# Patient Record
Sex: FEMALE | Race: WHITE | NOT HISPANIC OR LATINO | Employment: OTHER | URBAN - METROPOLITAN AREA
[De-identification: names, ages, dates, MRNs, and addresses within clinical notes are randomized per-mention and may not be internally consistent; named-entity substitution may affect disease eponyms.]

---

## 2023-02-25 PROBLEM — E66.01 MORBID OBESITY WITH BMI OF 40.0-44.9, ADULT (MULTI): Status: ACTIVE | Noted: 2023-02-25

## 2023-02-25 PROBLEM — E86.0 DEHYDRATION: Status: ACTIVE | Noted: 2023-02-25

## 2023-02-25 PROBLEM — H72.91 PERFORATION OF RIGHT TYMPANIC MEMBRANE: Status: ACTIVE | Noted: 2023-02-25

## 2023-02-25 PROBLEM — L30.9 ECZEMA: Status: ACTIVE | Noted: 2023-02-25

## 2023-02-25 PROBLEM — E78.2 MIXED HYPERLIPIDEMIA: Status: ACTIVE | Noted: 2023-02-25

## 2023-02-25 PROBLEM — R55 PRE-SYNCOPE: Status: ACTIVE | Noted: 2023-02-25

## 2023-02-25 PROBLEM — F41.8 DEPRESSION WITH ANXIETY: Status: ACTIVE | Noted: 2023-02-25

## 2023-02-25 PROBLEM — R42 POSTURAL DIZZINESS WITH PRESYNCOPE: Status: ACTIVE | Noted: 2023-02-25

## 2023-02-25 PROBLEM — J30.2 SEASONAL ALLERGIC RHINITIS: Status: ACTIVE | Noted: 2023-02-25

## 2023-02-25 PROBLEM — R09.82 POST-NASAL DRIP: Status: ACTIVE | Noted: 2023-02-25

## 2023-02-25 PROBLEM — R53.83 FATIGUE: Status: ACTIVE | Noted: 2023-02-25

## 2023-02-25 PROBLEM — R20.2 PARESTHESIAS: Status: ACTIVE | Noted: 2023-02-25

## 2023-02-25 PROBLEM — G47.33 OBSTRUCTIVE SLEEP APNEA SYNDROME IN ADULT: Status: ACTIVE | Noted: 2023-02-25

## 2023-02-25 PROBLEM — J01.01 RECURRENT MAXILLARY SINUSITIS: Status: ACTIVE | Noted: 2023-02-25

## 2023-02-25 PROBLEM — M54.2 NECK PAIN: Status: ACTIVE | Noted: 2023-02-25

## 2023-02-25 PROBLEM — E55.9 VITAMIN D DEFICIENCY: Status: ACTIVE | Noted: 2023-02-25

## 2023-02-25 PROBLEM — R51.9 UNILATERAL HEADACHE: Status: ACTIVE | Noted: 2023-02-25

## 2023-02-25 PROBLEM — R14.0 FLATULENCE/GAS PAIN/BELCHING: Status: ACTIVE | Noted: 2023-02-25

## 2023-02-25 PROBLEM — F33.1 MODERATE EPISODE OF RECURRENT MAJOR DEPRESSIVE DISORDER (MULTI): Status: ACTIVE | Noted: 2023-02-25

## 2023-02-25 PROBLEM — M48.02 DEGENERATIVE CERVICAL SPINAL STENOSIS: Status: ACTIVE | Noted: 2023-02-25

## 2023-02-25 PROBLEM — R73.03 PREDIABETES: Status: ACTIVE | Noted: 2023-02-25

## 2023-02-25 PROBLEM — K21.9 GERD (GASTROESOPHAGEAL REFLUX DISEASE): Status: ACTIVE | Noted: 2023-02-25

## 2023-02-25 PROBLEM — R23.3 PETECHIAL RASH: Status: ACTIVE | Noted: 2023-02-25

## 2023-02-25 PROBLEM — B37.31 YEAST VAGINITIS: Status: ACTIVE | Noted: 2023-02-25

## 2023-02-25 PROBLEM — K59.09 CHRONIC CONSTIPATION: Status: ACTIVE | Noted: 2023-02-25

## 2023-02-25 PROBLEM — M23.305 DERANGEMENT OF MEDIAL MENISCUS: Status: ACTIVE | Noted: 2023-02-25

## 2023-02-25 PROBLEM — R05.8 PAROXYSMAL COUGH: Status: ACTIVE | Noted: 2023-02-25

## 2023-02-25 PROBLEM — I10 ESSENTIAL HYPERTENSION: Status: ACTIVE | Noted: 2023-02-25

## 2023-02-25 PROBLEM — R19.8 ALTERNATING CONSTIPATION AND DIARRHEA: Status: ACTIVE | Noted: 2023-02-25

## 2023-02-25 PROBLEM — R53.81 DEBILITY: Status: ACTIVE | Noted: 2023-02-25

## 2023-02-25 PROBLEM — H10.13 ALLERGIC CONJUNCTIVITIS OF BOTH EYES: Status: ACTIVE | Noted: 2023-02-25

## 2023-02-25 PROBLEM — R79.89 AZOTEMIA: Status: ACTIVE | Noted: 2023-02-25

## 2023-02-25 PROBLEM — M25.50 POLYARTHRALGIA: Status: ACTIVE | Noted: 2023-02-25

## 2023-02-25 PROBLEM — H92.03 OTALGIA OF BOTH EARS: Status: ACTIVE | Noted: 2023-02-25

## 2023-02-25 PROBLEM — E79.0 HYPERURICEMIA: Status: ACTIVE | Noted: 2023-02-25

## 2023-02-25 PROBLEM — R55 POSTURAL DIZZINESS WITH PRESYNCOPE: Status: ACTIVE | Noted: 2023-02-25

## 2023-02-25 PROBLEM — M17.12 LEFT KNEE DJD: Status: ACTIVE | Noted: 2023-02-25

## 2023-02-25 RX ORDER — LISINOPRIL 10 MG/1
5 TABLET ORAL DAILY
COMMUNITY
Start: 2021-12-21 | End: 2023-07-31 | Stop reason: SDUPTHER

## 2023-02-25 RX ORDER — MINERAL OIL
ENEMA (ML) RECTAL
COMMUNITY
Start: 2022-08-17

## 2023-02-25 RX ORDER — VENLAFAXINE HYDROCHLORIDE 150 MG/1
150 CAPSULE, EXTENDED RELEASE ORAL
COMMUNITY
Start: 2021-12-28 | End: 2023-03-30 | Stop reason: SDUPTHER

## 2023-02-25 RX ORDER — OLOPATADINE HYDROCHLORIDE 1 MG/ML
SOLUTION/ DROPS OPHTHALMIC
COMMUNITY
Start: 2021-06-04

## 2023-02-25 RX ORDER — ONDANSETRON 4 MG/1
4 TABLET, ORALLY DISINTEGRATING ORAL EVERY 6 HOURS PRN
COMMUNITY
Start: 2021-07-07 | End: 2023-09-06 | Stop reason: SDUPTHER

## 2023-02-25 RX ORDER — ALBUTEROL SULFATE 90 UG/1
2 AEROSOL, METERED RESPIRATORY (INHALATION) EVERY 4 HOURS PRN
COMMUNITY
Start: 2022-11-15 | End: 2024-01-19 | Stop reason: SDUPTHER

## 2023-02-25 RX ORDER — FLUTICASONE PROPIONATE 50 MCG
1 SPRAY, SUSPENSION (ML) NASAL 2 TIMES DAILY
COMMUNITY
Start: 2021-06-04 | End: 2023-07-31 | Stop reason: SDUPTHER

## 2023-02-25 RX ORDER — GABAPENTIN 100 MG/1
CAPSULE ORAL
COMMUNITY
End: 2023-07-31 | Stop reason: SDUPTHER

## 2023-02-25 RX ORDER — DILTIAZEM HYDROCHLORIDE 120 MG/1
1 CAPSULE, EXTENDED RELEASE ORAL DAILY
COMMUNITY
Start: 2021-12-21 | End: 2023-03-30 | Stop reason: SDUPTHER

## 2023-03-30 ENCOUNTER — OFFICE VISIT (OUTPATIENT)
Dept: PRIMARY CARE | Facility: CLINIC | Age: 73
End: 2023-03-30
Payer: COMMERCIAL

## 2023-03-30 VITALS
WEIGHT: 227 LBS | HEIGHT: 62 IN | SYSTOLIC BLOOD PRESSURE: 120 MMHG | RESPIRATION RATE: 18 BRPM | DIASTOLIC BLOOD PRESSURE: 59 MMHG | BODY MASS INDEX: 41.77 KG/M2 | HEART RATE: 72 BPM | OXYGEN SATURATION: 97 %

## 2023-03-30 DIAGNOSIS — E66.01 MORBID OBESITY WITH BMI OF 40.0-44.9, ADULT (MULTI): ICD-10-CM

## 2023-03-30 DIAGNOSIS — F41.8 DEPRESSION WITH ANXIETY: ICD-10-CM

## 2023-03-30 DIAGNOSIS — N39.41 URGE INCONTINENCE: ICD-10-CM

## 2023-03-30 DIAGNOSIS — R73.03 PREDIABETES: ICD-10-CM

## 2023-03-30 DIAGNOSIS — E78.2 MIXED HYPERLIPIDEMIA: ICD-10-CM

## 2023-03-30 DIAGNOSIS — Z78.9 STATIN INTOLERANCE: ICD-10-CM

## 2023-03-30 DIAGNOSIS — Z00.00 WELCOME TO MEDICARE PREVENTIVE VISIT: ICD-10-CM

## 2023-03-30 DIAGNOSIS — Z00.00 ROUTINE GENERAL MEDICAL EXAMINATION AT HEALTH CARE FACILITY: Primary | ICD-10-CM

## 2023-03-30 DIAGNOSIS — G47.33 OBSTRUCTIVE SLEEP APNEA SYNDROME IN ADULT: ICD-10-CM

## 2023-03-30 DIAGNOSIS — I10 ESSENTIAL HYPERTENSION: ICD-10-CM

## 2023-03-30 DIAGNOSIS — R19.8 ALTERNATING CONSTIPATION AND DIARRHEA: ICD-10-CM

## 2023-03-30 DIAGNOSIS — Z12.31 SCREENING MAMMOGRAM, ENCOUNTER FOR: ICD-10-CM

## 2023-03-30 PROBLEM — E86.0 DEHYDRATION: Status: RESOLVED | Noted: 2023-02-25 | Resolved: 2023-03-30

## 2023-03-30 PROCEDURE — 3074F SYST BP LT 130 MM HG: CPT | Performed by: INTERNAL MEDICINE

## 2023-03-30 PROCEDURE — 3078F DIAST BP <80 MM HG: CPT | Performed by: INTERNAL MEDICINE

## 2023-03-30 PROCEDURE — 1159F MED LIST DOCD IN RCRD: CPT | Performed by: INTERNAL MEDICINE

## 2023-03-30 PROCEDURE — 99214 OFFICE O/P EST MOD 30 MIN: CPT | Performed by: INTERNAL MEDICINE

## 2023-03-30 PROCEDURE — 1036F TOBACCO NON-USER: CPT | Performed by: INTERNAL MEDICINE

## 2023-03-30 PROCEDURE — 1170F FXNL STATUS ASSESSED: CPT | Performed by: INTERNAL MEDICINE

## 2023-03-30 PROCEDURE — 1123F ACP DISCUSS/DSCN MKR DOCD: CPT | Performed by: INTERNAL MEDICINE

## 2023-03-30 PROCEDURE — 3008F BODY MASS INDEX DOCD: CPT | Performed by: INTERNAL MEDICINE

## 2023-03-30 PROCEDURE — 1160F RVW MEDS BY RX/DR IN RCRD: CPT | Performed by: INTERNAL MEDICINE

## 2023-03-30 RX ORDER — LANOLIN ALCOHOL/MO/W.PET/CERES
1000 CREAM (GRAM) TOPICAL
COMMUNITY
Start: 2019-01-17

## 2023-03-30 RX ORDER — VENLAFAXINE HYDROCHLORIDE 150 MG/1
150 CAPSULE, EXTENDED RELEASE ORAL DAILY
Qty: 90 CAPSULE | Refills: 0 | Status: SHIPPED | OUTPATIENT
Start: 2023-03-30 | End: 2023-06-28

## 2023-03-30 RX ORDER — DILTIAZEM HYDROCHLORIDE 120 MG/1
120 CAPSULE, EXTENDED RELEASE ORAL DAILY
Qty: 90 CAPSULE | Refills: 1 | Status: SHIPPED | OUTPATIENT
Start: 2023-03-30 | End: 2023-07-31 | Stop reason: SDUPTHER

## 2023-03-30 ASSESSMENT — ENCOUNTER SYMPTOMS
OCCASIONAL FEELINGS OF UNSTEADINESS: 0
DEPRESSION: 0
LOSS OF SENSATION IN FEET: 0

## 2023-03-30 ASSESSMENT — ACTIVITIES OF DAILY LIVING (ADL)
MANAGING_FINANCES: INDEPENDENT
BATHING: INDEPENDENT
GROCERY_SHOPPING: INDEPENDENT
TAKING_MEDICATION: INDEPENDENT
DOING_HOUSEWORK: INDEPENDENT
DRESSING: INDEPENDENT

## 2023-03-30 ASSESSMENT — PATIENT HEALTH QUESTIONNAIRE - PHQ9
1. LITTLE INTEREST OR PLEASURE IN DOING THINGS: NOT AT ALL
2. FEELING DOWN, DEPRESSED OR HOPELESS: NOT AT ALL
SUM OF ALL RESPONSES TO PHQ9 QUESTIONS 1 AND 2: 0

## 2023-03-30 NOTE — PATIENT INSTRUCTIONS
Thank you very much for coming.  I am very happy to see you.    Congratulations on your skilled nursing.  It is probably the best thing that you did in a long time!  In the meantime, we did your Medicare Welcome visit, and there are things that you can do first, and if you would like, you can take the next 4 months to work on yourself.    I do understand that your psychiatrist cannot see you anymore.  Let us get you a new psychiatrist.  In the meantime, you are doing so well, that I will give you your VENLAFAXINE for 3 months, then I want you to call for a refill, so that you can tell me how you are doing before I give you another 3 months.    I will see you in 4 months so that we can see how you are doing with diet and exercise and taking care of yourself.  FASTING laboratory examinations, then see me for your postponed COMPLETE physical examination.    Until then, you are already doing very well.  The stress of work it is off your shoulders.  With your diarrhea, we will not do regular metformin.  We will postpone that we will defer that medication for the future if ever.    You already realized that you have all this time to take care of yourself, and improve motivation.  Go ahead and join [employer].  I am glad you are even taking a painting.  Maximize diet.  Maximize exercise.  [place] diet book, DASH diet for ideas.    Make sure you are getting enough rest and sleep.    Have your MAMMOGRAM done sometime soon.    Please get in touch with GI and reschedule your COLONOSCOPY, very important.    Please urinate often, and the rule of thumb is to urinate before you have to urinate!  That way, you will not have to get into accidents.    Again, come back in 4 months after maximizing diet, exercise, and taking care of yourself.  Call me sooner with any questions or concerns.  Call me in 3 months for refill of VENLAFAXINE, and that you are seeing a new psychiatrist.    FASTING laboratory examinations in 4 months, then  see me for your COMPLETE physical examination.  Until then, please continue to take care of yourself and your family, and please continue to pray for our recovery from this pandemic.  I hope you have a blessed Lent and a happy Easter!            0  Return in 4 months.  40 minutes please.  Repeat FASTING laboratory examination, then see me for COMPLETE physical examination.  Reassess mood, energy, function.  Coordinate hopefully with psychiatry.  Consider offering statin once more.  Reassess possible CPAP use.  Reassess options regarding weight management.  Coordinate with GI, colonoscopy results.  Review preventive strategies, weight management, cardiovascular risk.            0

## 2023-03-30 NOTE — PROGRESS NOTES
Subjective   Patient ID: Cindy Calvillo is a 72 y.o. female who presents for Follow-up (Patient is here for follow up.).    HPI     Review of Systems    Objective   There were no vitals taken for this visit.    Physical Exam    Assessment/Plan

## 2023-03-30 NOTE — PROGRESS NOTES
"Subjective   Reason for Visit: Cindy Calvillo is an 72 y.o. female here for a Medicare Wellness visit.          Reviewed all medications by prescribing practitioner or clinical pharmacist (such as prescriptions, OTCs, herbal therapies and supplements) and documented in the medical record.    HPI    Patient Care Team:  Chilo Graves MD as PCP - General  Chilo Graves MD as PCP - [facility] [employer] PCP     Review of Systems    Objective   Vitals:  /59 (BP Location: Left arm, Patient Position: Sitting, BP Cuff Size: Adult)   Pulse 72   Resp 18   Ht 1.575 m (5' 2\")   Wt 103 kg (227 lb)   SpO2 97%   BMI 41.52 kg/m²       Physical Exam    Assessment/Plan   Problem List Items Addressed This Visit    None  Visit Diagnoses     Routine general medical examination at health care facility    -  Primary               "

## 2023-04-19 ENCOUNTER — TELEPHONE (OUTPATIENT)
Dept: PRIMARY CARE | Facility: CLINIC | Age: 73
End: 2023-04-19
Payer: COMMERCIAL

## 2023-06-28 DIAGNOSIS — F41.8 DEPRESSION WITH ANXIETY: ICD-10-CM

## 2023-06-28 RX ORDER — VENLAFAXINE HYDROCHLORIDE 150 MG/1
150 CAPSULE, EXTENDED RELEASE ORAL DAILY
Qty: 90 CAPSULE | Refills: 0 | Status: SHIPPED | OUTPATIENT
Start: 2023-06-28 | End: 2023-11-16 | Stop reason: SDUPTHER

## 2023-07-26 ENCOUNTER — LAB (OUTPATIENT)
Dept: LAB | Facility: LAB | Age: 73
End: 2023-07-26
Payer: COMMERCIAL

## 2023-07-26 DIAGNOSIS — E78.2 MIXED HYPERLIPIDEMIA: ICD-10-CM

## 2023-07-26 DIAGNOSIS — R19.8 ALTERNATING CONSTIPATION AND DIARRHEA: ICD-10-CM

## 2023-07-26 DIAGNOSIS — R73.03 PREDIABETES: ICD-10-CM

## 2023-07-26 DIAGNOSIS — I10 ESSENTIAL HYPERTENSION: ICD-10-CM

## 2023-07-26 DIAGNOSIS — G47.33 OBSTRUCTIVE SLEEP APNEA SYNDROME IN ADULT: ICD-10-CM

## 2023-07-26 DIAGNOSIS — F41.8 DEPRESSION WITH ANXIETY: ICD-10-CM

## 2023-07-26 LAB
ALANINE AMINOTRANSFERASE (SGPT) (U/L) IN SER/PLAS: 20 U/L (ref 7–45)
ALBUMIN (G/DL) IN SER/PLAS: 4 G/DL (ref 3.4–5)
ALKALINE PHOSPHATASE (U/L) IN SER/PLAS: 59 U/L (ref 33–136)
ANION GAP IN SER/PLAS: 16 MMOL/L (ref 10–20)
ASPARTATE AMINOTRANSFERASE (SGOT) (U/L) IN SER/PLAS: 16 U/L (ref 9–39)
BASOPHILS (10*3/UL) IN BLOOD BY AUTOMATED COUNT: 0.06 X10E9/L (ref 0–0.1)
BASOPHILS/100 LEUKOCYTES IN BLOOD BY AUTOMATED COUNT: 0.8 % (ref 0–2)
BILIRUBIN TOTAL (MG/DL) IN SER/PLAS: 1.1 MG/DL (ref 0–1.2)
CALCIUM (MG/DL) IN SER/PLAS: 9.5 MG/DL (ref 8.6–10.3)
CARBON DIOXIDE, TOTAL (MMOL/L) IN SER/PLAS: 26 MMOL/L (ref 21–32)
CHLORIDE (MMOL/L) IN SER/PLAS: 102 MMOL/L (ref 98–107)
CHOLESTEROL (MG/DL) IN SER/PLAS: 248 MG/DL (ref 0–199)
CHOLESTEROL IN HDL (MG/DL) IN SER/PLAS: 37.8 MG/DL
CHOLESTEROL/HDL RATIO: 6.6
CREATININE (MG/DL) IN SER/PLAS: 0.79 MG/DL (ref 0.5–1.05)
EOSINOPHILS (10*3/UL) IN BLOOD BY AUTOMATED COUNT: 0.13 X10E9/L (ref 0–0.4)
EOSINOPHILS/100 LEUKOCYTES IN BLOOD BY AUTOMATED COUNT: 1.8 % (ref 0–6)
ERYTHROCYTE DISTRIBUTION WIDTH (RATIO) BY AUTOMATED COUNT: 13.2 % (ref 11.5–14.5)
ERYTHROCYTE MEAN CORPUSCULAR HEMOGLOBIN CONCENTRATION (G/DL) BY AUTOMATED: 32.6 G/DL (ref 32–36)
ERYTHROCYTE MEAN CORPUSCULAR VOLUME (FL) BY AUTOMATED COUNT: 92 FL (ref 80–100)
ERYTHROCYTES (10*6/UL) IN BLOOD BY AUTOMATED COUNT: 4.67 X10E12/L (ref 4–5.2)
ESTIMATED AVERAGE GLUCOSE FOR HBA1C: 128 MG/DL
GFR FEMALE: 80 ML/MIN/1.73M2
GLUCOSE (MG/DL) IN SER/PLAS: 160 MG/DL (ref 74–99)
HEMATOCRIT (%) IN BLOOD BY AUTOMATED COUNT: 43 % (ref 36–46)
HEMOGLOBIN (G/DL) IN BLOOD: 14 G/DL (ref 12–16)
HEMOGLOBIN A1C/HEMOGLOBIN TOTAL IN BLOOD: 6.1 %
IMMATURE GRANULOCYTES/100 LEUKOCYTES IN BLOOD BY AUTOMATED COUNT: 0.3 % (ref 0–0.9)
LDL: 172 MG/DL (ref 0–99)
LEUKOCYTES (10*3/UL) IN BLOOD BY AUTOMATED COUNT: 7.1 X10E9/L (ref 4.4–11.3)
LYMPHOCYTES (10*3/UL) IN BLOOD BY AUTOMATED COUNT: 1.83 X10E9/L (ref 0.8–3)
LYMPHOCYTES/100 LEUKOCYTES IN BLOOD BY AUTOMATED COUNT: 25.7 % (ref 13–44)
MAGNESIUM (MG/DL) IN SER/PLAS: 1.99 MG/DL (ref 1.6–2.4)
MONOCYTES (10*3/UL) IN BLOOD BY AUTOMATED COUNT: 0.28 X10E9/L (ref 0.05–0.8)
MONOCYTES/100 LEUKOCYTES IN BLOOD BY AUTOMATED COUNT: 3.9 % (ref 2–10)
NEUTROPHILS (10*3/UL) IN BLOOD BY AUTOMATED COUNT: 4.79 X10E9/L (ref 1.6–5.5)
NEUTROPHILS/100 LEUKOCYTES IN BLOOD BY AUTOMATED COUNT: 67.5 % (ref 40–80)
PLATELETS (10*3/UL) IN BLOOD AUTOMATED COUNT: 226 X10E9/L (ref 150–450)
POTASSIUM (MMOL/L) IN SER/PLAS: 4.3 MMOL/L (ref 3.5–5.3)
PROTEIN TOTAL: 7.1 G/DL (ref 6.4–8.2)
SODIUM (MMOL/L) IN SER/PLAS: 140 MMOL/L (ref 136–145)
THYROTROPIN (MIU/L) IN SER/PLAS BY DETECTION LIMIT <= 0.05 MIU/L: 2.57 MIU/L (ref 0.44–3.98)
TRIGLYCERIDE (MG/DL) IN SER/PLAS: 193 MG/DL (ref 0–149)
UREA NITROGEN (MG/DL) IN SER/PLAS: 14 MG/DL (ref 6–23)
VLDL: 39 MG/DL (ref 0–40)

## 2023-07-26 PROCEDURE — 80061 LIPID PANEL: CPT

## 2023-07-26 PROCEDURE — 36415 COLL VENOUS BLD VENIPUNCTURE: CPT

## 2023-07-26 PROCEDURE — 83036 HEMOGLOBIN GLYCOSYLATED A1C: CPT

## 2023-07-26 PROCEDURE — 83735 ASSAY OF MAGNESIUM: CPT

## 2023-07-26 PROCEDURE — 85025 COMPLETE CBC W/AUTO DIFF WBC: CPT

## 2023-07-26 PROCEDURE — 80053 COMPREHEN METABOLIC PANEL: CPT

## 2023-07-26 PROCEDURE — 84443 ASSAY THYROID STIM HORMONE: CPT

## 2023-07-31 ENCOUNTER — TELEPHONE (OUTPATIENT)
Dept: PRIMARY CARE | Facility: CLINIC | Age: 73
End: 2023-07-31

## 2023-07-31 ENCOUNTER — OFFICE VISIT (OUTPATIENT)
Dept: PRIMARY CARE | Facility: CLINIC | Age: 73
End: 2023-07-31
Payer: COMMERCIAL

## 2023-07-31 VITALS
HEART RATE: 76 BPM | HEIGHT: 62 IN | BODY MASS INDEX: 41.59 KG/M2 | RESPIRATION RATE: 20 BRPM | DIASTOLIC BLOOD PRESSURE: 74 MMHG | SYSTOLIC BLOOD PRESSURE: 117 MMHG | WEIGHT: 226 LBS | OXYGEN SATURATION: 98 %

## 2023-07-31 DIAGNOSIS — I10 ESSENTIAL HYPERTENSION: Primary | ICD-10-CM

## 2023-07-31 DIAGNOSIS — E66.01 MORBID OBESITY WITH BMI OF 40.0-44.9, ADULT (MULTI): ICD-10-CM

## 2023-07-31 DIAGNOSIS — Z78.9 STATIN INTOLERANCE: ICD-10-CM

## 2023-07-31 DIAGNOSIS — R73.03 PREDIABETES: ICD-10-CM

## 2023-07-31 DIAGNOSIS — E55.9 VITAMIN D DEFICIENCY: ICD-10-CM

## 2023-07-31 DIAGNOSIS — E78.2 MIXED HYPERLIPIDEMIA: ICD-10-CM

## 2023-07-31 DIAGNOSIS — G47.33 OBSTRUCTIVE SLEEP APNEA SYNDROME IN ADULT: ICD-10-CM

## 2023-07-31 DIAGNOSIS — F41.8 DEPRESSION WITH ANXIETY: ICD-10-CM

## 2023-07-31 DIAGNOSIS — N39.41 URGE INCONTINENCE: ICD-10-CM

## 2023-07-31 DIAGNOSIS — M48.02 DEGENERATIVE CERVICAL SPINAL STENOSIS: ICD-10-CM

## 2023-07-31 DIAGNOSIS — J30.1 SEASONAL ALLERGIC RHINITIS DUE TO POLLEN: ICD-10-CM

## 2023-07-31 DIAGNOSIS — Z11.59 ENCOUNTER FOR HEPATITIS C SCREENING TEST FOR LOW RISK PATIENT: ICD-10-CM

## 2023-07-31 DIAGNOSIS — R19.8 ALTERNATING CONSTIPATION AND DIARRHEA: ICD-10-CM

## 2023-07-31 DIAGNOSIS — M81.0 AGE-RELATED OSTEOPOROSIS WITHOUT CURRENT PATHOLOGICAL FRACTURE: ICD-10-CM

## 2023-07-31 DIAGNOSIS — Z13.820 SCREENING FOR OSTEOPOROSIS: ICD-10-CM

## 2023-07-31 DIAGNOSIS — Z12.31 SCREENING MAMMOGRAM, ENCOUNTER FOR: ICD-10-CM

## 2023-07-31 PROCEDURE — 1125F AMNT PAIN NOTED PAIN PRSNT: CPT | Performed by: INTERNAL MEDICINE

## 2023-07-31 PROCEDURE — 3074F SYST BP LT 130 MM HG: CPT | Performed by: INTERNAL MEDICINE

## 2023-07-31 PROCEDURE — 86803 HEPATITIS C AB TEST: CPT

## 2023-07-31 PROCEDURE — 1159F MED LIST DOCD IN RCRD: CPT | Performed by: INTERNAL MEDICINE

## 2023-07-31 PROCEDURE — 1036F TOBACCO NON-USER: CPT | Performed by: INTERNAL MEDICINE

## 2023-07-31 PROCEDURE — 99214 OFFICE O/P EST MOD 30 MIN: CPT | Performed by: INTERNAL MEDICINE

## 2023-07-31 PROCEDURE — 82150 ASSAY OF AMYLASE: CPT

## 2023-07-31 PROCEDURE — 3078F DIAST BP <80 MM HG: CPT | Performed by: INTERNAL MEDICINE

## 2023-07-31 PROCEDURE — 83690 ASSAY OF LIPASE: CPT

## 2023-07-31 PROCEDURE — 1160F RVW MEDS BY RX/DR IN RCRD: CPT | Performed by: INTERNAL MEDICINE

## 2023-07-31 PROCEDURE — 3008F BODY MASS INDEX DOCD: CPT | Performed by: INTERNAL MEDICINE

## 2023-07-31 RX ORDER — FLUTICASONE PROPIONATE 50 MCG
SPRAY, SUSPENSION (ML) NASAL
Qty: 16 G | Refills: 3 | Status: SHIPPED | OUTPATIENT
Start: 2023-07-31 | End: 2023-08-29

## 2023-07-31 RX ORDER — LISINOPRIL 5 MG/1
TABLET ORAL
Qty: 90 TABLET | Refills: 1 | Status: SHIPPED | OUTPATIENT
Start: 2023-07-31 | End: 2024-01-19 | Stop reason: SDUPTHER

## 2023-07-31 RX ORDER — GABAPENTIN 100 MG/1
CAPSULE ORAL
Qty: 360 CAPSULE | Refills: 0 | Status: SHIPPED | OUTPATIENT
Start: 2023-07-31 | End: 2023-12-12

## 2023-07-31 RX ORDER — DILTIAZEM HYDROCHLORIDE 120 MG/1
120 CAPSULE, EXTENDED RELEASE ORAL DAILY
Qty: 90 CAPSULE | Refills: 3 | Status: SHIPPED | OUTPATIENT
Start: 2023-07-31 | End: 2024-01-19 | Stop reason: SDUPTHER

## 2023-07-31 NOTE — PROGRESS NOTES
"Patient is here for CPE. Patient is cleared by CARDIOLOGY, EKG in January 2023    Subjective   Patient ID: Cindy Calvillo is a 73 y.o. female who presents for Annual Exam.    HPI   The patient is here for COMPLETE physical examination for the year.  She has no particular complaints, except for chronic issues, URGENCY of micturition, recurrent DIARRHEA.  No new medications.  The patient has been more compliant with diet, and has been trying to avoid CARBOHYDRATES.  Likewise, she has been more physically active.  She has had more time to take care of herself, and has been doing a lot of walking.  This is sometimes interrupted by urinary urgency, and she was thinking of getting a bicycle instead of walking, so that if needed, she can ride to the restroom faster.    Compliant with medications, tolerating regimens.  Told by her insurance company that she would benefit from getting a blood pressure cuff, and that she was \"hypertensive.\"  In the meantime, seen by cardiology January, cleared for possible surgery.  Has not had any particular symptoms referable to hemodynamic instability since then.  No lee chest pain.  No orthopnea, no paroxysmal nocturnal dyspnea.  No dizziness, diaphoresis, palpitations.  No loss of consciousness.  No bipedal edema.  No remarkable dyspnea on exertion.  Has not been able to tolerate STATINS in the past, with some myalgia, but otherwise, no focal weakness.  No skin changes, rashes, pruritus, jaundice.  No easy bruisability.    Has been in good spirits.  Has been enjoying FDC.  Continues to want to stay healthy, independent, and productive, and does not wish harm to self or others.  No headache, blurred vision, diplopia.  No dysphagia.  No focal weakness, no falls.  History of ataxia, has been better.  Not worried about memory.    ENDOCRINE with no polyuria, polydipsia, polyphagia.  No blurred vision.  No skin, hair, nail changes.  No dramatic weight loss or weight gain.  Careful " "about exposure.  No particular coughing, no particular sputum production.  CONSTITUTIONALLY, no fever, no chills.  No night sweats.  No lingering anorexia or nausea.  No apparent lymphadenopathy.  No apparent weight loss.        Review of Systems  Review of systems as in history of present illness, and otherwise, reviewed separately as well, and was unremarkable/negative/noncontributory.          Objective   /74 (BP Location: Left arm, Patient Position: Sitting, BP Cuff Size: Adult)   Pulse 76   Resp 20   Ht 1.575 m (5' 2\")   Wt 103 kg (226 lb)   SpO2 98%   BMI 41.34 kg/m²     Physical Exam  In very good spirits.  Not in distress or diaphoresis.  Alert, oriented x3.  Amiable.  Not unkempt.  Receptive.  Cheerful.  Appropriate.  Eager to stay independent, and improve quality of life.  Does not wish harm to self or others.  Obese, but capable and independent.    HEAD Pink palpebral conjunctivae, anicteric sclerae.  No sinus tenderness.  No tragal tenderness.  Tympanic membranes intact bilaterally.  Mucous membranes moist.  No tonsillopharyngeal congestion.  No thrush.  NECK supple, with no jugular venous distention, no lymph nodes.  No masses.  No bruits.  CARDIOVASCULAR adynamic precordium, with no heaves, thrills, or murmurs.  Regular rate and rhythm.  No bilateral edema.  LUNGS not in distress or diaphoresis.  Not using accessory muscles.  Clear to auscultation bilaterally.  BREASTS with no masses, discharge, and no lymphadenopathy noted, including axillary tail.  ABDOMEN positive bowel sounds, soft, nontender.  Liver and spleen not palpable.  Traube's space not obliterated.  No masses appreciated.  No herniation appreciated.  BACK no costovertebral angle tenderness.  EXTREMITIES no clubbing, no cyanosis.  SKIN with no breakdown, bleeding, jaundice.  NEURO no cranial nerve deficits noted.  No facial asymmetry.  Romberg negative.  No tremors.  Babinski negative.  No clonus.  PSYCH receptive, appropriate.  " Eager to stay healthy and independent and productive.  Continues to want to maintain and improve quality of life.      LABORATORY examinations reviewed with patient.        Assessment/Plan   Problem List Items Addressed This Visit       Alternating constipation and diarrhea    Relevant Orders    Amylase    Lipase    Urinalysis with Reflex Microscopic and Culture    Degenerative cervical spinal stenosis    Relevant Medications    gabapentin (Neurontin) 100 mg capsule    Depression with anxiety    Essential hypertension - Primary    Relevant Medications    dilTIAZem ER (Tiazac) 120 mg 24 hr capsule    lisinopril 5 mg tablet    Mixed hyperlipidemia    Morbid obesity with BMI of 40.0-44.9, adult (CMS/[facility])    Relevant Orders    XR DEXA bone density    Obstructive sleep apnea syndrome in adult    Prediabetes    Seasonal allergic rhinitis    Relevant Medications    fluticasone (Flonase) 50 mcg/actuation nasal spray    Statin intolerance    Urge incontinence    Relevant Orders    Urinalysis with Reflex Microscopic and Culture    XR DEXA bone density    Vitamin D deficiency    Relevant Orders    XR DEXA bone density     Other Visit Diagnoses       Screening mammogram, encounter for        Relevant Orders    BI mammo bilateral screening tomosynthesis    Screening for osteoporosis        Relevant Orders    XR DEXA bone density    Encounter for hepatitis C screening test for low risk patient        Relevant Orders    Hepatitis C antibody    Age-related osteoporosis without current pathological fracture        Relevant Orders    XR DEXA bone density             Thank you very much for coming.  I am very happy to see you.    Let us do some additional BLOOD and URINE examinations today.  I will send word regarding results and possible changes.    Please get your BONE DENSITY and MAMMOGRAM done sometime soon.    Please call GI back to reschedule for your COLONOSCOPY.  Very important.    You will benefit from a relatively new  PNEUMONIA vaccination, PREVNAR 20.  You have not had this 1.    I will have [facility] called up so that we can update your SHINGLES vaccination records.    Let me call you with results and possible changes.    I do understand that you have been maximizing DIET and EXERCISE.  [place] diet book, DASH diet for ideas.  Consider seeing a NUTRITIONIST.  Continue staying physically active.  Getting a bicycle is a good idea.    Depending on how things go, you will benefit from a trial of STATIN again anytime soon.  Let us first take care of other things, above.    Also, we can discuss options regarding weight management, especially with the new medications out there.  If your insurance will allow, injectable medications might be a good idea for you.    Please come back in 2 months, so that we can review all of your overall risks, and we will discuss options regarding weight management, and discuss options regarding controlling your cholesterol and blood sugar aside from diet and exercise on your own.  It will all depend on how you feel when you come back.    Until then, please continue to take care of yourself and your family, and please continue to pray for our recovery from this pandemic.  See you in 2 months.            0  Return in 2 months.  20 minutes please.  Reassess overall health, preventive strategies.  Consider treatment for prediabetes, morbid obesity.  Consider challenging again with statin.  Review overall cardiovascular risk.  Prepare for winter.            0

## 2023-07-31 NOTE — PATIENT INSTRUCTIONS
Thank you very much for coming.  I am very happy to see you.    Let us do some additional BLOOD and URINE examinations today.  I will send word regarding results and possible changes.    Please get your BONE DENSITY and MAMMOGRAM done sometime soon.    Please call GI back to reschedule for your COLONOSCOPY.  Very important.    You will benefit from a relatively new PNEUMONIA vaccination, PREVNAR 20.  You have not had this 1.    I will have [facility] called up so that we can update your SHINGLES vaccination records.    Let me call you with results and possible changes.    I do understand that you have been maximizing DIET and EXERCISE.  [place] diet book, DASH diet for ideas.  Consider seeing a NUTRITIONIST.  Continue staying physically active.  Getting a bicycle is a good idea.    Depending on how things go, you will benefit from a trial of STATIN again anytime soon.  Let us first take care of other things, above.    Also, we can discuss options regarding weight management, especially with the new medications out there.  If your insurance will allow, injectable medications might be a good idea for you.    Please come back in 2 months, so that we can review all of your overall risks, and we will discuss options regarding weight management, and discuss options regarding controlling your cholesterol and blood sugar aside from diet and exercise on your own.  It will all depend on how you feel when you come back.    Until then, please continue to take care of yourself and your family, and please continue to pray for our recovery from this pandemic.  See you in 2 months.            0  Return in 2 months.  20 minutes please.  Reassess overall health, preventive strategies.  Consider treatment for prediabetes, morbid obesity.  Consider challenging again with statin.  Review overall cardiovascular risk.  Prepare for winter.            0

## 2023-08-01 LAB
AMYLASE (U/L) IN SER/PLAS: 28 U/L (ref 29–103)
HEPATITIS C VIRUS AB PRESENCE IN SERUM: NONREACTIVE
LIPASE (U/L) IN SER/PLAS: 46 U/L (ref 9–82)

## 2023-08-29 DIAGNOSIS — J30.1 SEASONAL ALLERGIC RHINITIS DUE TO POLLEN: ICD-10-CM

## 2023-08-29 RX ORDER — FLUTICASONE PROPIONATE 50 MCG
SPRAY, SUSPENSION (ML) NASAL
Qty: 16 ML | Refills: 3 | Status: SHIPPED | OUTPATIENT
Start: 2023-08-29 | End: 2024-01-19 | Stop reason: SDUPTHER

## 2023-09-06 ENCOUNTER — OFFICE VISIT (OUTPATIENT)
Dept: PRIMARY CARE | Facility: CLINIC | Age: 73
End: 2023-09-06
Payer: COMMERCIAL

## 2023-09-06 VITALS
BODY MASS INDEX: 41.73 KG/M2 | OXYGEN SATURATION: 97 % | HEIGHT: 62 IN | DIASTOLIC BLOOD PRESSURE: 77 MMHG | SYSTOLIC BLOOD PRESSURE: 116 MMHG | HEART RATE: 82 BPM | RESPIRATION RATE: 18 BRPM | WEIGHT: 226.8 LBS

## 2023-09-06 DIAGNOSIS — R53.83 FATIGUE, UNSPECIFIED TYPE: Primary | ICD-10-CM

## 2023-09-06 DIAGNOSIS — I10 ESSENTIAL HYPERTENSION: ICD-10-CM

## 2023-09-06 DIAGNOSIS — E78.2 MIXED HYPERLIPIDEMIA: ICD-10-CM

## 2023-09-06 DIAGNOSIS — K21.9 GASTROESOPHAGEAL REFLUX DISEASE, UNSPECIFIED WHETHER ESOPHAGITIS PRESENT: ICD-10-CM

## 2023-09-06 DIAGNOSIS — R73.03 PREDIABETES: ICD-10-CM

## 2023-09-06 DIAGNOSIS — G47.33 OSA ON CPAP: ICD-10-CM

## 2023-09-06 DIAGNOSIS — F33.1 MODERATE EPISODE OF RECURRENT MAJOR DEPRESSIVE DISORDER (MULTI): ICD-10-CM

## 2023-09-06 DIAGNOSIS — E55.9 VITAMIN D DEFICIENCY: ICD-10-CM

## 2023-09-06 LAB
BASOPHILS (10*3/UL) IN BLOOD BY AUTOMATED COUNT: 0.06 X10E9/L (ref 0–0.1)
BASOPHILS/100 LEUKOCYTES IN BLOOD BY AUTOMATED COUNT: 0.8 % (ref 0–2)
EOSINOPHILS (10*3/UL) IN BLOOD BY AUTOMATED COUNT: 0.13 X10E9/L (ref 0–0.4)
EOSINOPHILS/100 LEUKOCYTES IN BLOOD BY AUTOMATED COUNT: 1.7 % (ref 0–6)
ERYTHROCYTE DISTRIBUTION WIDTH (RATIO) BY AUTOMATED COUNT: 13.1 % (ref 11.5–14.5)
ERYTHROCYTE MEAN CORPUSCULAR HEMOGLOBIN CONCENTRATION (G/DL) BY AUTOMATED: 32.4 G/DL (ref 32–36)
ERYTHROCYTE MEAN CORPUSCULAR VOLUME (FL) BY AUTOMATED COUNT: 93 FL (ref 80–100)
ERYTHROCYTES (10*6/UL) IN BLOOD BY AUTOMATED COUNT: 4.44 X10E12/L (ref 4–5.2)
HEMATOCRIT (%) IN BLOOD BY AUTOMATED COUNT: 41.3 % (ref 36–46)
HEMOGLOBIN (G/DL) IN BLOOD: 13.4 G/DL (ref 12–16)
IMMATURE GRANULOCYTES/100 LEUKOCYTES IN BLOOD BY AUTOMATED COUNT: 0.3 % (ref 0–0.9)
LEUKOCYTES (10*3/UL) IN BLOOD BY AUTOMATED COUNT: 7.8 X10E9/L (ref 4.4–11.3)
LYMPHOCYTES (10*3/UL) IN BLOOD BY AUTOMATED COUNT: 1.52 X10E9/L (ref 0.8–3)
LYMPHOCYTES/100 LEUKOCYTES IN BLOOD BY AUTOMATED COUNT: 19.6 % (ref 13–44)
MONOCYTES (10*3/UL) IN BLOOD BY AUTOMATED COUNT: 0.34 X10E9/L (ref 0.05–0.8)
MONOCYTES/100 LEUKOCYTES IN BLOOD BY AUTOMATED COUNT: 4.4 % (ref 2–10)
NEUTROPHILS (10*3/UL) IN BLOOD BY AUTOMATED COUNT: 5.68 X10E9/L (ref 1.6–5.5)
NEUTROPHILS/100 LEUKOCYTES IN BLOOD BY AUTOMATED COUNT: 73.2 % (ref 40–80)
NRBC (PER 100 WBCS) BY AUTOMATED COUNT: 0 /100 WBC (ref 0–0)
PLATELETS (10*3/UL) IN BLOOD AUTOMATED COUNT: 227 X10E9/L (ref 150–450)

## 2023-09-06 PROCEDURE — 80053 COMPREHEN METABOLIC PANEL: CPT

## 2023-09-06 PROCEDURE — 99213 OFFICE O/P EST LOW 20 MIN: CPT | Performed by: INTERNAL MEDICINE

## 2023-09-06 PROCEDURE — 83735 ASSAY OF MAGNESIUM: CPT

## 2023-09-06 PROCEDURE — 1125F AMNT PAIN NOTED PAIN PRSNT: CPT | Performed by: INTERNAL MEDICINE

## 2023-09-06 PROCEDURE — 3078F DIAST BP <80 MM HG: CPT | Performed by: INTERNAL MEDICINE

## 2023-09-06 PROCEDURE — 84443 ASSAY THYROID STIM HORMONE: CPT

## 2023-09-06 PROCEDURE — 1159F MED LIST DOCD IN RCRD: CPT | Performed by: INTERNAL MEDICINE

## 2023-09-06 PROCEDURE — 1036F TOBACCO NON-USER: CPT | Performed by: INTERNAL MEDICINE

## 2023-09-06 PROCEDURE — 3008F BODY MASS INDEX DOCD: CPT | Performed by: INTERNAL MEDICINE

## 2023-09-06 PROCEDURE — 85025 COMPLETE CBC W/AUTO DIFF WBC: CPT

## 2023-09-06 PROCEDURE — 83036 HEMOGLOBIN GLYCOSYLATED A1C: CPT

## 2023-09-06 PROCEDURE — 1160F RVW MEDS BY RX/DR IN RCRD: CPT | Performed by: INTERNAL MEDICINE

## 2023-09-06 PROCEDURE — 3074F SYST BP LT 130 MM HG: CPT | Performed by: INTERNAL MEDICINE

## 2023-09-06 RX ORDER — ONDANSETRON 4 MG/1
TABLET, ORALLY DISINTEGRATING ORAL
Qty: 45 TABLET | Refills: 0 | Status: SHIPPED | OUTPATIENT
Start: 2023-09-06 | End: 2023-10-02

## 2023-09-06 RX ORDER — FAMOTIDINE 20 MG/1
TABLET, FILM COATED ORAL
Qty: 180 TABLET | Refills: 1 | Status: SHIPPED | OUTPATIENT
Start: 2023-09-06 | End: 2023-10-19 | Stop reason: SDUPTHER

## 2023-09-06 NOTE — PATIENT INSTRUCTIONS
Thank very much for coming.  I am very happy to see you.    Please keep your appointment with your ALLERGIST, very important.  Please keep your appointment with your THERAPIST as well.  Please try and see if you can work with your CPAP machine.  Please keep your appointment with me in a couple of weeks.  I will call you sooner with results and possible changes.    This is unlikely to be related to B12, and instead, likely related to obstructive sleep apnea, but we will check all your markers today.    Please continue to take care of yourself and your family, and please continue to pray for our recovery from this pandemic.  See you in a few weeks.            0  The patient already has an appointment in a few weeks, but I will call her sooner with results and possible changes.            0

## 2023-09-06 NOTE — PROGRESS NOTES
"Patient is here for follow up.Subjective   Patient ID: Cindy Calvillo is a 73 y.o. female who presents for Follow-up.    HPI   The patient is here a little earlier than expected.  States that she is tired of upper respiratory symptoms, mainly rhinorrhea, sometimes causing her to have bloating, even some reflux symptoms.  She is due to see an ALLERGIST soon, but is wondering if she could do better than just fexofenadine and fluticasone.  Some occasional coughing, no particular sputum production.  No particular shortness of breath.  CONSTITUTIONALLY, no fever, no chills.  No night sweats.  No lingering anorexia or nausea.  No apparent lymphadenopathy.  No apparent weight loss.    Again, states she has had some exacerbation of REFLUX symptoms, including NAUSEA.  Has had good response to ONDANSETRON in the past.  No overlying skin changes.  No dysuria, flank, suprapubic pain.  No change in bowel or bladder character or habits.    In the meantime, frustrated over daytime sleepiness, and although she has a CPAP apparatus, she states that she is having a hard time tolerating her mask.  No confusion or delirium.  No headache, blurred vision, diplopia.  No dysphagia.  No lee substernal chest pain.  No orthopnea, no paroxysmal nocturnal dyspnea.        Review of Systems  Review of systems as in history of present illness, and otherwise, reviewed separately as well, and was unremarkable/negative/noncontributory.          Objective   /77 (BP Location: Left arm, Patient Position: Sitting, BP Cuff Size: Adult)   Pulse 82   Resp 18   Ht 1.575 m (5' 2\")   Wt 103 kg (226 lb 12.8 oz)   SpO2 97%   BMI 41.48 kg/m²     Physical Exam  Frustrated, but otherwise, not in distress or diaphoresis.  Receptive, oriented x3.  Amiable.  Not unkempt.  Obese build, but capable of caring for self.  Wants to improve quality of life.  Does not wish harm to self or others.    HEAD pink palpebral conjunctivae, anicteric sclerae.  NECK " supple, no apparent jugular venous distention.  CARDIOVASCULAR not in distress or diaphoresis.  No bipedal edema.  LUNGS not in distress or diaphoresis.  Not using accessory muscles.  ABDOMEN soft, nontender.  BACK no costovertebral angle tenderness.  EXTREMITIES no clubbing, no cyanosis.  NEURO no facial asymmetry.  No apparent cranial nerve deficits.  Ambulating without need of assistance.  No apparent focal weakness.  No tremors.  PSYCH receptive, appropriate, and eager to maintain and improve quality of life.      LABORATORY examinations reviewed with patient.  Due for update, to be done today.        Assessment/Plan   Problem List Items Addressed This Visit       Essential hypertension    Relevant Orders    Urinalysis with Reflex Microscopic and Culture    Magnesium    TSH with reflex to Free T4 if abnormal    Fatigue - Primary    Relevant Orders    CBC and Auto Differential    Urinalysis with Reflex Microscopic and Culture    Vitamin D 25-Hydroxy,Total (for eval of Vitamin D levels)    Comprehensive Metabolic Panel    Hemoglobin A1C    Magnesium    TSH with reflex to Free T4 if abnormal    Vitamin B12    Folate    Mixed hyperlipidemia    Relevant Orders    Comprehensive Metabolic Panel    Moderate episode of recurrent major depressive disorder (CMS/HCC)    Relevant Orders    TSH with reflex to Free T4 if abnormal    [place] on CPAP    Relevant Orders    CBC and Auto Differential    TSH with reflex to Free T4 if abnormal    Prediabetes    Relevant Orders    Urinalysis with Reflex Microscopic and Culture    Hemoglobin A1C    Vitamin D deficiency    Relevant Orders    Vitamin D 25-Hydroxy,Total (for eval of Vitamin D levels)        Thank very much for coming.  I am very happy to see you.    Please keep your appointment with your ALLERGIST, very important.  Please keep your appointment with your THERAPIST as well.  Please try and see if you can work with your CPAP machine.  Please keep your appointment with me in a  couple of weeks.  I will call you sooner with results and possible changes.    This is unlikely to be related to B12, and instead, likely related to obstructive sleep apnea, but we will check all your markers today.    Please continue to take care of yourself and your family, and please continue to pray for our recovery from this pandemic.  See you in a few weeks.            0  The patient already has an appointment in a few weeks, but I will call her sooner with results and possible changes.            0  The patient was prescribed FAMOTIDINE not only for reflux symptoms, but also for allergy symptoms for added antihistamine blockade.  Hopefully, this will afford the patient some decrease in symptoms until she finally sees her allergist.            0

## 2023-09-07 ENCOUNTER — TELEPHONE (OUTPATIENT)
Dept: PRIMARY CARE | Facility: CLINIC | Age: 73
End: 2023-09-07
Payer: COMMERCIAL

## 2023-09-07 LAB
ALANINE AMINOTRANSFERASE (SGPT) (U/L) IN SER/PLAS: 20 U/L (ref 7–45)
ALBUMIN (G/DL) IN SER/PLAS: 4.1 G/DL (ref 3.4–5)
ALKALINE PHOSPHATASE (U/L) IN SER/PLAS: 61 U/L (ref 33–136)
ANION GAP IN SER/PLAS: 15 MMOL/L (ref 10–20)
ASPARTATE AMINOTRANSFERASE (SGOT) (U/L) IN SER/PLAS: 16 U/L (ref 9–39)
BILIRUBIN TOTAL (MG/DL) IN SER/PLAS: 0.8 MG/DL (ref 0–1.2)
CALCIUM (MG/DL) IN SER/PLAS: 9.7 MG/DL (ref 8.6–10.6)
CARBON DIOXIDE, TOTAL (MMOL/L) IN SER/PLAS: 26 MMOL/L (ref 21–32)
CHLORIDE (MMOL/L) IN SER/PLAS: 103 MMOL/L (ref 98–107)
CREATININE (MG/DL) IN SER/PLAS: 0.66 MG/DL (ref 0.5–1.05)
ESTIMATED AVERAGE GLUCOSE FOR HBA1C: 134 MG/DL
GFR FEMALE: >90 ML/MIN/1.73M2
GLUCOSE (MG/DL) IN SER/PLAS: 159 MG/DL (ref 74–99)
HEMOGLOBIN A1C/HEMOGLOBIN TOTAL IN BLOOD: 6.3 %
MAGNESIUM (MG/DL) IN SER/PLAS: 1.98 MG/DL (ref 1.6–2.4)
POTASSIUM (MMOL/L) IN SER/PLAS: 4.2 MMOL/L (ref 3.5–5.3)
PROTEIN TOTAL: 7.1 G/DL (ref 6.4–8.2)
SODIUM (MMOL/L) IN SER/PLAS: 140 MMOL/L (ref 136–145)
THYROTROPIN (MIU/L) IN SER/PLAS BY DETECTION LIMIT <= 0.05 MIU/L: 1.13 MIU/L (ref 0.44–3.98)
UREA NITROGEN (MG/DL) IN SER/PLAS: 13 MG/DL (ref 6–23)

## 2023-09-13 ENCOUNTER — TELEPHONE (OUTPATIENT)
Dept: PRIMARY CARE | Facility: CLINIC | Age: 73
End: 2023-09-13

## 2023-09-13 ENCOUNTER — CLINICAL SUPPORT (OUTPATIENT)
Dept: PRIMARY CARE | Facility: CLINIC | Age: 73
End: 2023-09-13
Payer: COMMERCIAL

## 2023-09-13 DIAGNOSIS — R53.83 FATIGUE, UNSPECIFIED TYPE: ICD-10-CM

## 2023-09-13 DIAGNOSIS — F41.8 DEPRESSION WITH ANXIETY: ICD-10-CM

## 2023-09-13 DIAGNOSIS — E55.9 VITAMIN D DEFICIENCY: ICD-10-CM

## 2023-09-13 LAB
CALCIDIOL (25 OH VITAMIN D3) (NG/ML) IN SER/PLAS: 32 NG/ML
COBALAMIN (VITAMIN B12) (PG/ML) IN SER/PLAS: 1049 PG/ML (ref 211–911)
FOLATE (NG/ML) IN SER/PLAS: 10.8 NG/ML

## 2023-09-13 PROCEDURE — 82746 ASSAY OF FOLIC ACID SERUM: CPT

## 2023-09-13 PROCEDURE — 82306 VITAMIN D 25 HYDROXY: CPT

## 2023-09-13 PROCEDURE — 82607 VITAMIN B-12: CPT

## 2023-09-13 NOTE — PROGRESS NOTES
Subjective   Patient ID: Cindy Calvillo is a 73 y.o. female who presents for Labs Only (Pt in today for lab draw).    HPI     Review of Systems    Objective   There were no vitals taken for this visit.    Physical Exam    Assessment/Plan

## 2023-09-25 ENCOUNTER — APPOINTMENT (OUTPATIENT)
Dept: PRIMARY CARE | Facility: CLINIC | Age: 73
End: 2023-09-25
Payer: COMMERCIAL

## 2023-09-30 DIAGNOSIS — K21.9 GASTROESOPHAGEAL REFLUX DISEASE, UNSPECIFIED WHETHER ESOPHAGITIS PRESENT: ICD-10-CM

## 2023-10-02 RX ORDER — ONDANSETRON 4 MG/1
TABLET, ORALLY DISINTEGRATING ORAL
Qty: 45 TABLET | Refills: 0 | Status: SHIPPED | OUTPATIENT
Start: 2023-10-02 | End: 2024-04-25 | Stop reason: WASHOUT

## 2023-10-12 ENCOUNTER — APPOINTMENT (OUTPATIENT)
Dept: PRIMARY CARE | Facility: CLINIC | Age: 73
End: 2023-10-12
Payer: COMMERCIAL

## 2023-10-19 ENCOUNTER — OFFICE VISIT (OUTPATIENT)
Dept: PRIMARY CARE | Facility: CLINIC | Age: 73
End: 2023-10-19
Payer: COMMERCIAL

## 2023-10-19 VITALS
DIASTOLIC BLOOD PRESSURE: 80 MMHG | HEART RATE: 70 BPM | HEIGHT: 62 IN | WEIGHT: 229 LBS | SYSTOLIC BLOOD PRESSURE: 146 MMHG | BODY MASS INDEX: 42.14 KG/M2 | OXYGEN SATURATION: 97 %

## 2023-10-19 DIAGNOSIS — K21.9 GASTROESOPHAGEAL REFLUX DISEASE, UNSPECIFIED WHETHER ESOPHAGITIS PRESENT: ICD-10-CM

## 2023-10-19 DIAGNOSIS — E66.01 MORBID OBESITY WITH BMI OF 40.0-44.9, ADULT (MULTI): ICD-10-CM

## 2023-10-19 DIAGNOSIS — R73.03 PREDIABETES: ICD-10-CM

## 2023-10-19 DIAGNOSIS — J30.89 ALLERGIC RHINITIS DUE TO DUST MITE: ICD-10-CM

## 2023-10-19 DIAGNOSIS — E78.2 MIXED HYPERLIPIDEMIA: ICD-10-CM

## 2023-10-19 DIAGNOSIS — I10 ESSENTIAL HYPERTENSION: Primary | ICD-10-CM

## 2023-10-19 DIAGNOSIS — E55.9 VITAMIN D DEFICIENCY: ICD-10-CM

## 2023-10-19 DIAGNOSIS — M81.0 AGE-RELATED OSTEOPOROSIS WITHOUT CURRENT PATHOLOGICAL FRACTURE: ICD-10-CM

## 2023-10-19 PROCEDURE — 90662 IIV NO PRSV INCREASED AG IM: CPT | Performed by: INTERNAL MEDICINE

## 2023-10-19 PROCEDURE — 3008F BODY MASS INDEX DOCD: CPT | Performed by: INTERNAL MEDICINE

## 2023-10-19 PROCEDURE — 1036F TOBACCO NON-USER: CPT | Performed by: INTERNAL MEDICINE

## 2023-10-19 PROCEDURE — 3077F SYST BP >= 140 MM HG: CPT | Performed by: INTERNAL MEDICINE

## 2023-10-19 PROCEDURE — 1160F RVW MEDS BY RX/DR IN RCRD: CPT | Performed by: INTERNAL MEDICINE

## 2023-10-19 PROCEDURE — 3079F DIAST BP 80-89 MM HG: CPT | Performed by: INTERNAL MEDICINE

## 2023-10-19 PROCEDURE — 1159F MED LIST DOCD IN RCRD: CPT | Performed by: INTERNAL MEDICINE

## 2023-10-19 PROCEDURE — 1125F AMNT PAIN NOTED PAIN PRSNT: CPT | Performed by: INTERNAL MEDICINE

## 2023-10-19 PROCEDURE — G0008 ADMIN INFLUENZA VIRUS VAC: HCPCS | Performed by: INTERNAL MEDICINE

## 2023-10-19 PROCEDURE — 99213 OFFICE O/P EST LOW 20 MIN: CPT | Performed by: INTERNAL MEDICINE

## 2023-10-19 RX ORDER — FAMOTIDINE 20 MG/1
TABLET, FILM COATED ORAL
Qty: 180 TABLET | Refills: 1 | Status: SHIPPED | OUTPATIENT
Start: 2023-10-19

## 2023-10-19 RX ORDER — CHOLECALCIFEROL (VITAMIN D3) 1250 MCG
TABLET ORAL
Qty: 13 TABLET | Refills: 1 | Status: SHIPPED | OUTPATIENT
Start: 2023-10-19

## 2023-10-19 RX ORDER — ALENDRONATE SODIUM 35 MG/1
TABLET ORAL
Qty: 13 TABLET | Refills: 0 | Status: SHIPPED | OUTPATIENT
Start: 2023-10-19 | End: 2024-01-09

## 2023-10-19 NOTE — PROGRESS NOTES
"Subjective   Patient ID: Cindy Calvillo is a 73 y.o. female who presents for 2 Month FU (Pt in today for routine 2 month FU).    HPI   Compliant with medications, tolerating regimens.  Physically active.  No particular complaints.  Seen by ALLERGIST, and was placed on nasal regimens which the patient states are better than p.o. regimens, and she is happy with just a little bit of a runny nose, no congestion, no bleeding.  No excessive sneezing.  No wheezing.  No shortness of breath.  CONSTITUTIONALLY, no fever, no chills.  No night sweats.  No lingering anorexia or nausea.  No apparent lymphadenopathy.  No apparent weight loss.    Continues to want to stay healthy, independent, and productive, and does not wish harm to self or others.  Willing to call on BARIATRICS.  ENDOCRINE with no polyuria, polydipsia, polyphagia.  No blurred vision.  No skin, hair, nail changes.  No dramatic weight loss or weight gain.  Likewise, no headache, blurred vision, diplopia.  No dysphagia.  Continues to want to stay healthy, independent, and productive, and does not wish harm to self or others.    States that at home, blood pressure running in the 130s and 140s systolically.  Otherwise, no lee substernal chest pain, no orthopnea, no paroxysmal nocturnal dyspnea.  Appetite preserved, no abdominal distress.  No dysuria, flank, suprapubic pain.  No particular skin changes, no jaundice.        Review of Systems  Review of systems as in history of present illness, and otherwise, reviewed separately as well, and was unremarkable/negative/noncontributory.          Objective   /80 (BP Location: Left arm, Patient Position: Sitting)   Pulse 70   Ht 1.575 m (5' 2\")   Wt 104 kg (229 lb)   SpO2 97%   BMI 41.88 kg/m²     Physical Exam  In very good spirits.  Not in distress or diaphoresis.  Alert, oriented x3.  Amiable.  Not unkempt.  Receptive.  Cheerful.  Appropriate.  Eager to continue to improve quality of life, and does not wish " harm to self or others.  Obese, but capable of instrumental activities independently.    HEAD pink palpebral conjunctivae, anicteric sclerae.  NECK supple, no apparent jugular venous distention.  CARDIOVASCULAR not in distress or diaphoresis.  No bipedal edema.  LUNGS not in distress or diaphoresis.  Not using accessory muscles.  ABDOMEN soft, nontender.  BACK no costovertebral angle tenderness.  EXTREMITIES no clubbing, no cyanosis.  NEURO no facial asymmetry.  No apparent cranial nerve deficits.  Romberg negative.  Ambulating without need of assistance.  No apparent focal weakness.  No tremors.  PSYCH receptive, appropriate, and eager to maintain and improve quality of life.      LABORATORY examinations reviewed with patient.        Assessment/Plan   Problem List Items Addressed This Visit             ICD-10-CM    Allergic rhinitis due to dust mite J30.89    Relevant Orders    Flu vaccine, quadrivalent, high-dose, preservative free, age 65y+ (FLUZONE) (Completed)    Follow Up In Primary Care - Established    Essential hypertension - Primary I10    Relevant Orders    Flu vaccine, quadrivalent, high-dose, preservative free, age 65y+ (FLUZONE) (Completed)    Follow Up In Primary Care - Established    Comprehensive Metabolic Panel    TSH with reflex to Free T4 if abnormal    GERD (gastroesophageal reflux disease) K21.9    Relevant Medications    famotidine (Pepcid) 20 mg tablet    Other Relevant Orders    Flu vaccine, quadrivalent, high-dose, preservative free, age 65y+ (FLUZONE) (Completed)    Follow Up In Primary Care - Established    Mixed hyperlipidemia E78.2    Relevant Orders    Flu vaccine, quadrivalent, high-dose, preservative free, age 65y+ (FLUZONE) (Completed)    Follow Up In Primary Care - Established    Comprehensive Metabolic Panel    Lipid Panel    Morbid obesity with BMI of 40.0-44.9, adult (CMS/[facility]) E66.01, Z68.41    Relevant Orders    Flu vaccine, quadrivalent, high-dose, preservative free, age 65y+  (FLUZONE) (Completed)    Referral to Bariatric Surgery    Follow Up In Primary Care - Established    TSH with reflex to Free T4 if abnormal    Prediabetes R73.03    Relevant Orders    Flu vaccine, quadrivalent, high-dose, preservative free, age 65y+ (FLUZONE) (Completed)    Referral to Bariatric Surgery    Follow Up In Primary Care - Established    Comprehensive Metabolic Panel    Hemoglobin A1C    Vitamin D deficiency E55.9    Relevant Medications    cholecalciferol (Vitamin D3) 1,250 mcg (50,000 unit) tablet    Other Relevant Orders    Flu vaccine, quadrivalent, high-dose, preservative free, age 65y+ (FLUZONE) (Completed)    Follow Up In Primary Care - Established     Other Visit Diagnoses         Codes    Age-related osteoporosis without current pathological fracture     M81.0    Relevant Medications    cholecalciferol (Vitamin D3) 1,250 mcg (50,000 unit) tablet    alendronate (Fosamax) 35 mg tablet    Other Relevant Orders    Flu vaccine, quadrivalent, high-dose, preservative free, age 65y+ (FLUZONE) (Completed)    Follow Up In Primary Care - Established             Thank you very much for coming.  I am very happy to see you.    Your blood pressure is borderline.  Please drink lots of fluids, and please continue to avoid salt.  Good for blood pressure control, and good for keeping your kidneys healthy.  Likewise, please stay active with aerobic activities like brisk walking, cycling, swimming, vigorous activities and exercises please.  Very good for blood pressure control, as well as mood and energy, and even weight management!    Please check your blood pressure in the evening, in a seated position, with feet flat on floor, and back to the backrest.  Please let me know if the first number your blood pressure is persistently 140 or higher.  Please let me know if the second number of your blood pressure is persistently 85 or higher.  Please call me if your heart rate is 90 or higher at rest.    Please continue to  eat sensibly and to stay physically active, as above.  [place] diet book, DASH diet for ideas.    Let us see if your insurance will allow you to join the BARIATRICS program.    Please continue following with your ALLERGIST.  I am glad to hear that you are doing better.    You do have OSTEOPOROSIS, for which you will benefit from VITAMIN D once a week, Sundays, with lunch and a full glass of water,  As well as ALENDRONATE 35 mg by mouth Sunday morning.  Take first thing in the morning, on an empty stomach, followed by a glass of water.  Please stay upright for at least 30 minutes.    INFLUENZA vaccination, HIGH DOSE, today please.  In 1 week, you can schedule yourself for pneumonia vaccination PREVNAR 20.  In another week, you can schedule yourself for COVID, Moderna preferred, but Pfizer also okay.    Again, thank you very much for coming.  Please do not hesitate to call with questions or concerns.  Please continue to take care of yourself and your family, and please continue to pray for our recovery from this pandemic.  When you return in January, we can discuss preventive strategies, including colonoscopy.  You will also be due for FASTING laboratory examinations to be done at any  facility of your choice a few days before your next appointment.  See you then.    I hope you have a good Thanksgiving, a good Chanel, and a happy new year!  See you in January.            0  Return in 3 months.  20 minutes please.  Repeat FASTING laboratory examinations outside, then see me soon after.  Reassess hemodynamics, cardiovascular risk, mood, energy, function, coordinate with bariatrics, weight management.  Offer colonoscopy once more.            0

## 2023-10-19 NOTE — PATIENT INSTRUCTIONS
Thank you very much for coming.  I am very happy to see you.    Your blood pressure is borderline.  Please drink lots of fluids, and please continue to avoid salt.  Good for blood pressure control, and good for keeping your kidneys healthy.  Likewise, please stay active with aerobic activities like brisk walking, cycling, swimming, vigorous activities and exercises please.  Very good for blood pressure control, as well as mood and energy, and even weight management!    Please check your blood pressure in the evening, in a seated position, with feet flat on floor, and back to the backrest.  Please let me know if the first number your blood pressure is persistently 140 or higher.  Please let me know if the second number of your blood pressure is persistently 85 or higher.  Please call me if your heart rate is 90 or higher at rest.    Please continue to eat sensibly and to stay physically active, as above.  [place] diet book, DASH diet for ideas.    Let us see if your insurance will allow you to join the BARIATRICS program.    Please continue following with your ALLERGIST.  I am glad to hear that you are doing better.    You do have OSTEOPOROSIS, for which you will benefit from VITAMIN D once a week, Sundays, with lunch and a full glass of water,  As well as ALENDRONATE 35 mg by mouth Sunday morning.  Take first thing in the morning, on an empty stomach, followed by a glass of water.  Please stay upright for at least 30 minutes.    INFLUENZA vaccination, HIGH DOSE, today please.  In 1 week, you can schedule yourself for pneumonia vaccination PREVNAR 20.  In another week, you can schedule yourself for COVID, Moderna preferred, but Pfizer also okay.    Again, thank you very much for coming.  Please do not hesitate to call with questions or concerns.  Please continue to take care of yourself and your family, and please continue to pray for our recovery from this pandemic.  When you return in January, we can discuss  preventive strategies, including colonoscopy.  You will also be due for FASTING laboratory examinations to be done at any  facility of your choice a few days before your next appointment.  See you then.    I hope you have a good Thanksgiving, a good Chanel, and a happy new year!  See you in January.            0  Return in 3 months.  20 minutes please.  Repeat FASTING laboratory examinations outside, then see me soon after.  Reassess hemodynamics, cardiovascular risk, mood, energy, function, coordinate with bariatrics, weight management.  Offer colonoscopy once more.            0  Yes

## 2023-11-16 ENCOUNTER — TELEPHONE (OUTPATIENT)
Dept: OTHER | Age: 73
End: 2023-11-16
Payer: COMMERCIAL

## 2023-11-16 DIAGNOSIS — F41.8 DEPRESSION WITH ANXIETY: ICD-10-CM

## 2023-11-16 RX ORDER — VENLAFAXINE HYDROCHLORIDE 150 MG/1
150 CAPSULE, EXTENDED RELEASE ORAL DAILY
Qty: 90 CAPSULE | Refills: 0 | Status: SHIPPED | OUTPATIENT
Start: 2023-11-16 | End: 2024-01-04 | Stop reason: SDUPTHER

## 2023-12-11 DIAGNOSIS — M48.02 DEGENERATIVE CERVICAL SPINAL STENOSIS: ICD-10-CM

## 2023-12-12 RX ORDER — GABAPENTIN 100 MG/1
CAPSULE ORAL
Qty: 360 CAPSULE | Refills: 0 | Status: SHIPPED | OUTPATIENT
Start: 2023-12-12 | End: 2024-03-26 | Stop reason: DRUGHIGH

## 2024-01-04 ENCOUNTER — OFFICE VISIT (OUTPATIENT)
Dept: BEHAVIORAL HEALTH | Facility: CLINIC | Age: 74
End: 2024-01-04
Payer: COMMERCIAL

## 2024-01-04 DIAGNOSIS — F41.8 DEPRESSION WITH ANXIETY: ICD-10-CM

## 2024-01-04 PROCEDURE — 1160F RVW MEDS BY RX/DR IN RCRD: CPT | Performed by: PSYCHIATRY & NEUROLOGY

## 2024-01-04 PROCEDURE — 3008F BODY MASS INDEX DOCD: CPT | Performed by: PSYCHIATRY & NEUROLOGY

## 2024-01-04 PROCEDURE — 1125F AMNT PAIN NOTED PAIN PRSNT: CPT | Performed by: PSYCHIATRY & NEUROLOGY

## 2024-01-04 PROCEDURE — 1159F MED LIST DOCD IN RCRD: CPT | Performed by: PSYCHIATRY & NEUROLOGY

## 2024-01-04 PROCEDURE — 1036F TOBACCO NON-USER: CPT | Performed by: PSYCHIATRY & NEUROLOGY

## 2024-01-04 PROCEDURE — 99213 OFFICE O/P EST LOW 20 MIN: CPT | Performed by: PSYCHIATRY & NEUROLOGY

## 2024-01-04 PROCEDURE — 99213 OFFICE O/P EST LOW 20 MIN: CPT | Mod: AM | Performed by: PSYCHIATRY & NEUROLOGY

## 2024-01-04 RX ORDER — VENLAFAXINE HYDROCHLORIDE 150 MG/1
150 CAPSULE, EXTENDED RELEASE ORAL DAILY
Qty: 90 CAPSULE | Refills: 0 | Status: SHIPPED | OUTPATIENT
Start: 2024-01-04 | End: 2024-04-25 | Stop reason: SDUPTHER

## 2024-01-04 SDOH — ECONOMIC STABILITY: HOUSING INSECURITY
IN THE LAST 12 MONTHS, WAS THERE A TIME WHEN YOU DID NOT HAVE A STEADY PLACE TO SLEEP OR SLEPT IN A SHELTER (INCLUDING NOW)?: NO

## 2024-01-04 SDOH — ECONOMIC STABILITY: GENERAL
WHICH OF THE FOLLOWING WOULD YOU LIKE TO GET CONNECTED TO IN ORDER TO RECEIVE A DISCOUNT OR FOR FREE? (CHOOSE ALL THAT APPLY): NONE OF THESE

## 2024-01-04 SDOH — ECONOMIC STABILITY: TRANSPORTATION INSECURITY
IN THE PAST 12 MONTHS, HAS LACK OF TRANSPORTATION KEPT YOU FROM MEETINGS, WORK, OR FROM GETTING THINGS NEEDED FOR DAILY LIVING?: NO

## 2024-01-04 SDOH — ECONOMIC STABILITY: FOOD INSECURITY: WITHIN THE PAST 12 MONTHS, THE FOOD YOU BOUGHT JUST DIDN'T LAST AND YOU DIDN'T HAVE MONEY TO GET MORE.: NEVER TRUE

## 2024-01-04 SDOH — ECONOMIC STABILITY: INCOME INSECURITY: IN THE LAST 12 MONTHS, WAS THERE A TIME WHEN YOU WERE NOT ABLE TO PAY THE MORTGAGE OR RENT ON TIME?: NO

## 2024-01-04 SDOH — ECONOMIC STABILITY: TRANSPORTATION INSECURITY
IN THE PAST 12 MONTHS, HAS THE LACK OF TRANSPORTATION KEPT YOU FROM MEDICAL APPOINTMENTS OR FROM GETTING MEDICATIONS?: NO

## 2024-01-04 SDOH — HEALTH STABILITY: PHYSICAL HEALTH: ON AVERAGE, HOW MANY MINUTES DO YOU ENGAGE IN EXERCISE AT THIS LEVEL?: 20 MIN

## 2024-01-04 SDOH — ECONOMIC STABILITY: FOOD INSECURITY: WITHIN THE PAST 12 MONTHS, YOU WORRIED THAT YOUR FOOD WOULD RUN OUT BEFORE YOU GOT MONEY TO BUY MORE.: NEVER TRUE

## 2024-01-04 SDOH — HEALTH STABILITY: PHYSICAL HEALTH: ON AVERAGE, HOW MANY DAYS PER WEEK DO YOU ENGAGE IN MODERATE TO STRENUOUS EXERCISE (LIKE A BRISK WALK)?: 7 DAYS

## 2024-01-04 SDOH — ECONOMIC STABILITY: HOUSING INSECURITY: IN THE LAST 12 MONTHS, HOW MANY PLACES HAVE YOU LIVED?: 1

## 2024-01-04 SDOH — ECONOMIC STABILITY: GENERAL
WHICH OF THE FOLLOWING DO YOU KNOW HOW TO USE AND HAVE ACCESS TO EVERY DAY? (CHOOSE ALL THAT APPLY): DESKTOP COMPUTER, LAPTOP COMPUTER, OR TABLET WITH BROADBAND INTERNET CONNECTION;SMARTPHONE WITH CELLULAR DATA PLAN

## 2024-01-04 ASSESSMENT — SOCIAL DETERMINANTS OF HEALTH (SDOH)
HOW OFTEN DO YOU ATTEND CHURCH OR RELIGIOUS SERVICES?: NEVER
WITHIN THE LAST YEAR, HAVE YOU BEEN AFRAID OF YOUR PARTNER OR EX-PARTNER?: NO
WITHIN THE LAST YEAR, HAVE YOU BEEN KICKED, HIT, SLAPPED, OR OTHERWISE PHYSICALLY HURT BY YOUR PARTNER OR EX-PARTNER?: NO
HOW HARD IS IT FOR YOU TO PAY FOR THE VERY BASICS LIKE FOOD, HOUSING, MEDICAL CARE, AND HEATING?: NOT HARD AT ALL
WITHIN THE LAST YEAR, HAVE TO BEEN RAPED OR FORCED TO HAVE ANY KIND OF SEXUAL ACTIVITY BY YOUR PARTNER OR EX-PARTNER?: NO
HOW OFTEN DO YOU GET TOGETHER WITH FRIENDS OR RELATIVES?: MORE THAN THREE TIMES A WEEK
WITHIN THE LAST YEAR, HAVE YOU BEEN HUMILIATED OR EMOTIONALLY ABUSED IN OTHER WAYS BY YOUR PARTNER OR EX-PARTNER?: NO
IN A TYPICAL WEEK, HOW MANY TIMES DO YOU TALK ON THE PHONE WITH FAMILY, FRIENDS, OR NEIGHBORS?: MORE THAN THREE TIMES A WEEK
HOW OFTEN DO YOU ATTENT MEETINGS OF THE CLUB OR ORGANIZATION YOU BELONG TO?: 1 TO 4 TIMES PER YEAR
IN THE PAST 12 MONTHS, HAS THE ELECTRIC, GAS, OIL, OR WATER COMPANY THREATENED TO SHUT OFF SERVICE IN YOUR HOME?: NO
DO YOU BELONG TO ANY CLUBS OR ORGANIZATIONS SUCH AS CHURCH GROUPS UNIONS, FRATERNAL OR ATHLETIC GROUPS, OR SCHOOL GROUPS?: YES

## 2024-01-04 ASSESSMENT — LIFESTYLE VARIABLES
HOW OFTEN DO YOU HAVE A DRINK CONTAINING ALCOHOL: NEVER
HOW OFTEN DO YOU HAVE SIX OR MORE DRINKS ON ONE OCCASION: NEVER
SKIP TO QUESTIONS 9-10: 1
AUDIT-C TOTAL SCORE: 0
HOW MANY STANDARD DRINKS CONTAINING ALCOHOL DO YOU HAVE ON A TYPICAL DAY: PATIENT DOES NOT DRINK

## 2024-01-04 NOTE — PROGRESS NOTES
Subjective   Patient ID: Cindy Calvillo is a 73 y.o. female who presents for No chief complaint on file.. I am doing alright.    The patient is alert, fully oriented, language is intact, and recent and remote memory intact. The patient denies any suicidal or homicidal ideation or plans. The patient presents with no depressive, manic or psychotic symptoms. Thought is logical and clear. No disturbances of judgment or insight are exhibited. No behavioral disturbances are present on examination.        Review of Systems   Neurological:         The patient is alert, fully oriented, language is intact, and recent and remote memory intact. The patient denies any suicidal or homicidal ideation or plans. The patient presents with no depressive, manic or psychotic symptoms. Thought is logical and clear. No disturbances of judgment or insight are exhibited. No behavioral disturbances are present on examination.     Psychiatric/Behavioral:          The patient is alert, fully oriented, language is intact, and recent and remote memory intact. The patient denies any suicidal or homicidal ideation or plans. The patient presents with no depressive, manic or psychotic symptoms. Thought is logical and clear. No disturbances of judgment or insight are exhibited. No behavioral disturbances are present on examination.     All other systems reviewed and are negative.      Objective   Physical Exam  Constitutional:       Appearance: Normal appearance.   Neurological:      Mental Status: She is alert.      Comments: The patient is alert, fully oriented, language is intact, and recent and remote memory intact. The patient denies any suicidal or homicidal ideation or plans. The patient presents with no depressive, manic or psychotic symptoms. Thought is logical and clear. No disturbances of judgment or insight are exhibited. No behavioral disturbances are present on examination.     Psychiatric:         Mood and Affect: Mood normal.       Comments: The patient is alert, fully oriented, language is intact, and recent and remote memory intact. The patient denies any suicidal or homicidal ideation or plans. The patient presents with no depressive, manic or psychotic symptoms. Thought is logical and clear. No disturbances of judgment or insight are exhibited. No behavioral disturbances are present on examination.           Lab Review:       Assessment/Plan   The risks, benefits & alternatives to the medications prescribed were explained to you today. You were able to understand & repeat these risks, benefits & alternatives to this prescribed medication. You have agreed to proceed with treatment with the medications discussed based on the conclusion that the benefit outweighs the risks of this treatment regimen: continue venlafaxine 150 mg ER daily. Follow up in late May of 2024.

## 2024-01-09 DIAGNOSIS — M81.0 AGE-RELATED OSTEOPOROSIS WITHOUT CURRENT PATHOLOGICAL FRACTURE: ICD-10-CM

## 2024-01-09 RX ORDER — ALENDRONATE SODIUM 35 MG/1
TABLET ORAL
Qty: 13 TABLET | Refills: 0 | Status: SHIPPED | OUTPATIENT
Start: 2024-01-09 | End: 2024-04-09

## 2024-01-19 DIAGNOSIS — J98.01 BRONCHOSPASM: ICD-10-CM

## 2024-01-19 DIAGNOSIS — J30.1 SEASONAL ALLERGIC RHINITIS DUE TO POLLEN: ICD-10-CM

## 2024-01-19 DIAGNOSIS — F33.1 MODERATE EPISODE OF RECURRENT MAJOR DEPRESSIVE DISORDER (MULTI): Primary | ICD-10-CM

## 2024-01-19 DIAGNOSIS — I10 ESSENTIAL HYPERTENSION: ICD-10-CM

## 2024-01-19 RX ORDER — DILTIAZEM HYDROCHLORIDE 120 MG/1
120 CAPSULE, EXTENDED RELEASE ORAL DAILY
Qty: 90 CAPSULE | Refills: 1 | Status: SHIPPED | OUTPATIENT
Start: 2024-01-19 | End: 2025-01-13

## 2024-01-19 RX ORDER — ALBUTEROL SULFATE 90 UG/1
2 AEROSOL, METERED RESPIRATORY (INHALATION) EVERY 4 HOURS PRN
Qty: 18 G | Refills: 2 | Status: SHIPPED | OUTPATIENT
Start: 2024-01-19 | End: 2024-04-03 | Stop reason: SDUPTHER

## 2024-01-19 RX ORDER — LISINOPRIL 5 MG/1
TABLET ORAL
Qty: 90 TABLET | Refills: 1 | Status: SHIPPED | OUTPATIENT
Start: 2024-01-19

## 2024-01-19 RX ORDER — FLUTICASONE PROPIONATE 50 MCG
SPRAY, SUSPENSION (ML) NASAL
Qty: 16 ML | Refills: 5 | Status: SHIPPED | OUTPATIENT
Start: 2024-01-19

## 2024-02-08 ENCOUNTER — OFFICE VISIT (OUTPATIENT)
Dept: PRIMARY CARE | Facility: CLINIC | Age: 74
End: 2024-02-08
Payer: COMMERCIAL

## 2024-02-08 VITALS
WEIGHT: 229 LBS | BODY MASS INDEX: 42.14 KG/M2 | HEIGHT: 62 IN | SYSTOLIC BLOOD PRESSURE: 140 MMHG | HEART RATE: 77 BPM | DIASTOLIC BLOOD PRESSURE: 79 MMHG | OXYGEN SATURATION: 98 % | RESPIRATION RATE: 18 BRPM

## 2024-02-08 DIAGNOSIS — E78.2 MIXED HYPERLIPIDEMIA: ICD-10-CM

## 2024-02-08 DIAGNOSIS — R73.03 PREDIABETES: ICD-10-CM

## 2024-02-08 DIAGNOSIS — E55.9 VITAMIN D DEFICIENCY: ICD-10-CM

## 2024-02-08 DIAGNOSIS — I10 ESSENTIAL HYPERTENSION: Primary | ICD-10-CM

## 2024-02-08 DIAGNOSIS — G47.33 OSA ON CPAP: ICD-10-CM

## 2024-02-08 DIAGNOSIS — E79.0 HYPERURICEMIA: ICD-10-CM

## 2024-02-08 DIAGNOSIS — F33.1 MODERATE EPISODE OF RECURRENT MAJOR DEPRESSIVE DISORDER (MULTI): ICD-10-CM

## 2024-02-08 DIAGNOSIS — E66.01 MORBID OBESITY WITH BMI OF 40.0-44.9, ADULT (MULTI): ICD-10-CM

## 2024-02-08 LAB
ALBUMIN SERPL BCP-MCNC: 4.2 G/DL (ref 3.4–5)
ALP SERPL-CCNC: 51 U/L (ref 33–136)
ALT SERPL W P-5'-P-CCNC: 31 U/L (ref 7–45)
ANION GAP SERPL CALC-SCNC: 12 MMOL/L (ref 10–20)
AST SERPL W P-5'-P-CCNC: 18 U/L (ref 9–39)
BILIRUB SERPL-MCNC: 0.8 MG/DL (ref 0–1.2)
BUN SERPL-MCNC: 17 MG/DL (ref 6–23)
CALCIUM SERPL-MCNC: 9.5 MG/DL (ref 8.6–10.3)
CHLORIDE SERPL-SCNC: 102 MMOL/L (ref 98–107)
CHOLEST SERPL-MCNC: 218 MG/DL (ref 0–199)
CHOLESTEROL/HDL RATIO: 5.2
CO2 SERPL-SCNC: 30 MMOL/L (ref 21–32)
CREAT SERPL-MCNC: 0.76 MG/DL (ref 0.5–1.05)
EGFRCR SERPLBLD CKD-EPI 2021: 84 ML/MIN/1.73M*2
EST. AVERAGE GLUCOSE BLD GHB EST-MCNC: 134 MG/DL
GLUCOSE SERPL-MCNC: 165 MG/DL (ref 74–99)
HBA1C MFR BLD: 6.3 %
HDLC SERPL-MCNC: 42.3 MG/DL
LDLC SERPL CALC-MCNC: 144 MG/DL
NON HDL CHOLESTEROL: 176 MG/DL (ref 0–149)
POTASSIUM SERPL-SCNC: 4.4 MMOL/L (ref 3.5–5.3)
PROT SERPL-MCNC: 7 G/DL (ref 6.4–8.2)
SODIUM SERPL-SCNC: 140 MMOL/L (ref 136–145)
TRIGL SERPL-MCNC: 158 MG/DL (ref 0–149)
TSH SERPL-ACNC: 0.81 MIU/L (ref 0.44–3.98)
URATE SERPL-MCNC: 5.8 MG/DL (ref 2.3–6.7)
VLDL: 32 MG/DL (ref 0–40)

## 2024-02-08 PROCEDURE — 80053 COMPREHEN METABOLIC PANEL: CPT

## 2024-02-08 PROCEDURE — 80061 LIPID PANEL: CPT

## 2024-02-08 PROCEDURE — 84550 ASSAY OF BLOOD/URIC ACID: CPT

## 2024-02-08 PROCEDURE — 1160F RVW MEDS BY RX/DR IN RCRD: CPT | Performed by: INTERNAL MEDICINE

## 2024-02-08 PROCEDURE — 1159F MED LIST DOCD IN RCRD: CPT | Performed by: INTERNAL MEDICINE

## 2024-02-08 PROCEDURE — 1036F TOBACCO NON-USER: CPT | Performed by: INTERNAL MEDICINE

## 2024-02-08 PROCEDURE — 36415 COLL VENOUS BLD VENIPUNCTURE: CPT

## 2024-02-08 PROCEDURE — 1125F AMNT PAIN NOTED PAIN PRSNT: CPT | Performed by: INTERNAL MEDICINE

## 2024-02-08 PROCEDURE — 83036 HEMOGLOBIN GLYCOSYLATED A1C: CPT

## 2024-02-08 PROCEDURE — 3008F BODY MASS INDEX DOCD: CPT | Performed by: INTERNAL MEDICINE

## 2024-02-08 PROCEDURE — 84443 ASSAY THYROID STIM HORMONE: CPT

## 2024-02-08 PROCEDURE — 3078F DIAST BP <80 MM HG: CPT | Performed by: INTERNAL MEDICINE

## 2024-02-08 PROCEDURE — 99213 OFFICE O/P EST LOW 20 MIN: CPT | Performed by: INTERNAL MEDICINE

## 2024-02-08 PROCEDURE — 3077F SYST BP >= 140 MM HG: CPT | Performed by: INTERNAL MEDICINE

## 2024-02-08 NOTE — PATIENT INSTRUCTIONS
Thank you very much for coming.  I am very happy to see you.    You are due for FASTING laboratory examinations.  I am glad that you are fasting today.  BLOOD examinations, and I will send word regarding results and possible changes.    I am glad to hear that you are seeing a THERAPIST.  It is definitely worth $50 if you are feeling better, and you are motivated to take care of yourself and do better!  Please continue to see your therapist.    Please continue checking your blood pressure properly.  I am glad to hear that you know how to use your wrist blood pressure cuff.  Please call me if the first number is persistently 145 or higher.  Please call me if the second number is persistently 88 or higher.    Go ahead and use the services that your insurance is offering you.  Have the visiting nurses evaluate you at home.    Please get in touch with BARIATRICS again.  Do not give up!  Thank you for giving up snacking.  Please continue staying physically active.  Brisk walking is also good for mood and energy, as well as blood pressure control and weight management!    Please get in touch with the [place] [facility] doctors and go ahead and reschedule your COLONOSCOPY.    Please get your PNEUMONIA vaccination PREVNAR 20 from your local friendly pharmacy!  I am glad to hear that you are looking forward to getting this done.    Please come back in APRIL 15, around that time.  It will be a good time to do your MEDICARE visit for the year.  Until then, please continue to take care of yourself and your family, and please continue to pray for our recovery from this pandemic.            0  In better spirits, better mood, and more motivated to take care of self and continues to improve quality of life.  Not at all suicidal or homicidal, and feels that it is worth her money to see her therapist.  I told her that this is a good sign!    Plans as above.  Return in a little over 2 months.  40 minutes please.  Medicare Wellness  evaluation.  Review fasting laboratory results.  Coordinate with bariatrics, GI, colonoscopy results.  Review preventive strategies, cardiovascular risk.  Reassess mood, energy, function.  Coordinate with therapist.            0

## 2024-02-08 NOTE — PROGRESS NOTES
Subjective   Patient ID: Cindy aClvillo is a 73 y.o. female who presents for Follow-up.    HPI   The patient seems to be doing well with her THERAPIST, and even states that it is worth the money that she is being them to be seen.  Compliant with medications, tolerating regimens.  Motivated to take care of self, although there has been some delay in having her COLONOSCOPY done, and she has yet to be successful in establishing with BARIATRICS.  She also is due for her FASTING laboratory examinations.  She is ready, she has not eaten today.    No headache, blurred vision, diplopia.  No dysphagia.  No focal weakness, ataxia, clumsiness.  No falls.  No paresthesia.  No confusion or delirium, with patient not worried about memory.  Not depressive or suicidal, with patient not wishing harm to self or others.  No lee chest pain.  No orthopnea, no paroxysmal nocturnal dyspnea.  No dizziness, diaphoresis, palpitations.  No loss of consciousness.  No bipedal edema.  No remarkable dyspnea on exertion.  No particular cough, no particular sputum production.  CONSTITUTIONALLY, no fever, no chills.  No night sweats.  No lingering anorexia or nausea.  No apparent lymphadenopathy.  No apparent weight loss.    Appetite preserved, with no nausea, vomiting, abdominal distress.  No diarrhea, no constipation.  No apparent blood in stool.  No apparent weight loss.  No dysuria, flank, suprapubic pain.  No discharge, no pruritus.  No rash.  No malodor.  No hesitancy, no feeling of incomplete voiding.  No skin changes, rashes, pruritus, jaundice.  No easy bruisability.  ENDOCRINE with no polyuria, polydipsia, polyphagia.  No blurred vision.  No skin, hair, nail changes.  No dramatic weight loss or weight gain.          Review of Systems  Review of systems as in history of present illness, and otherwise, reviewed separately as well, and was unremarkable/negative/noncontributory.        Objective   /79 (BP Location: Left arm, Patient  "Position: Sitting, BP Cuff Size: Adult)   Pulse 77   Resp 18   Ht 1.575 m (5' 2\")   Wt 104 kg (229 lb)   SpO2 98%   BMI 41.88 kg/m²     Physical Exam  In good spirits.  Not in distress or diaphoresis.  Alert, oriented x 3.  Amiable.  Not unkempt.  Receptive, cheerful, appropriate.  Obese, but completely independent and capable.  Eager to continue to take care of self.  Not wishing harm to self or others.    HEAD pink palpebral conjunctivae, anicteric sclerae.  NECK supple, no apparent jugular venous distention.  CARDIOVASCULAR not in distress or diaphoresis.  No bipedal edema.  LUNGS not in distress or diaphoresis.  Not using accessory muscles.  ABDOMEN soft, nontender.  BACK no costovertebral angle tenderness.  EXTREMITIES no clubbing, no cyanosis.  NEURO no facial asymmetry.  No apparent cranial nerve deficits.  Romberg negative.  Ambulating without need of assistance.  No apparent focal weakness.  No tremors.  PSYCH receptive, appropriate, and eager to maintain and improve quality of life.      LABORATORY results reviewed, needing updating.      Assessment/Plan   Diagnoses and all orders for this visit:  Essential hypertension  -     TSH with reflex to Free T4 if abnormal  -     Comprehensive Metabolic Panel  -     Follow Up In Primary Care - Established; Future  Mixed hyperlipidemia  -     Lipid Panel  -     Comprehensive Metabolic Panel  -     Follow Up In Primary Care - Established; Future  Prediabetes  -     Hemoglobin A1C  -     Comprehensive Metabolic Panel  -     Follow Up In Primary Care - Established; Future  Morbid obesity with BMI of 40.0-44.9, adult (CMS/[facility])  -     Follow Up In Primary Care - Established; Future  [place] on CPAP  -     TSH with reflex to Free T4 if abnormal  -     Follow Up In Primary Care - Established; Future  Hyperuricemia  -     Comprehensive Metabolic Panel  -     Uric Acid  -     Follow Up In Primary Care - Established; Future  Vitamin D deficiency  -     Follow Up In Primary " Care - Established; Future  Moderate episode of recurrent major depressive disorder (CMS/HCC)  -     Follow Up In Primary Care - Established; Future       Thank you very much for coming.  I am very happy to see you.    You are due for FASTING laboratory examinations.  I am glad that you are fasting today.  BLOOD examinations, and I will send word regarding results and possible changes.    I am glad to hear that you are seeing a THERAPIST.  It is definitely worth $50 if you are feeling better, and you are motivated to take care of yourself and do better!  Please continue to see your therapist.    Please continue checking your blood pressure properly.  I am glad to hear that you know how to use your wrist blood pressure cuff.  Please call me if the first number is persistently 145 or higher.  Please call me if the second number is persistently 88 or higher.    Go ahead and use the services that your insurance is offering you.  Have the visiting nurses evaluate you at home.    Please get in touch with BARIATRICS again.  Do not give up!  Thank you for giving up snacking.  Please continue staying physically active.  Brisk walking is also good for mood and energy, as well as blood pressure control and weight management!    Please get in touch with the [place] [facility] doctors and go ahead and reschedule your COLONOSCOPY.    Please get your PNEUMONIA vaccination PREVNAR 20 from your local friendly pharmacy!  I am glad to hear that you are looking forward to getting this done.    Please come back in APRIL 15, around that time.  It will be a good time to do your MEDICARE visit for the year.  Until then, please continue to take care of yourself and your family, and please continue to pray for our recovery from this pandemic.            0  In better spirits, better mood, and more motivated to take care of self and continues to improve quality of life.  Not at all suicidal or homicidal, and feels that it is worth her money  to see her therapist.  I told her that this is a good sign!    Plans as above.  Return in a little over 2 months.  40 minutes please.  Medicare Wellness evaluation.  Review fasting laboratory results.  Coordinate with bariatrics, GI, colonoscopy results.  Review preventive strategies, cardiovascular risk.  Reassess mood, energy, function.  Coordinate with therapist.            0

## 2024-03-07 ENCOUNTER — OFFICE VISIT (OUTPATIENT)
Dept: NEUROSURGERY | Facility: CLINIC | Age: 74
End: 2024-03-07
Payer: COMMERCIAL

## 2024-03-07 VITALS
HEART RATE: 77 BPM | SYSTOLIC BLOOD PRESSURE: 130 MMHG | HEIGHT: 62 IN | BODY MASS INDEX: 41.41 KG/M2 | WEIGHT: 225 LBS | DIASTOLIC BLOOD PRESSURE: 66 MMHG | TEMPERATURE: 96.8 F

## 2024-03-07 DIAGNOSIS — M47.812 CERVICAL SPONDYLOSIS WITHOUT MYELOPATHY: ICD-10-CM

## 2024-03-07 DIAGNOSIS — M48.02 DEGENERATIVE CERVICAL SPINAL STENOSIS: Primary | ICD-10-CM

## 2024-03-07 PROCEDURE — 3008F BODY MASS INDEX DOCD: CPT | Performed by: NEUROLOGICAL SURGERY

## 2024-03-07 PROCEDURE — 3078F DIAST BP <80 MM HG: CPT | Performed by: NEUROLOGICAL SURGERY

## 2024-03-07 PROCEDURE — 1160F RVW MEDS BY RX/DR IN RCRD: CPT | Performed by: NEUROLOGICAL SURGERY

## 2024-03-07 PROCEDURE — 3075F SYST BP GE 130 - 139MM HG: CPT | Performed by: NEUROLOGICAL SURGERY

## 2024-03-07 PROCEDURE — 99212 OFFICE O/P EST SF 10 MIN: CPT | Performed by: NEUROLOGICAL SURGERY

## 2024-03-07 PROCEDURE — 1036F TOBACCO NON-USER: CPT | Performed by: NEUROLOGICAL SURGERY

## 2024-03-07 PROCEDURE — 1159F MED LIST DOCD IN RCRD: CPT | Performed by: NEUROLOGICAL SURGERY

## 2024-03-07 PROCEDURE — 1125F AMNT PAIN NOTED PAIN PRSNT: CPT | Performed by: NEUROLOGICAL SURGERY

## 2024-03-07 ASSESSMENT — PATIENT HEALTH QUESTIONNAIRE - PHQ9
1. LITTLE INTEREST OR PLEASURE IN DOING THINGS: NOT AT ALL
SUM OF ALL RESPONSES TO PHQ9 QUESTIONS 1 & 2: 0
2. FEELING DOWN, DEPRESSED OR HOPELESS: NOT AT ALL

## 2024-03-07 ASSESSMENT — PAIN SCALES - GENERAL: PAINLEVEL: 1

## 2024-03-07 NOTE — PROGRESS NOTES
[facility] [place]  Department of Neurological Surgery  Established Patient Visit    History of Present Illness  Cindy Calvillo is a 73 y.o. year old female who presents to the spine clinic in follow up for cervical stenosis.  I initially saw her in March 2023 for neck pain.  Her MRI from August 2023 shows a cervical kyphosis with multilevel degenerative changes.  There is a large disc osteophyte complex at C6-7 resulting in fairly severe central stenosis.  When I last saw her in September 2023, she did not have any signs or symptoms of cervical myelopathy and her neck pain had improved with chiropractic therapy.  We had agreed to proceed with further conservative management.  At this time, she states that her neck pain is further improved.  She is finished with chiropractic therapy.  She does report some neck discomfort and stiffness, worst in the morning.  She denies any arm symptoms or any balance/gait dysfunction.    Patient's BMI is Body mass index is 41.15 kg/m².    14/14 systems reviewed and negative other than what is listed in the history of present illness    Patient Active Problem List   Diagnosis    Allergic conjunctivitis of both eyes    Alternating constipation and diarrhea    Azotemia    Chronic constipation    Debility    Depression with anxiety    Derangement of medial meniscus    Eczema    Essential hypertension    Fatigue    Flatulence/gas pain/belching    GERD (gastroesophageal reflux disease)    Hyperuricemia    Left knee DJD    Mixed hyperlipidemia    Moderate episode of recurrent major depressive disorder (CMS/HCC)    [place] on CPAP    Paresthesias    Paroxysmal cough    Otalgia of both ears    Degenerative cervical spinal stenosis    Morbid obesity with BMI of 40.0-44.9, adult (CMS/HCC)    Neck pain    Petechial rash    Polyarthralgia    Perforation of right tympanic membrane    Post-nasal drip    Postural dizziness with presyncope    Prediabetes    Pre-syncope    Recurrent  maxillary sinusitis    Seasonal allergic rhinitis    Unilateral headache    Vitamin D deficiency    Yeast vaginitis    Urge incontinence    Statin intolerance    Allergic rhinitis due to dust mite     No past medical history on file.  No past surgical history on file.  Social History     Tobacco Use    Smoking status: Never    Smokeless tobacco: Never   Substance Use Topics    Alcohol use: Not Currently     family history is not on file.    Current Outpatient Medications:     albuterol 90 mcg/actuation inhaler, Inhale 2 puffs every 4 hours if needed for shortness of breath or wheezing., Disp: 18 g, Rfl: 2    alendronate (Fosamax) 35 mg tablet, PLEASE SEE ATTACHED FOR DETAILED DIRECTIONS, Disp: 13 tablet, Rfl: 0    cholecalciferol (Vitamin D3) 1,250 mcg (50,000 unit) tablet, Please take 1 capsule by mouth Sundays with lunch and a full glass of water.  Thank you., Disp: 13 tablet, Rfl: 1    cyanocobalamin (Vitamin B-12) 1,000 mcg tablet, Take 1 tablet (1,000 mcg) by mouth once daily., Disp: , Rfl:     dilTIAZem ER (Tiazac) 120 mg 24 hr capsule, Take 1 capsule (120 mg) by mouth once daily., Disp: 90 capsule, Rfl: 1    fluticasone (Flonase) 50 mcg/actuation nasal spray, Use 2 sprays into each nostril after breakfast, and use 2 sprays into each nostril after supper.  Thank you., Disp: 16 mL, Rfl: 5    gabapentin (Neurontin) 100 mg capsule, TAKE 2 CAPSULES BY MOUTH AT 5 PM AND 2 CAPSULES BY MOUTH AT BEDTIME (Patient taking differently: Take 1 capsule (100 mg) by mouth 3 times a day. Take 2 capsules by mouth at 5 pm and 2 capsules by mouth at bedtime), Disp: 360 capsule, Rfl: 0    lisinopril 5 mg tablet, Please take 1 tablet by mouth with SUPPER every evening.  Thank you., Disp: 90 tablet, Rfl: 1    olopatadine (Patanol) 0.1 % ophthalmic solution, INSTILL 1 DROP INTO BOTH EYES TWICE DAILY AS NEEDED AT 6-8 HOUR INTERVALS., Disp: , Rfl:     ondansetron ODT (Zofran-ODT) 4 mg disintegrating tablet, PLEASE MELT 1 TABLET ON  TONGUE EVERY 6-8 HOURS AS NEEDED FOR NAUSEA. THANK YOU., Disp: 45 tablet, Rfl: 0    venlafaxine XR (Effexor-XR) 150 mg 24 hr capsule, Take 1 capsule (150 mg) by mouth once daily. With food, Disp: 90 capsule, Rfl: 0    famotidine (Pepcid) 20 mg tablet, Please take 1 tablet by mouth before breakfast time, and again 1 tablet by mouth before suppertime.  Please take twice a day, even if you are not going to eat.  Thank you. (Patient not taking: Reported on 3/7/2024), Disp: 180 tablet, Rfl: 1    fexofenadine (Allegra) 180 mg tablet, Take 1 tablet by mouth with breakfast, and again 1 tablet by mouth with supper for 5 days. Then decrease to taking 1 tablet by mouth with supper only, Disp: , Rfl:   Allergies   Allergen Reactions    Atorvastatin Other     myalgia    Codeine Unknown    Doxycycline Unknown    Montelukast Unknown    Penicillins Unknown       Physical Examination:  General: well developed, awake/alert/oriented x3, no distress, alert and cooperative  Head/Neck: neck Supple, no apparent injury  ENMT: mucous membranes moist, no apparent injury, no lesions seen  Cardiovascular: no pitting edema, no JVD  Respiratory/Thorax: Normal breath sounds with good chest expansion, thorax symmetric  Skin: warm and dry, no lesions, no rashes    Neurological/Musculoskeletal:    - Posture: normal coronal & sagittal alignment    - Range of motion: Decreased cervical range of motion, notably lateral rotation, right worse than left    - Muscle Bulk: normal and symmetric in all extremities    - Paraspinal muscle spasm/tenderness absent    - Motor Strength: 5/5 throughout all extremities    - Sensation: intact to light touch    - Reflexes: normal, negative Avila's sign      Results:  No new imaging to review    Assessment and Plan:  Cindy Calvillo is a 73 y.o. year old female who presents to the spine clinic in follow up for cervical stenosis.  I initially saw her in March 2023 for neck pain.  Her MRI from August 2023 shows a  cervical kyphosis with multilevel degenerative changes.  There is a large disc osteophyte complex at C6-7 resulting in fairly severe central stenosis.  When I last saw her in September 2023, she did not have any signs or symptoms of cervical myelopathy and her neck pain had improved with chiropractic therapy.  We had agreed to proceed with further conservative management.  At this time, she states that her neck pain is further improved.  She is finished with chiropractic therapy.  She does report some neck discomfort and stiffness, worst in the morning.  She denies any arm symptoms or any balance/gait dysfunction.  We have agreed to proceed with further observation.  I will plan to see her on an as-needed basis.  I did tell her that should she develop any worsening in neck pain, any arm symptoms, or any other symptoms of cervical myelopathy, that she should contact my office or present to an emergency department for evaluation.      I have reviewed all prior documentation and reviewed the electronic medical record since admission. I have personally have reviewed all advanced imaging not just the reports and used my interpretation as documented as the relevant findings. I have reviewed the risks and benefits of all treatment recommendations listed in this note with the patient and family. I spent a total of 12 minutes in service to this patient's care during this date of service.      The above clinical summary has been dictated with voice recognition software. It has not been proofread for grammatical errors, typographical mistakes, or other semantic inconsistencies.    Thank you for visiting our office today. It was our pleasure to take part in your healthcare.     Do not hesitate to call with any questions regarding your plan of care after leaving at [phone] M-F 8am-4pm.     To clinicians, thank you very much for this kind referral. It is a privilege to partner with you in the care of your patients. My  office would be delighted to assist you with any further consultations or with questions regarding the plan of care outlined. Do not hesitate to call the office or contact me directly.       Sincerely,      Jordan Marie MD PhD  Attending Neurosurgeon  [facility]   of Neurological Surgery  [facility] [employer]    [facility]  [address] [facility]  [address]  [place] [address]    [address]  [address]  [place] [address]    Office: [phone]  Fax: [phone]

## 2024-03-26 ENCOUNTER — OFFICE VISIT (OUTPATIENT)
Dept: PRIMARY CARE | Facility: CLINIC | Age: 74
End: 2024-03-26
Payer: COMMERCIAL

## 2024-03-26 VITALS
OXYGEN SATURATION: 97 % | HEIGHT: 62 IN | HEART RATE: 85 BPM | SYSTOLIC BLOOD PRESSURE: 93 MMHG | DIASTOLIC BLOOD PRESSURE: 59 MMHG | BODY MASS INDEX: 41.86 KG/M2 | WEIGHT: 227.5 LBS

## 2024-03-26 DIAGNOSIS — R06.09 DYSPNEA ON EXERTION: ICD-10-CM

## 2024-03-26 DIAGNOSIS — M48.02 DEGENERATIVE CERVICAL SPINAL STENOSIS: ICD-10-CM

## 2024-03-26 DIAGNOSIS — E55.9 VITAMIN D DEFICIENCY: ICD-10-CM

## 2024-03-26 DIAGNOSIS — J30.1 SEASONAL ALLERGIC RHINITIS DUE TO POLLEN: ICD-10-CM

## 2024-03-26 DIAGNOSIS — J98.01 BRONCHOSPASM: Primary | ICD-10-CM

## 2024-03-26 DIAGNOSIS — G47.33 OSA ON CPAP: ICD-10-CM

## 2024-03-26 DIAGNOSIS — E66.01 MORBID OBESITY WITH BMI OF 40.0-44.9, ADULT (MULTI): ICD-10-CM

## 2024-03-26 DIAGNOSIS — R53.83 FATIGUE, UNSPECIFIED TYPE: Primary | ICD-10-CM

## 2024-03-26 DIAGNOSIS — E53.8 VITAMIN B12 DEFICIENCY: ICD-10-CM

## 2024-03-26 PROCEDURE — 1036F TOBACCO NON-USER: CPT | Performed by: INTERNAL MEDICINE

## 2024-03-26 PROCEDURE — 3074F SYST BP LT 130 MM HG: CPT | Performed by: INTERNAL MEDICINE

## 2024-03-26 PROCEDURE — 3078F DIAST BP <80 MM HG: CPT | Performed by: INTERNAL MEDICINE

## 2024-03-26 PROCEDURE — 99213 OFFICE O/P EST LOW 20 MIN: CPT | Performed by: INTERNAL MEDICINE

## 2024-03-26 PROCEDURE — 1160F RVW MEDS BY RX/DR IN RCRD: CPT | Performed by: INTERNAL MEDICINE

## 2024-03-26 PROCEDURE — 1159F MED LIST DOCD IN RCRD: CPT | Performed by: INTERNAL MEDICINE

## 2024-03-26 PROCEDURE — 3008F BODY MASS INDEX DOCD: CPT | Performed by: INTERNAL MEDICINE

## 2024-03-26 RX ORDER — BISMUTH SUBSALICYLATE 262 MG
1 TABLET,CHEWABLE ORAL DAILY
COMMUNITY

## 2024-03-26 RX ORDER — GABAPENTIN 100 MG/1
CAPSULE ORAL
Qty: 360 CAPSULE | Refills: 0 | Status: SHIPPED | OUTPATIENT
Start: 2024-03-26 | End: 2024-04-25 | Stop reason: WASHOUT

## 2024-03-26 RX ORDER — BUTYROSPERMUM PARKII(SHEA BUTTER), SIMMONDSIA CHINENSIS (JOJOBA) SEED OIL, ALOE BARBADENSIS LEAF EXTRACT .01; 1; 3.5 G/100G; G/100G; G/100G
250 LIQUID TOPICAL 2 TIMES DAILY
COMMUNITY

## 2024-03-26 RX ORDER — ALBUTEROL SULFATE 90 UG/1
2 AEROSOL, METERED RESPIRATORY (INHALATION) EVERY 4 HOURS PRN
Qty: 18 G | Refills: 2 | Status: CANCELLED | OUTPATIENT
Start: 2024-03-26

## 2024-03-26 NOTE — PROGRESS NOTES
"Subjective   Patient ID: Cindy Calvillo is a 73 y.o. female who presents for Follow-up (Patient c/o feeling tired all the time.).    HPI   The patient is here earlier than expected.  States that she could not stand it, that she is tired all the time.  Perhaps even some degree of palpitations, but no lee substernal chest pain, no orthopnea, no paroxysmal nocturnal dyspnea.    States that she does not wake up refreshed all the time when using her CPAP machine.  States that she does have some shortness of breath with exertion from time to time.  Otherwise, no diaphoresis, no bipedal edema.  Compliant with medications, tolerating regimens.  Seen by CARDIOLOGY in the recent past, and was told that she was doing well.  2021 stress test revealing negative findings.    Seen by PSYCHIATRY, and she is wondering if she is lacking MOTIVATION.  Otherwise, no headache, blurred vision, diplopia.  No dysphagia.    ENDOCRINE with no polyuria, polydipsia, polyphagia.  No blurred vision.  No skin, hair, nail changes.  No dramatic weight loss or weight gain.  No dysuria, flank, suprapubic pain.  No skin changes, rashes, pruritus, jaundice.        Review of Systems  Review of systems as in history of present illness, and otherwise, reviewed separately as well, and was unremarkable/negative/noncontributory.        Objective   BP 93/59 (BP Location: Left arm, Patient Position: Sitting, BP Cuff Size: Adult)   Pulse 85   Ht 1.575 m (5' 2\")   Wt 103 kg (227 lb 8 oz)   SpO2 97%   BMI 41.61 kg/m²     Physical Exam  Disappointed, but otherwise, continues to want to improve quality of life, and does not wish harm to self or others.    HEAD pink palpebral conjunctivae, anicteric sclerae.  NECK supple, no apparent jugular venous distention.  CARDIOVASCULAR not in distress or diaphoresis.  No bipedal edema.  LUNGS not in distress or diaphoresis.  Not using accessory muscles.  ABDOMEN soft, nontender.  BACK no costovertebral angle " tenderness.  EXTREMITIES no clubbing, no cyanosis.  NEURO no facial asymmetry.  No apparent cranial nerve deficits.  Ambulating without need of assistance.  No apparent focal weakness.  No tremors.  PSYCH receptive, appropriate, and eager to maintain and improve quality of life.        LABORATORY results reviewed with patient.        Assessment/Plan   Diagnoses and all orders for this visit:  Fatigue, unspecified type  -     CBC and Auto Differential; Future  -     Comprehensive Metabolic Panel; Future  -     TSH with reflex to Free T4 if abnormal; Future  -     Magnesium; Future  -     Urinalysis with Reflex Culture and Microscopic; Future  -     Vitamin B12; Future  -     Vitamin D 25-Hydroxy,Total (for eval of Vitamin D levels); Future  -     Referral to Adult Sleep Medicine; Future  -     Complete Pulmonary Function Test Pre/Post Bronchodialator (Spirometry Pre/Post/DLCO/Lung Volumes); Future  Dyspnea on exertion  -     CBC and Auto Differential; Future  -     Referral to Adult Sleep Medicine; Future  -     Complete Pulmonary Function Test Pre/Post Bronchodialator (Spirometry Pre/Post/DLCO/Lung Volumes); Future  [place] on CPAP  -     CBC and Auto Differential; Future  -     TSH with reflex to Free T4 if abnormal; Future  -     Magnesium; Future  -     Referral to Adult Sleep Medicine; Future  -     Complete Pulmonary Function Test Pre/Post Bronchodialator (Spirometry Pre/Post/DLCO/Lung Volumes); Future  Seasonal allergic rhinitis due to pollen  -     CBC and Auto Differential; Future  -     Referral to Adult Sleep Medicine; Future  -     Complete Pulmonary Function Test Pre/Post Bronchodialator (Spirometry Pre/Post/DLCO/Lung Volumes); Future  Vitamin B12 deficiency  -     CBC and Auto Differential; Future  -     Vitamin B12; Future  Vitamin D deficiency  -     Comprehensive Metabolic Panel; Future  -     Vitamin D 25-Hydroxy,Total (for eval of Vitamin D levels); Future  Morbid obesity with BMI of 40.0-44.9, adult  (CMS/[facility])  -     CBC and Auto Differential; Future  -     Comprehensive Metabolic Panel; Future  -     TSH with reflex to Free T4 if abnormal; Future  -     Referral to Adult Sleep Medicine; Future       Thank you very much for coming.  I am glad to see you.    I am sorry to hear that you are still tired.  We have to make sure that this is not a problem with mood or MOTIVATION.  Please let your THERAPIST know of how you are feeling.    Your heart doctor saw you and thought that your heart was doing well.  You did have a STRESS TEST done in 2021, and that revealed negative findings.    Let us do some BLOOD and URINE examinations soon.  You can have this done at any  facility.    You will also benefit from a PULMONARY LUNG FUNCTION TEST to tell us whether or not this is a form of asthma that is causing you to be tired and short of breath.  In the meantime, your lungs are clear.    I do understand that you do not always feel adequately rested after using your CPAP machine.  You will also benefit from seeing a SLEEP SPECIALIST.    Please keep your next appointment.  Call sooner as needed.  I will call you with results and possible changes.  Please continue to take care of yourself and your family, and please continue to pray for our recovery from this pandemic.  I hope you have a happy Easter!  Please be careful when viewing the eclipse.  I am glad to hear that family members will be close at hand.    I see that you take GABAPENTIN 3 times a day.  Just take it at BEDTIME ONLY.  Thank you.            0  Preventive strategies to be continued as planned next month.  Patient could not wait until then because of excessive fatigue, as above.            0

## 2024-03-26 NOTE — PATIENT INSTRUCTIONS
Thank you very much for coming.  I am glad to see you.    I am sorry to hear that you are still tired.  We have to make sure that this is not a problem with mood or MOTIVATION.  Please let your THERAPIST know of how you are feeling.    Your heart doctor saw you and thought that your heart was doing well.  You did have a STRESS TEST done in 2021, and that revealed negative findings.    Let us do some BLOOD and URINE examinations soon.  You can have this done at any  facility.    You will also benefit from a PULMONARY LUNG FUNCTION TEST to tell us whether or not this is a form of asthma that is causing you to be tired and short of breath.  In the meantime, your lungs are clear.    I do understand that you do not always feel adequately rested after using your CPAP machine.  You will also benefit from seeing a SLEEP SPECIALIST.    Please keep your next appointment.  Call sooner as needed.  I will call you with results and possible changes.  Please continue to take care of yourself and your family, and please continue to pray for our recovery from this pandemic.  I hope you have a happy Easter!  Please be careful when viewing the eclipse.  I am glad to hear that family members will be close at hand.    I see that you take GABAPENTIN 3 times a day.  Just take it at BEDTIME ONLY.  Thank you.            0  Preventive strategies to be continued as planned next month.  Patient could not wait until then because of excessive fatigue, as above.                0

## 2024-04-03 RX ORDER — ALBUTEROL SULFATE 90 UG/1
2 AEROSOL, METERED RESPIRATORY (INHALATION) EVERY 4 HOURS PRN
Qty: 18 G | Refills: 2 | Status: SHIPPED | OUTPATIENT
Start: 2024-04-03

## 2024-04-07 DIAGNOSIS — M81.0 AGE-RELATED OSTEOPOROSIS WITHOUT CURRENT PATHOLOGICAL FRACTURE: ICD-10-CM

## 2024-04-09 RX ORDER — ALENDRONATE SODIUM 35 MG/1
TABLET ORAL
Qty: 12 TABLET | Refills: 0 | Status: SHIPPED | OUTPATIENT
Start: 2024-04-09

## 2024-04-16 ENCOUNTER — LAB (OUTPATIENT)
Dept: LAB | Facility: LAB | Age: 74
End: 2024-04-16
Payer: COMMERCIAL

## 2024-04-16 DIAGNOSIS — G47.33 OSA ON CPAP: ICD-10-CM

## 2024-04-16 DIAGNOSIS — R06.09 DYSPNEA ON EXERTION: ICD-10-CM

## 2024-04-16 DIAGNOSIS — J30.1 SEASONAL ALLERGIC RHINITIS DUE TO POLLEN: ICD-10-CM

## 2024-04-16 DIAGNOSIS — E53.8 VITAMIN B12 DEFICIENCY: ICD-10-CM

## 2024-04-16 DIAGNOSIS — E66.01 MORBID OBESITY WITH BMI OF 40.0-44.9, ADULT (MULTI): ICD-10-CM

## 2024-04-16 DIAGNOSIS — E55.9 VITAMIN D DEFICIENCY: ICD-10-CM

## 2024-04-16 DIAGNOSIS — R53.83 FATIGUE, UNSPECIFIED TYPE: ICD-10-CM

## 2024-04-16 LAB
25(OH)D3 SERPL-MCNC: 36 NG/ML (ref 30–100)
ALBUMIN SERPL BCP-MCNC: 4.1 G/DL (ref 3.4–5)
ALP SERPL-CCNC: 54 U/L (ref 33–136)
ALT SERPL W P-5'-P-CCNC: 23 U/L (ref 7–45)
ANION GAP SERPL CALC-SCNC: 13 MMOL/L (ref 10–20)
APPEARANCE UR: ABNORMAL
AST SERPL W P-5'-P-CCNC: 15 U/L (ref 9–39)
BASOPHILS # BLD AUTO: 0.07 X10*3/UL (ref 0–0.1)
BASOPHILS NFR BLD AUTO: 0.9 %
BILIRUB SERPL-MCNC: 1.1 MG/DL (ref 0–1.2)
BILIRUB UR STRIP.AUTO-MCNC: NEGATIVE MG/DL
BUN SERPL-MCNC: 17 MG/DL (ref 6–23)
CALCIUM SERPL-MCNC: 9 MG/DL (ref 8.6–10.3)
CHLORIDE SERPL-SCNC: 103 MMOL/L (ref 98–107)
CO2 SERPL-SCNC: 28 MMOL/L (ref 21–32)
COLOR UR: YELLOW
CREAT SERPL-MCNC: 0.72 MG/DL (ref 0.5–1.05)
EGFRCR SERPLBLD CKD-EPI 2021: 89 ML/MIN/1.73M*2
EOSINOPHIL # BLD AUTO: 0.13 X10*3/UL (ref 0–0.4)
EOSINOPHIL NFR BLD AUTO: 1.8 %
ERYTHROCYTE [DISTWIDTH] IN BLOOD BY AUTOMATED COUNT: 13.1 % (ref 11.5–14.5)
GLUCOSE SERPL-MCNC: 175 MG/DL (ref 74–99)
GLUCOSE UR STRIP.AUTO-MCNC: NEGATIVE MG/DL
HCT VFR BLD AUTO: 42.3 % (ref 36–46)
HGB BLD-MCNC: 14.1 G/DL (ref 12–16)
HOLD SPECIMEN: NORMAL
IMM GRANULOCYTES # BLD AUTO: 0.01 X10*3/UL (ref 0–0.5)
IMM GRANULOCYTES NFR BLD AUTO: 0.1 % (ref 0–0.9)
KETONES UR STRIP.AUTO-MCNC: NEGATIVE MG/DL
LEUKOCYTE ESTERASE UR QL STRIP.AUTO: ABNORMAL
LYMPHOCYTES # BLD AUTO: 1.85 X10*3/UL (ref 0.8–3)
LYMPHOCYTES NFR BLD AUTO: 25.1 %
MAGNESIUM SERPL-MCNC: 1.82 MG/DL (ref 1.6–2.4)
MCH RBC QN AUTO: 30.5 PG (ref 26–34)
MCHC RBC AUTO-ENTMCNC: 33.3 G/DL (ref 32–36)
MCV RBC AUTO: 91 FL (ref 80–100)
MONOCYTES # BLD AUTO: 0.39 X10*3/UL (ref 0.05–0.8)
MONOCYTES NFR BLD AUTO: 5.3 %
MUCOUS THREADS #/AREA URNS AUTO: ABNORMAL /LPF
NEUTROPHILS # BLD AUTO: 4.92 X10*3/UL (ref 1.6–5.5)
NEUTROPHILS NFR BLD AUTO: 66.8 %
NITRITE UR QL STRIP.AUTO: NEGATIVE
NRBC BLD-RTO: 0 /100 WBCS (ref 0–0)
PH UR STRIP.AUTO: 5 [PH]
PLATELET # BLD AUTO: 205 X10*3/UL (ref 150–450)
POTASSIUM SERPL-SCNC: 4 MMOL/L (ref 3.5–5.3)
PROT SERPL-MCNC: 6.9 G/DL (ref 6.4–8.2)
PROT UR STRIP.AUTO-MCNC: NEGATIVE MG/DL
RBC # BLD AUTO: 4.63 X10*6/UL (ref 4–5.2)
RBC # UR STRIP.AUTO: NEGATIVE /UL
RBC #/AREA URNS AUTO: ABNORMAL /HPF
SODIUM SERPL-SCNC: 140 MMOL/L (ref 136–145)
SP GR UR STRIP.AUTO: 1.02
SQUAMOUS #/AREA URNS AUTO: ABNORMAL /HPF
TSH SERPL-ACNC: 1.3 MIU/L (ref 0.44–3.98)
UROBILINOGEN UR STRIP.AUTO-MCNC: <2 MG/DL
VIT B12 SERPL-MCNC: 1837 PG/ML (ref 211–911)
WBC # BLD AUTO: 7.4 X10*3/UL (ref 4.4–11.3)
WBC #/AREA URNS AUTO: ABNORMAL /HPF

## 2024-04-16 PROCEDURE — 80053 COMPREHEN METABOLIC PANEL: CPT

## 2024-04-16 PROCEDURE — 36415 COLL VENOUS BLD VENIPUNCTURE: CPT

## 2024-04-16 PROCEDURE — 84443 ASSAY THYROID STIM HORMONE: CPT

## 2024-04-16 PROCEDURE — 85025 COMPLETE CBC W/AUTO DIFF WBC: CPT

## 2024-04-16 PROCEDURE — 83735 ASSAY OF MAGNESIUM: CPT

## 2024-04-16 PROCEDURE — 87086 URINE CULTURE/COLONY COUNT: CPT

## 2024-04-16 PROCEDURE — 81001 URINALYSIS AUTO W/SCOPE: CPT

## 2024-04-16 PROCEDURE — 82306 VITAMIN D 25 HYDROXY: CPT

## 2024-04-16 PROCEDURE — 82607 VITAMIN B-12: CPT

## 2024-04-17 LAB — BACTERIA UR CULT: NORMAL

## 2024-04-25 ENCOUNTER — OFFICE VISIT (OUTPATIENT)
Dept: BEHAVIORAL HEALTH | Facility: CLINIC | Age: 74
End: 2024-04-25
Payer: COMMERCIAL

## 2024-04-25 ENCOUNTER — OFFICE VISIT (OUTPATIENT)
Dept: PRIMARY CARE | Facility: CLINIC | Age: 74
End: 2024-04-25
Payer: COMMERCIAL

## 2024-04-25 VITALS
DIASTOLIC BLOOD PRESSURE: 78 MMHG | SYSTOLIC BLOOD PRESSURE: 155 MMHG | WEIGHT: 219 LBS | HEART RATE: 84 BPM | HEIGHT: 62 IN | BODY MASS INDEX: 40.3 KG/M2

## 2024-04-25 VITALS
HEART RATE: 90 BPM | DIASTOLIC BLOOD PRESSURE: 66 MMHG | SYSTOLIC BLOOD PRESSURE: 128 MMHG | BODY MASS INDEX: 41.41 KG/M2 | HEIGHT: 62 IN | WEIGHT: 225 LBS | OXYGEN SATURATION: 97 %

## 2024-04-25 DIAGNOSIS — E79.0 HYPERURICEMIA: ICD-10-CM

## 2024-04-25 DIAGNOSIS — J30.1 SEASONAL ALLERGIC RHINITIS DUE TO POLLEN: ICD-10-CM

## 2024-04-25 DIAGNOSIS — G47.33 OSA ON CPAP: ICD-10-CM

## 2024-04-25 DIAGNOSIS — K21.9 GASTROESOPHAGEAL REFLUX DISEASE, UNSPECIFIED WHETHER ESOPHAGITIS PRESENT: ICD-10-CM

## 2024-04-25 DIAGNOSIS — R53.83 FATIGUE, UNSPECIFIED TYPE: ICD-10-CM

## 2024-04-25 DIAGNOSIS — F41.8 DEPRESSION WITH ANXIETY: ICD-10-CM

## 2024-04-25 DIAGNOSIS — E55.9 VITAMIN D DEFICIENCY: ICD-10-CM

## 2024-04-25 DIAGNOSIS — F33.1 MODERATE EPISODE OF RECURRENT MAJOR DEPRESSIVE DISORDER (MULTI): ICD-10-CM

## 2024-04-25 DIAGNOSIS — E66.01 MORBID OBESITY WITH BMI OF 40.0-44.9, ADULT (MULTI): ICD-10-CM

## 2024-04-25 DIAGNOSIS — Z00.00 ROUTINE GENERAL MEDICAL EXAMINATION AT HEALTH CARE FACILITY: Primary | ICD-10-CM

## 2024-04-25 DIAGNOSIS — N39.41 URGE INCONTINENCE: ICD-10-CM

## 2024-04-25 DIAGNOSIS — E78.2 MIXED HYPERLIPIDEMIA: ICD-10-CM

## 2024-04-25 DIAGNOSIS — R73.03 PREDIABETES: ICD-10-CM

## 2024-04-25 DIAGNOSIS — I10 ESSENTIAL HYPERTENSION: ICD-10-CM

## 2024-04-25 PROBLEM — M81.0 AGE-RELATED OSTEOPOROSIS WITHOUT CURRENT PATHOLOGICAL FRACTURE: Status: ACTIVE | Noted: 2024-04-25

## 2024-04-25 PROCEDURE — 1160F RVW MEDS BY RX/DR IN RCRD: CPT | Performed by: INTERNAL MEDICINE

## 2024-04-25 PROCEDURE — 3008F BODY MASS INDEX DOCD: CPT | Performed by: PSYCHIATRY & NEUROLOGY

## 2024-04-25 PROCEDURE — 99214 OFFICE O/P EST MOD 30 MIN: CPT | Performed by: PSYCHIATRY & NEUROLOGY

## 2024-04-25 PROCEDURE — 1159F MED LIST DOCD IN RCRD: CPT | Performed by: PSYCHIATRY & NEUROLOGY

## 2024-04-25 PROCEDURE — G0438 PPPS, INITIAL VISIT: HCPCS | Performed by: INTERNAL MEDICINE

## 2024-04-25 PROCEDURE — 3008F BODY MASS INDEX DOCD: CPT | Performed by: INTERNAL MEDICINE

## 2024-04-25 PROCEDURE — 3074F SYST BP LT 130 MM HG: CPT | Performed by: INTERNAL MEDICINE

## 2024-04-25 PROCEDURE — 1170F FXNL STATUS ASSESSED: CPT | Performed by: INTERNAL MEDICINE

## 2024-04-25 PROCEDURE — 3077F SYST BP >= 140 MM HG: CPT | Performed by: PSYCHIATRY & NEUROLOGY

## 2024-04-25 PROCEDURE — 99213 OFFICE O/P EST LOW 20 MIN: CPT | Performed by: INTERNAL MEDICINE

## 2024-04-25 PROCEDURE — 1123F ACP DISCUSS/DSCN MKR DOCD: CPT | Performed by: INTERNAL MEDICINE

## 2024-04-25 PROCEDURE — 1160F RVW MEDS BY RX/DR IN RCRD: CPT | Performed by: PSYCHIATRY & NEUROLOGY

## 2024-04-25 PROCEDURE — 1036F TOBACCO NON-USER: CPT | Performed by: INTERNAL MEDICINE

## 2024-04-25 PROCEDURE — 3078F DIAST BP <80 MM HG: CPT | Performed by: INTERNAL MEDICINE

## 2024-04-25 PROCEDURE — 3078F DIAST BP <80 MM HG: CPT | Performed by: PSYCHIATRY & NEUROLOGY

## 2024-04-25 PROCEDURE — 1159F MED LIST DOCD IN RCRD: CPT | Performed by: INTERNAL MEDICINE

## 2024-04-25 RX ORDER — VENLAFAXINE HYDROCHLORIDE 150 MG/1
150 CAPSULE, EXTENDED RELEASE ORAL DAILY
Qty: 30 CAPSULE | Refills: 5 | Status: SHIPPED | OUTPATIENT
Start: 2024-04-25 | End: 2024-10-22

## 2024-04-25 RX ORDER — VENLAFAXINE HYDROCHLORIDE 37.5 MG/1
37.5 CAPSULE, EXTENDED RELEASE ORAL DAILY
Qty: 30 CAPSULE | Refills: 11 | Status: SHIPPED | OUTPATIENT
Start: 2024-04-25 | End: 2025-04-25

## 2024-04-25 ASSESSMENT — ACTIVITIES OF DAILY LIVING (ADL)
DRESSING: INDEPENDENT
GROCERY_SHOPPING: INDEPENDENT
TAKING_MEDICATION: INDEPENDENT
MANAGING_FINANCES: INDEPENDENT
DOING_HOUSEWORK: INDEPENDENT
BATHING: INDEPENDENT

## 2024-04-25 ASSESSMENT — ENCOUNTER SYMPTOMS
LOSS OF SENSATION IN FEET: 0
FATIGUE: 1
DYSPHORIC MOOD: 1
DEPRESSION: 0
OCCASIONAL FEELINGS OF UNSTEADINESS: 0

## 2024-04-25 ASSESSMENT — PATIENT HEALTH QUESTIONNAIRE - PHQ9
1. LITTLE INTEREST OR PLEASURE IN DOING THINGS: SEVERAL DAYS
SUM OF ALL RESPONSES TO PHQ9 QUESTIONS 1 AND 2: 2
2. FEELING DOWN, DEPRESSED OR HOPELESS: SEVERAL DAYS
10. IF YOU CHECKED OFF ANY PROBLEMS, HOW DIFFICULT HAVE THESE PROBLEMS MADE IT FOR YOU TO DO YOUR WORK, TAKE CARE OF THINGS AT HOME, OR GET ALONG WITH OTHER PEOPLE: NOT DIFFICULT AT ALL

## 2024-04-25 NOTE — PROGRESS NOTES
"Subjective   Reason for Visit: Cindy Calvillo is an 74 y.o. female here for a Medicare Wellness visit.     Past Medical, Surgical, and Family History reviewed and updated in chart.    Reviewed all medications by prescribing practitioner or clinical pharmacist (such as prescriptions, OTCs, herbal therapies and supplements) and documented in the medical record.    HPI  Improvement of fatigue noted when patient is able to \"push myself\" as her psychiatrist recommended.  She watched the play, and enjoyed herself, and did not find yourself tired.  She was even afraid of falling asleep during the play, but this did not happen.  She finished the day in a very happy mood, and fell asleep comfortably, and woke up refreshed.  She realized that this is probably what she needed to do.    She is also planning to step up her exercise routine.  She does have some rare urgency, but no dysuria, flank, suprapubic pain.  No recent falls.  Continues to want to improve quality of life, does not wish harm to self or others.  No headache, blurred vision, diplopia, no dysphagia.    Chief complaint perhaps remains fatigue, with the patient in the meantime trying her best to manage her CPAP use.  Does have upper respiratory symptoms related to ALLERGIES, but no colored phlegm.  Plans to make some necessary adjustments, and open to suggestions.  CONSTITUTIONALLY, no fever, no chills.  No night sweats.  No lingering anorexia or nausea.  No apparent lymphadenopathy.  No apparent weight loss.    Otherwise, no lee substernal chest pain, no orthopnea, no paroxysmal nocturnal dyspnea.  Plans to follow-up with GI for history of bloating, and need for screening COLONOSCOPY.  No skin changes, rashes, pruritus, jaundice.      Medicare Wellness visit done today, including CODE STATUS review, living will wishes.  Please see.    Patient Care Team:  Chilo Graves MD as PCP - General  Chilo Graves MD as PCP - [facility] Medicare " "Advantage PCP         Review of Systems  Review of systems as in history of present illness, and otherwise, reviewed separately as well, and was unremarkable/negative/noncontributory.        Objective   Vitals:  /66 (BP Location: Left arm, Patient Position: Sitting, BP Cuff Size: Adult)   Pulse 90   Ht 1.575 m (5' 2\")   Wt 102 kg (225 lb)   SpO2 97%   BMI 41.15 kg/m²       Physical Exam  Mildly anxious, but otherwise, continues to want to improve quality of life, and does not wish harm to self or others.  Appropriate.  Eager to get better.  Obese build, but completely independent.  Respectfully refusing bariatrics at this time.    HEAD pink palpebral conjunctivae, anicteric sclerae.  NECK supple, no apparent jugular venous distention.  CARDIOVASCULAR not in distress or diaphoresis.  No bipedal edema.  LUNGS not in distress or diaphoresis.  Not using accessory muscles.  ABDOMEN soft, nontender.  BACK no costovertebral angle tenderness.  EXTREMITIES no clubbing, no cyanosis.  NEURO no facial asymmetry.  No apparent cranial nerve deficits.  Romberg negative.  Ambulating without need of assistance.  No apparent focal weakness.  No tremors.  PSYCH receptive, appropriate, and eager to maintain and improve quality of life.        LABORATORY results reviewed with patient.        Assessment/Plan   Cindy was seen today for medicare annual wellness visit subsequent.  Diagnoses and all orders for this visit:  Routine general medical examination at health care facility (Primary)  Fatigue, unspecified type  -     Follow Up In Primary Care - Established  -     Follow Up In Primary Care - Established  -     Follow Up In Primary Care - Established; Future  -     Comprehensive Metabolic Panel; Future  -     TSH with reflex to Free T4 if abnormal; Future  [place] on CPAP  -     Follow Up In Primary Care - Established; Future  -     Comprehensive Metabolic Panel; Future  -     TSH with reflex to Free T4 if abnormal; " Future  Seasonal allergic rhinitis due to pollen  -     Follow Up In Primary Care - Established; Future  Gastroesophageal reflux disease, unspecified whether esophagitis present  -     Follow Up In Primary Care - Established; Future  Essential hypertension  -     Follow Up In Primary Care - Established; Future  -     Comprehensive Metabolic Panel; Future  -     TSH with reflex to Free T4 if abnormal; Future  Prediabetes  -     Follow Up In Primary Care - Established; Future  -     Comprehensive Metabolic Panel; Future  -     TSH with reflex to Free T4 if abnormal; Future  -     Hemoglobin A1C; Future  Mixed hyperlipidemia  -     Follow Up In Primary Care - Established; Future  -     Comprehensive Metabolic Panel; Future  -     TSH with reflex to Free T4 if abnormal; Future  -     Lipid Panel; Future  Morbid obesity with BMI of 40.0-44.9, adult (Multi)  -     Follow Up In Primary Care - Established; Future  -     Comprehensive Metabolic Panel; Future  -     TSH with reflex to Free T4 if abnormal; Future  Vitamin D deficiency  -     Follow Up In Primary Care - Established; Future  -     Comprehensive Metabolic Panel; Future  -     TSH with reflex to Free T4 if abnormal; Future  Hyperuricemia  -     Follow Up In Primary Care - Established; Future  -     Comprehensive Metabolic Panel; Future  -     TSH with reflex to Free T4 if abnormal; Future  Moderate episode of recurrent major depressive disorder (Multi)  -     Follow Up In Primary Care - Established; Future  -     Comprehensive Metabolic Panel; Future  -     TSH with reflex to Free T4 if abnormal; Future  Urge incontinence  -     Follow Up In Primary Care - Established; Future      Thank you very much for coming.  I am very happy to see you.    We did your Medicare Wellness evaluation today, and I am glad to say that physically, you are doing relatively okay.    I am also glad to hear that through the help of your PSYCHIATRIST, you have found that if you put  yourself, you can have a good time!    With your FATIGUE, please get in touch with your CPAP supplier, and let them know what you are feeling.  Make sure that you are getting the best out of your CPAP.  Thank you for using it.    Also, let us see what you can do regarding your ALLERGIES.  XYZAL is the medication you are describing.  Take once a day with supper for at least 1 week, and see if this controls your symptoms.  If not, try and see if you can tolerate it twice a day, breakfast and supper, for at least 1 week.  If not effective, let me know.    Xyzal will also work better if you are using the FLUTICASONE/Flonase nasal spray.  1 spray to each nostril after breakfast, and 1 spray to each nostril after supper.  Use even if you are not going to eat.  Lots of fluids throughout the day please.    Call me with fever 100.5 or higher, or colored phlegm.    Please get in touch with GI and get your COLONOSCOPY scheduled.  Also, you will be able to ask for advice regarding your bloating and history of diarrhea.  Very important.    Please come back in 2 months.  You are going to be due for FASTING laboratory examinations a few days prior.  To have them done at any  facility a few days before your next appointment.    We reviewed your living will wishes and your CODE STATUS.  We will keep your SON as your DURABLE POWER OF  for healthcare matters.  This means that if you are unable to express your wishes, he will be the person who will be speaking on your behalf.    With your CODE STATUS FULL CODE, this means that we will do everything to save your life and to preserve it.  Afterwards, when you are stable, we will discuss with you and your son how you are doing and what your options are.  At the very least, we will try our best to keep you comfortable and to maintain your quality of life.    Again, thank you very much for coming.  Please do not hesitate to call with questions or concerns.  Please continue to take  care of yourself and each other, and your son's dog!  Please continue to pray for our recovery from this pandemic.  I hope you had a good Easter Sunday, and I do wish you a happy summer!            0  Return in 2 months.  40 minutes please.  FASTING laboratory examinations to be done at any  facility, then see me for COMPLETE physical examination.  Reassess fatigue, CPAP use, upper respiratory symptoms.  Coordinate with GI, history of bloating.  Coordinate with psychiatry, mood, energy, function.  Offer bariatrics once more, or consider phentermine/Adipex if appropriate.            0

## 2024-04-25 NOTE — PATIENT INSTRUCTIONS
Thank you very much for coming.  I am very happy to see you.    We did your Medicare Wellness evaluation today, and I am glad to say that physically, you are doing relatively okay.    I am also glad to hear that through the help of your PSYCHIATRIST, you have found that if you put yourself, you can have a good time!    With your FATIGUE, please get in touch with your CPAP supplier, and let them know what you are feeling.  Make sure that you are getting the best out of your CPAP.  Thank you for using it.    Also, let us see what you can do regarding your ALLERGIES.  XYZAL is the medication you are describing.  Take once a day with supper for at least 1 week, and see if this controls your symptoms.  If not, try and see if you can tolerate it twice a day, breakfast and supper, for at least 1 week.  If not effective, let me know.    Xyzal will also work better if you are using the FLUTICASONE/Flonase nasal spray.  1 spray to each nostril after breakfast, and 1 spray to each nostril after supper.  Use even if you are not going to eat.  Lots of fluids throughout the day please.    Call me with fever 100.5 or higher, or colored phlegm.    Please get in touch with GI and get your COLONOSCOPY scheduled.  Also, you will be able to ask for advice regarding your bloating and history of diarrhea.  Very important.    Please come back in 2 months.  You are going to be due for FASTING laboratory examinations a few days prior.  To have them done at any  facility a few days before your next appointment.    We reviewed your living will wishes and your CODE STATUS.  We will keep your SON as your DURABLE POWER OF  for healthcare matters.  This means that if you are unable to express your wishes, he will be the person who will be speaking on your behalf.    With your CODE STATUS FULL CODE, this means that we will do everything to save your life and to preserve it.  Afterwards, when you are stable, we will discuss with you and  your son how you are doing and what your options are.  At the very least, we will try our best to keep you comfortable and to maintain your quality of life.    Again, thank you very much for coming.  Please do not hesitate to call with questions or concerns.  Please continue to take care of yourself and each other, and your son's dog!  Please continue to pray for our recovery from this pandemic.  I hope you had a good Easter Sunday, and I do wish you a happy summer!            0  Return in 2 months.  40 minutes please.  FASTING laboratory examinations to be done at any  facility, then see me for COMPLETE physical examination.  Reassess fatigue, CPAP use, upper respiratory symptoms.  Coordinate with GI, history of bloating.  Coordinate with psychiatry, mood, energy, function.  Offer bariatrics once more, or consider phentermine/Adipex if appropriate.            0

## 2024-04-25 NOTE — PROGRESS NOTES
Subjective   Patient ID: Cindy Calvillo is a 74 y.o. female who presents for No chief complaint on file.. I am doing alright.    HPI: The patient states that she has no energy or motivation. The patient is at home with her son with whom she has a conflictual relationship. The patient at this time as no interests and continues to withdrawn and spend most of her time sleeping.     MSE: The patient is alert, fully oriented, language is intact, and recent and remote memory intact. The patient denies any suicidal or homicidal ideation or plans. The patient presents with anxious and depressive features without manic or psychotic symptoms. Thought is logical and clear. Judgment and insight are limited. The patient has had more vegetative features.         Review of Systems   Constitutional:  Positive for fatigue.   Neurological:         MSE: The patient is alert, fully oriented, language is intact, and recent and remote memory intact. The patient denies any suicidal or homicidal ideation or plans. The patient presents with anxious and depressive features without manic or psychotic symptoms. Thought is logical and clear. Judgment and insight are limited. The patient has had more vegetative features.      Psychiatric/Behavioral:  Positive for behavioral problems and dysphoric mood.         MSE: The patient is alert, fully oriented, language is intact, and recent and remote memory intact. The patient denies any suicidal or homicidal ideation or plans. The patient presents with anxious and depressive features without manic or psychotic symptoms. Thought is logical and clear. Judgment and insight are limited. The patient has had more vegetative features.    All other systems reviewed and are negative.      Objective   Physical Exam  Constitutional:       Appearance: Normal appearance.   Neurological:      Mental Status: She is alert.      Comments: MSE: The patient is alert, fully oriented, language is intact, and recent and  remote memory intact. The patient denies any suicidal or homicidal ideation or plans. The patient presents with anxious and depressive features without manic or psychotic symptoms. Thought is logical and clear. Judgment and insight are limited. The patient has had more vegetative features.      Psychiatric:         Mood and Affect: Mood normal.      Comments: The patient is alert, fully oriented, language is intact, and recent and remote memory intact. The patient denies any suicidal or homicidal ideation or plans. The patient presents with no depressive, manic or psychotic symptoms. Thought is logical and clear. No disturbances of judgment or insight are exhibited. No behavioral disturbances are present on examination.           Lab Review:       Assessment/Plan   The risks, benefits & alternatives to the medications prescribed were explained to you today. You were able to understand & repeat these risks, benefits & alternatives to this prescribed medication. You have agreed to proceed with treatment with the medications discussed based on the conclusion that the benefit outweighs the risks of this treatment regimen: increase venlafaxine to 187.5 mg ER daily.     Follow up in 2 to 3 weeks.        Psych Review of Symptoms:    ADHD: Patient denied any symptoms.         Anxiety: Patient denied any symptoms.         Developmental and Sensory Concerns: Patient denied any symptoms.         Depressive Symptoms:   Decreased interest, fatigue and withdrawal/isolation.       Disruptive and Conduct Symptoms: Patient denied any symptoms.         Eating / Feeding Concerns: Patient denied any symptoms.         Elimination Symptoms: Patient denied any symptoms.         Manic Symptoms: Patient denied any symptoms.         Obsessive-Compulsive Symptoms: Patient denied any symptoms.         Psychotic Symptoms: Patient denied any symptoms.           Trauma Related Symptoms: Patient denied any symptoms.           Sleep Concerns:  Patient denied any symptoms.

## 2024-05-01 ENCOUNTER — HOSPITAL ENCOUNTER (OUTPATIENT)
Dept: RESPIRATORY THERAPY | Facility: HOSPITAL | Age: 74
Discharge: HOME | End: 2024-05-01
Payer: COMMERCIAL

## 2024-05-01 DIAGNOSIS — G47.33 OSA ON CPAP: ICD-10-CM

## 2024-05-01 DIAGNOSIS — R53.83 FATIGUE, UNSPECIFIED TYPE: ICD-10-CM

## 2024-05-01 DIAGNOSIS — J30.1 SEASONAL ALLERGIC RHINITIS DUE TO POLLEN: ICD-10-CM

## 2024-05-01 DIAGNOSIS — R06.09 DYSPNEA ON EXERTION: ICD-10-CM

## 2024-05-01 LAB
MGC ASCENT PFT - FEV1 - PRE: 1.86
MGC ASCENT PFT - FEV1 - PREDICTED: 1.95
MGC ASCENT PFT - FVC - PRE: 2.21
MGC ASCENT PFT - FVC - PREDICTED: 2.49

## 2024-05-01 PROCEDURE — 94726 PLETHYSMOGRAPHY LUNG VOLUMES: CPT

## 2024-05-23 ENCOUNTER — APPOINTMENT (OUTPATIENT)
Dept: BEHAVIORAL HEALTH | Facility: CLINIC | Age: 74
End: 2024-05-23
Payer: COMMERCIAL

## 2024-05-30 ENCOUNTER — OFFICE VISIT (OUTPATIENT)
Dept: BEHAVIORAL HEALTH | Facility: CLINIC | Age: 74
End: 2024-05-30
Payer: COMMERCIAL

## 2024-05-30 VITALS
SYSTOLIC BLOOD PRESSURE: 139 MMHG | BODY MASS INDEX: 40.48 KG/M2 | HEIGHT: 62 IN | DIASTOLIC BLOOD PRESSURE: 83 MMHG | WEIGHT: 220 LBS | HEART RATE: 72 BPM

## 2024-05-30 DIAGNOSIS — F41.8 DEPRESSION WITH ANXIETY: ICD-10-CM

## 2024-05-30 PROCEDURE — 1159F MED LIST DOCD IN RCRD: CPT | Performed by: PSYCHIATRY & NEUROLOGY

## 2024-05-30 PROCEDURE — 99213 OFFICE O/P EST LOW 20 MIN: CPT | Performed by: PSYCHIATRY & NEUROLOGY

## 2024-05-30 PROCEDURE — 1160F RVW MEDS BY RX/DR IN RCRD: CPT | Performed by: PSYCHIATRY & NEUROLOGY

## 2024-05-30 PROCEDURE — 3075F SYST BP GE 130 - 139MM HG: CPT | Performed by: PSYCHIATRY & NEUROLOGY

## 2024-05-30 PROCEDURE — 1123F ACP DISCUSS/DSCN MKR DOCD: CPT | Performed by: PSYCHIATRY & NEUROLOGY

## 2024-05-30 PROCEDURE — 3079F DIAST BP 80-89 MM HG: CPT | Performed by: PSYCHIATRY & NEUROLOGY

## 2024-05-30 PROCEDURE — 3008F BODY MASS INDEX DOCD: CPT | Performed by: PSYCHIATRY & NEUROLOGY

## 2024-05-30 ASSESSMENT — ENCOUNTER SYMPTOMS: DYSPHORIC MOOD: 1

## 2024-05-30 NOTE — PROGRESS NOTES
Subjective   Patient ID: Cindy Calvillo is a 74 y.o. female who presents for No chief complaint on file.. I am doing much better.     HPI: The patient states that she is much improved. The patient is at home with her son with whom she has a conflictual relationship. However, her relationship with her son is better now. The patient at this time is doing much better socializing and being active.    MSE: The patient is alert, fully oriented, language is intact, and recent and remote memory intact. The patient denies any suicidal or homicidal ideation or plans. The patient presents with anxious and depressive features without manic or psychotic symptoms. Thought is logical and clear. Judgment and insight are limited. The patient has had more vegetative features.         Review of Systems   Neurological:         MSE: The patient is alert, fully oriented, language is intact, and recent and remote memory intact. The patient denies any suicidal or homicidal ideation or plans. The patient presents with anxious and depressive features without manic or psychotic symptoms. Thought is logical and clear. Judgment and insight are limited. The patient has had more vegetative features.      Psychiatric/Behavioral:  Positive for behavioral problems and dysphoric mood.         MSE: The patient is alert, fully oriented, language is intact, and recent and remote memory intact. The patient denies any suicidal or homicidal ideation or plans. The patient presents with anxious and depressive features without manic or psychotic symptoms. Thought is logical and clear. Judgment and insight are limited. The patient has had more vegetative features.    All other systems reviewed and are negative.      Objective   Physical Exam  Constitutional:       Appearance: Normal appearance.   Neurological:      Mental Status: She is alert.      Comments: MSE: The patient is alert, fully oriented, language is intact, and recent and remote memory intact.  The patient denies any suicidal or homicidal ideation or plans. The patient presents with anxious and depressive features without manic or psychotic symptoms. Thought is logical and clear. Judgment and insight are limited. The patient has had more vegetative features.      Psychiatric:         Mood and Affect: Mood normal.      Comments: The patient is alert, fully oriented, language is intact, and recent and remote memory intact. The patient denies any suicidal or homicidal ideation or plans. The patient presents with no depressive, manic or psychotic symptoms. Thought is logical and clear. No disturbances of judgment or insight are exhibited. No behavioral disturbances are present on examination.           Lab Review:       Assessment/Plan   The risks, benefits & alternatives to the medications prescribed were explained to you today. You were able to understand & repeat these risks, benefits & alternatives to this prescribed medication. You have agreed to proceed with treatment with the medications discussed based on the conclusion that the benefit outweighs the risks of this treatment regimen:  venlafaxine to 187.5 mg ER daily.     Follow up in November of 2024.        Psych Review of Symptoms:    ADHD: Patient denied any symptoms.         Anxiety: Patient denied any symptoms.         Developmental and Sensory Concerns: Patient denied any symptoms.         Depressive Symptoms: Patient denied any symptoms.         Disruptive and Conduct Symptoms: Patient denied any symptoms.         Eating / Feeding Concerns: Patient denied any symptoms.         Elimination Symptoms: Patient denied any symptoms.         Manic Symptoms: Patient denied any symptoms.         Obsessive-Compulsive Symptoms: Patient denied any symptoms.         Psychotic Symptoms: Patient denied any symptoms.           Trauma Related Symptoms: Patient denied any symptoms.           Sleep Concerns: Patient denied any symptoms.

## 2024-07-01 DIAGNOSIS — M81.0 AGE-RELATED OSTEOPOROSIS WITHOUT CURRENT PATHOLOGICAL FRACTURE: ICD-10-CM

## 2024-07-02 ENCOUNTER — APPOINTMENT (OUTPATIENT)
Dept: PRIMARY CARE | Facility: CLINIC | Age: 74
End: 2024-07-02
Payer: COMMERCIAL

## 2024-07-02 RX ORDER — ALENDRONATE SODIUM 35 MG/1
TABLET ORAL
Qty: 13 TABLET | Refills: 0 | Status: SHIPPED | OUTPATIENT
Start: 2024-07-02

## 2024-07-17 ENCOUNTER — OFFICE VISIT (OUTPATIENT)
Dept: PRIMARY CARE | Facility: CLINIC | Age: 74
End: 2024-07-17
Payer: COMMERCIAL

## 2024-07-17 VITALS
DIASTOLIC BLOOD PRESSURE: 75 MMHG | SYSTOLIC BLOOD PRESSURE: 143 MMHG | BODY MASS INDEX: 42.31 KG/M2 | OXYGEN SATURATION: 97 % | HEIGHT: 62 IN | HEART RATE: 74 BPM | WEIGHT: 229.9 LBS

## 2024-07-17 DIAGNOSIS — G47.33 OBSTRUCTIVE SLEEP APNEA ON CPAP: ICD-10-CM

## 2024-07-17 DIAGNOSIS — R73.03 PREDIABETES: ICD-10-CM

## 2024-07-17 DIAGNOSIS — F33.1 MODERATE EPISODE OF RECURRENT MAJOR DEPRESSIVE DISORDER (MULTI): ICD-10-CM

## 2024-07-17 DIAGNOSIS — J30.1 SEASONAL ALLERGIC RHINITIS DUE TO POLLEN: Primary | ICD-10-CM

## 2024-07-17 DIAGNOSIS — E66.01 MORBID OBESITY WITH BMI OF 40.0-44.9, ADULT (MULTI): ICD-10-CM

## 2024-07-17 DIAGNOSIS — I10 ESSENTIAL HYPERTENSION: ICD-10-CM

## 2024-07-17 DIAGNOSIS — E78.2 MIXED HYPERLIPIDEMIA: ICD-10-CM

## 2024-07-17 PROCEDURE — 1160F RVW MEDS BY RX/DR IN RCRD: CPT | Performed by: INTERNAL MEDICINE

## 2024-07-17 PROCEDURE — 1159F MED LIST DOCD IN RCRD: CPT | Performed by: INTERNAL MEDICINE

## 2024-07-17 PROCEDURE — 1036F TOBACCO NON-USER: CPT | Performed by: INTERNAL MEDICINE

## 2024-07-17 PROCEDURE — 99213 OFFICE O/P EST LOW 20 MIN: CPT | Performed by: INTERNAL MEDICINE

## 2024-07-17 PROCEDURE — 3008F BODY MASS INDEX DOCD: CPT | Performed by: INTERNAL MEDICINE

## 2024-07-17 PROCEDURE — 90677 PCV20 VACCINE IM: CPT | Performed by: INTERNAL MEDICINE

## 2024-07-17 PROCEDURE — 1123F ACP DISCUSS/DSCN MKR DOCD: CPT | Performed by: INTERNAL MEDICINE

## 2024-07-17 PROCEDURE — G0009 ADMIN PNEUMOCOCCAL VACCINE: HCPCS | Performed by: INTERNAL MEDICINE

## 2024-07-17 PROCEDURE — 3077F SYST BP >= 140 MM HG: CPT | Performed by: INTERNAL MEDICINE

## 2024-07-17 PROCEDURE — 3078F DIAST BP <80 MM HG: CPT | Performed by: INTERNAL MEDICINE

## 2024-07-17 RX ORDER — CETIRIZINE HYDROCHLORIDE 10 MG/1
10 TABLET ORAL DAILY
COMMUNITY

## 2024-07-17 NOTE — PATIENT INSTRUCTIONS
I am very excited to talk to you more about WEIGHT MANAGEMENT.  If you would like, you can start looking into a medication called PHENTERMINE/ADIPEX.    Briefly, I did point out to you that this medication is like ADRENALINE, and can improve your metabolism, and can even increase your energy and exercise tolerance!    It also suppresses your appetite.    Unfortunately, it does have side effects that may not always be ideal.  Most commonly, it can cause DRY MOUTH and CONSTIPATION.  It can also increase blood pressure and heart rate, and can cause palpitations.  It can also cause INSOMNIA.  It can sometimes cause urine hesitation, heat intolerance, irritability.    We will discuss all of these with the benefit of FASTING laboratory examinations.  Please get these done soon.    In the meantime, please start maximizing DIET.  [employer] diet book, DASH diet, Mediterranean diet, all good for ideas.  If you change your mind, let me know, and I will also refer you to a NUTRITIONIST/dietitian.    Also, start getting more physically active.  I am glad to hear that you will buy yourself a stationary bike!  Biking, brisk walking, swimming, aerobic activities are best.  Start out with brisk walking 10 minutes in the morning, and brisk walking 10 minutes in the afternoon, for a minimum of 20 minutes every day, including Sunday!    Please come back in 2 weeks with the benefit of fasting laboratory examinations.    Check your blood pressure in the evening, in a calm and resting state.  Seated position, feet flat on the floor, use the backrest of the chair.  Save your numbers for me, so that we can review them when you come back.    Today, you will benefit from a PNEUMONIA vaccination, just as we have been discussing all this time.  We will finally get it done today.    Come back in 2 weeks.  Call sooner as needed.  Please continue to take care of yourself and your family, and please continue to pray for our recovery from this  pandemic.  Take care and God bless.  I am excited for you!  See you in 2 weeks.            0  Return in 2 weeks.  20 minutes please.  Twelve-lead EKG.  Recheck blood pressure.  Review fasting laboratory examination results.  CSA phentermine/Adipex if appropriate.  Reassess mood, energy, function, preventive strategies, cardiovascular risk.            0

## 2024-07-17 NOTE — PROGRESS NOTES
"Subjective   Patient ID: Cindy Calvillo is a 74 y.o. female who presents for Annual Exam (Complete Physical Exam) and Sinus Problem.    HPI   The patient has yet to do her FASTING laboratory examinations.  In the meantime, states that she had to go to URGENT CARE for sinus congestion, upper respiratory symptoms, dyspnea on exertion, chest congestion.  Prescribed antibiotics, and after 2 weeks, she states that she is close to baseline.    Interested in weight loss regimens.  States that she does find herself sedentary, and does plan to  on physical activity.  Also interested in improving her diet habits.    No particular dry mouth, no odynophagia.  No abdominal distress.  No palpitations, dizziness, diaphoresis.  No particular insomnia.  Some heat intolerance reported by the patient.  ENDOCRINE with no polyuria, polydipsia, polyphagia.  No blurred vision.  No skin, hair, nail changes.  No dramatic weight loss or weight gain.      Otherwise, no lee substernal chest pain, no orthopnea, no paroxysmal nocturnal dyspnea.  No headache, blurred vision, diplopia.  No dysphagia.  Does want to improve quality of life, and does not wish harm to self or others.  CONSTITUTIONALLY, no fever, no chills.  No night sweats.  No lingering anorexia or nausea.  No apparent lymphadenopathy.  No apparent weight loss.    Back to using her CPAP machine.  States that she maintains her machine meticulously, and uses the CPAP machine even when she is watching TV, just in case she falls asleep.        Review of Systems  Review of systems as in history of present illness, and otherwise, reviewed separately as well, and was unremarkable/negative/noncontributory.        Objective   /75 (BP Location: Left arm, Patient Position: Sitting, BP Cuff Size: Adult)   Pulse 74   Ht 1.575 m (5' 2\")   Wt 104 kg (229 lb 14.4 oz)   SpO2 97%   BMI 42.05 kg/m²     Physical Exam  In good spirits.  Not in distress or diaphoresis.  Alert, " oriented x 3.  Amiable.  Not unkempt.  Obese build, but remaining independent and capable.  Eager to improve quality of life.  Does not wish harm to self or others.    HEAD pink palpebral conjunctivae, anicteric sclerae.  Mucous membranes moist.  No tonsillopharyngeal congestion, no thrush.  Mallampati 3 noted.  NECK supple, no apparent jugular venous distention.  CARDIOVASCULAR not in distress or diaphoresis.  No bipedal edema.  LUNGS not in distress or diaphoresis.  Not using accessory muscles.  Clear to auscultation bilaterally.  ABDOMEN soft, nontender.  BACK no costovertebral angle tenderness.  EXTREMITIES no clubbing, no cyanosis.  NEURO no facial asymmetry.  No apparent cranial nerve deficits.  Romberg negative.  Ambulating without need of assistance.  No apparent focal weakness.  No tremors.  PSYCH receptive, appropriate, and eager to maintain and improve quality of life.        Assessment/Plan   Diagnoses and all orders for this visit:  Seasonal allergic rhinitis due to pollen  -     Pneumococcal conjugate vaccine, 20-valent (PREVNAR 20)  -     Follow Up In Primary Care - Established; Future  Obstructive sleep apnea on CPAP  -     Pneumococcal conjugate vaccine, 20-valent (PREVNAR 20)  -     Follow Up In Primary Care - Established; Future  Morbid obesity with BMI of 40.0-44.9, adult (Multi)  -     Pneumococcal conjugate vaccine, 20-valent (PREVNAR 20)  -     Follow Up In Primary Care - Established; Future  Essential hypertension  -     Pneumococcal conjugate vaccine, 20-valent (PREVNAR 20)  -     Follow Up In Primary Care - Established; Future  Mixed hyperlipidemia  -     Pneumococcal conjugate vaccine, 20-valent (PREVNAR 20)  -     Follow Up In Primary Care - Established; Future  Prediabetes  -     Pneumococcal conjugate vaccine, 20-valent (PREVNAR 20)  -     Follow Up In Primary Care - Established; Future  Moderate episode of recurrent major depressive disorder (Multi)  -     Pneumococcal conjugate  vaccine, 20-valent (PREVNAR 20)  -     Follow Up In Primary Care - Established; Future       I am very excited to talk to you more about WEIGHT MANAGEMENT.  If you would like, you can start looking into a medication called PHENTERMINE/ADIPEX.    Briefly, I did point out to you that this medication is like ADRENALINE, and can improve your metabolism, and can even increase your energy and exercise tolerance!    It also suppresses your appetite.    Unfortunately, it does have side effects that may not always be ideal.  Most commonly, it can cause DRY MOUTH and CONSTIPATION.  It can also increase blood pressure and heart rate, and can cause palpitations.  It can also cause INSOMNIA.  It can sometimes cause urine hesitation, heat intolerance, irritability.    We will discuss all of these with the benefit of FASTING laboratory examinations.  Please get these done soon.    In the meantime, please start maximizing DIET.  [employer] diet book, DASH diet, Mediterranean diet, all good for ideas.  If you change your mind, let me know, and I will also refer you to a NUTRITIONIST/dietitian.    Also, start getting more physically active.  I am glad to hear that you will buy yourself a stationary bike!  Biking, brisk walking, swimming, aerobic activities are best.  Start out with brisk walking 10 minutes in the morning, and brisk walking 10 minutes in the afternoon, for a minimum of 20 minutes every day, including Sunday!    Please come back in 2 weeks with the benefit of fasting laboratory examinations.    Check your blood pressure in the evening, in a calm and resting state.  Seated position, feet flat on the floor, use the backrest of the chair.  Save your numbers for me, so that we can review them when you come back.    Today, you will benefit from a PNEUMONIA vaccination, just as we have been discussing all this time.  We will finally get it done today.    Come back in 2 weeks.  Call sooner as needed.  Please continue to take  care of yourself and your family, and please continue to pray for our recovery from this pandemic.  Take care and God bless.  I am excited for you!  See you in 2 weeks.            0  Return in 2 weeks.  20 minutes please.  Twelve-lead EKG.  Recheck blood pressure.  Review fasting laboratory examination results.  CSA phentermine/Adipex if appropriate.  Reassess mood, energy, function, preventive strategies, cardiovascular risk.            0

## 2024-07-23 DIAGNOSIS — I10 ESSENTIAL HYPERTENSION: ICD-10-CM

## 2024-07-23 RX ORDER — LISINOPRIL 5 MG/1
TABLET ORAL
Qty: 90 TABLET | Refills: 1 | Status: SHIPPED | OUTPATIENT
Start: 2024-07-23

## 2024-07-27 ENCOUNTER — LAB (OUTPATIENT)
Dept: LAB | Facility: LAB | Age: 74
End: 2024-07-27
Payer: COMMERCIAL

## 2024-07-27 DIAGNOSIS — I10 ESSENTIAL HYPERTENSION: ICD-10-CM

## 2024-07-27 DIAGNOSIS — R53.83 FATIGUE, UNSPECIFIED TYPE: ICD-10-CM

## 2024-07-27 DIAGNOSIS — E78.2 MIXED HYPERLIPIDEMIA: ICD-10-CM

## 2024-07-27 DIAGNOSIS — G47.33 OSA ON CPAP: ICD-10-CM

## 2024-07-27 DIAGNOSIS — E66.01 MORBID OBESITY WITH BMI OF 40.0-44.9, ADULT (MULTI): ICD-10-CM

## 2024-07-27 DIAGNOSIS — E55.9 VITAMIN D DEFICIENCY: ICD-10-CM

## 2024-07-27 DIAGNOSIS — R73.03 PREDIABETES: ICD-10-CM

## 2024-07-27 DIAGNOSIS — F33.1 MODERATE EPISODE OF RECURRENT MAJOR DEPRESSIVE DISORDER (MULTI): ICD-10-CM

## 2024-07-27 DIAGNOSIS — E79.0 HYPERURICEMIA: ICD-10-CM

## 2024-07-27 LAB
ALBUMIN SERPL BCP-MCNC: 4.1 G/DL (ref 3.4–5)
ALP SERPL-CCNC: 51 U/L (ref 33–136)
ALT SERPL W P-5'-P-CCNC: 20 U/L (ref 7–45)
ANION GAP SERPL CALC-SCNC: 15 MMOL/L (ref 10–20)
AST SERPL W P-5'-P-CCNC: 14 U/L (ref 9–39)
BILIRUB SERPL-MCNC: 1.2 MG/DL (ref 0–1.2)
BUN SERPL-MCNC: 14 MG/DL (ref 6–23)
CALCIUM SERPL-MCNC: 9.5 MG/DL (ref 8.6–10.3)
CHLORIDE SERPL-SCNC: 102 MMOL/L (ref 98–107)
CHOLEST SERPL-MCNC: 252 MG/DL (ref 0–199)
CHOLESTEROL/HDL RATIO: 6.5
CO2 SERPL-SCNC: 26 MMOL/L (ref 21–32)
CREAT SERPL-MCNC: 0.83 MG/DL (ref 0.5–1.05)
EGFRCR SERPLBLD CKD-EPI 2021: 75 ML/MIN/1.73M*2
EST. AVERAGE GLUCOSE BLD GHB EST-MCNC: 137 MG/DL
GLUCOSE SERPL-MCNC: 187 MG/DL (ref 74–99)
HBA1C MFR BLD: 6.4 %
HDLC SERPL-MCNC: 38.8 MG/DL
LDLC SERPL CALC-MCNC: 172 MG/DL
NON HDL CHOLESTEROL: 213 MG/DL (ref 0–149)
POTASSIUM SERPL-SCNC: 4.1 MMOL/L (ref 3.5–5.3)
PROT SERPL-MCNC: 6.9 G/DL (ref 6.4–8.2)
SODIUM SERPL-SCNC: 139 MMOL/L (ref 136–145)
TRIGL SERPL-MCNC: 204 MG/DL (ref 0–149)
TSH SERPL-ACNC: 2.08 MIU/L (ref 0.44–3.98)
VLDL: 41 MG/DL (ref 0–40)

## 2024-07-27 PROCEDURE — 80053 COMPREHEN METABOLIC PANEL: CPT

## 2024-07-27 PROCEDURE — 84443 ASSAY THYROID STIM HORMONE: CPT

## 2024-07-27 PROCEDURE — 36415 COLL VENOUS BLD VENIPUNCTURE: CPT

## 2024-07-27 PROCEDURE — 83036 HEMOGLOBIN GLYCOSYLATED A1C: CPT

## 2024-07-27 PROCEDURE — 80061 LIPID PANEL: CPT

## 2024-07-29 ENCOUNTER — TELEPHONE (OUTPATIENT)
Dept: PRIMARY CARE | Facility: CLINIC | Age: 74
End: 2024-07-29
Payer: COMMERCIAL

## 2024-07-29 DIAGNOSIS — B37.31 CANDIDAL VAGINITIS: Primary | ICD-10-CM

## 2024-07-29 NOTE — TELEPHONE ENCOUNTER
Pt is asking for diflucan, says due to her antibiotics she now has a yeast infection and cannot use otc stuff.

## 2024-08-01 DIAGNOSIS — B37.31 CANDIDAL VAGINITIS: ICD-10-CM

## 2024-08-01 RX ORDER — FLUCONAZOLE 150 MG/1
TABLET ORAL
Qty: 1 TABLET | Refills: 0 | Status: SHIPPED | OUTPATIENT
Start: 2024-08-01 | End: 2024-08-02

## 2024-08-02 ENCOUNTER — APPOINTMENT (OUTPATIENT)
Dept: PRIMARY CARE | Facility: CLINIC | Age: 74
End: 2024-08-02
Payer: COMMERCIAL

## 2024-08-02 VITALS
SYSTOLIC BLOOD PRESSURE: 127 MMHG | HEIGHT: 62 IN | OXYGEN SATURATION: 96 % | DIASTOLIC BLOOD PRESSURE: 90 MMHG | BODY MASS INDEX: 41.44 KG/M2 | WEIGHT: 225.2 LBS | HEART RATE: 77 BPM

## 2024-08-02 DIAGNOSIS — E78.2 MIXED HYPERLIPIDEMIA: ICD-10-CM

## 2024-08-02 DIAGNOSIS — F33.1 MODERATE EPISODE OF RECURRENT MAJOR DEPRESSIVE DISORDER (MULTI): ICD-10-CM

## 2024-08-02 DIAGNOSIS — I10 ESSENTIAL HYPERTENSION: ICD-10-CM

## 2024-08-02 DIAGNOSIS — J30.1 SEASONAL ALLERGIC RHINITIS DUE TO POLLEN: ICD-10-CM

## 2024-08-02 DIAGNOSIS — F51.02 ADJUSTMENT INSOMNIA: ICD-10-CM

## 2024-08-02 DIAGNOSIS — E66.01 MORBID OBESITY WITH BMI OF 40.0-44.9, ADULT (MULTI): Primary | ICD-10-CM

## 2024-08-02 DIAGNOSIS — R73.03 PREDIABETES: ICD-10-CM

## 2024-08-02 DIAGNOSIS — G47.33 OBSTRUCTIVE SLEEP APNEA ON CPAP: ICD-10-CM

## 2024-08-02 RX ORDER — FLUCONAZOLE 150 MG/1
TABLET ORAL
Qty: 3 TABLET | Refills: 0 | Status: SHIPPED | OUTPATIENT
Start: 2024-08-02

## 2024-08-02 RX ORDER — PHENTERMINE HYDROCHLORIDE 37.5 MG/1
TABLET ORAL
Qty: 30 TABLET | Refills: 0 | Status: SHIPPED | OUTPATIENT
Start: 2024-08-02

## 2024-08-02 RX ORDER — METOPROLOL SUCCINATE 25 MG/1
TABLET, EXTENDED RELEASE ORAL
Qty: 45 TABLET | Refills: 1 | Status: SHIPPED | OUTPATIENT
Start: 2024-08-02

## 2024-08-02 NOTE — PATIENT INSTRUCTIONS
Thank you very much for coming.  I am very happy to see you.    Your blood pressure is a little high, but after resting, it gets better.  Please continue taking your LISINOPRIL, as well as your DILTIAZEM.  We will do a twelve-lead EKG today to make sure that it is safe for you to take PHENTERMINE ADIPEX to help you lose weight.    Please take this phentermine with BREAKFAST, no later, to minimize any potential side effects.    Please  your medications from [facility], both the phentermine/Adipex, as well as the metoprolol.    To review, PHENTERMINE/ADIPEX is the medication that you will use to help you manage your weight.    It works by improving the way your METABOLISM works.  It helps to breakdown stored energy, and in the process, decreases your appetite.  You eat less, and you breakdown more!    It will work better if you maximize DIET.  [employer] diet book, DASH diet, Mediterranean diet, all very helpful.    It will work better if you maximize EXERCISE regimens.  Brisk walking, cycling, swimming, aerobic activities minimum 20 minutes every day.  You can do brisk walking 10 minutes in the morning, and brisk walking 10 minutes in the afternoon, for a minimum of 20 minutes a day, every day, including Sunday!      The most common side effect of this medication is DRY MOUTH, followed by CONSTIPATION.  Drink lots of fluids throughout the day.  Consider METAMUCIL as needed for constipation.  By the way, Metamucil is also good for weight management, because it helps you feel walsh faster, and it also absorbs excess fat and sugar and makes you poop these out.  Watch out for diarrhea.    Side effect to consider would be INSOMNIA, which you may already suffer from because of your history of anxiety.  To minimize this, drink only small amounts of fluids after supper.  Sips of water only, and urinate fully before going to bed.    This medication may also increase blood pressure and heart rate.  You are already on very  good medications, but to improve your tolerance to this medication, you will be taking all Metoprolol, as above.  Metoprolol will also help you sleep better.    This medication may also cause you to be irritable and have heat intolerance.  It can also cause insomnia, as above.  Take with breakfast, no later.  Drink lots of ice water throughout the day, and avoid extremes of temperature.    The typical weight loss response is 15 pounds in 3 to 6 months.  Some people do not lose weight, unfortunately.  Rarely, some people may even lose up to 50 pounds!    I sent [facility] prescriptions to [facility] in [place].  I hope you are able to pick them up.  Start metoprolol tonight, and start phentermine/Adipex tomorrow morning.  Always with breakfast.    Please come back next month.  Call sooner as needed.  Please continue to take care of yourself and your family, and please continue to pray for our recovery from this pandemic.    Your EKG is stable.  You are okay to take this medication.  I will see you again next month.  Please call sooner with questions or concerns, 911 first if urgent.  See you next month.  Take care and God bless.            0  Return in 1 month.  20 minutes please.  Phentermine/Adipex No. 2 if appropriate.  Review compliance, tolerance, diet, exercise, sleep, mood, energy, function.  Watch out for any comorbidities.            0

## 2024-08-02 NOTE — PROGRESS NOTES
Subjective   Patient ID: Cindy Calvillo is a 74 y.o. female who presents for Follow-up (Patient presented today for a 2 week follow up.).    HPI   Patient mildly anxious, but otherwise, looking forward to options regarding weight management.  Eager to review risks and benefits of use of PHENTERMINE/ADIPEX.  Has been compliant to medications, tolerating regimens.  No lee chest pain.  No orthopnea, no paroxysmal nocturnal dyspnea.  No dizziness, diaphoresis, palpitations.  No loss of consciousness.  No bipedal edema.  No remarkable dyspnea on exertion.      Has been eating more sensibly.  Looking forward to increased energy and exercise tolerance, and would like to take better care of self.  No headache, blurred vision, diplopia.  No dysphagia.  No focal weakness, ataxia, clumsiness.  No falls.  No paresthesia.  No confusion or delirium, with patient not worried about memory.  Not depressive or suicidal, with patient not wishing harm to self or others.      States that she does get up in the middle of the night having to go to the bathroom, or do tend to an ailing pet dog.  Otherwise, no dysuria, flank, suprapubic pain.  Appetite preserved, with no nausea, vomiting, abdominal distress.  No diarrhea, no constipation.  No apparent blood in stool.  No apparent weight loss.  No skin changes, rashes, pruritus, jaundice.  No easy bruisability.  ENDOCRINE with no polyuria, polydipsia, polyphagia.  No blurred vision.  No skin, hair, nail changes.  No dramatic weight loss or weight gain.    No particular cough, no particular sputum production.  CONSTITUTIONALLY, no fever, no chills.  No night sweats.  No lingering anorexia or nausea.  No apparent lymphadenopathy.  No apparent weight loss.        Review of Systems  Review of systems as in history of present illness, and otherwise, reviewed separately as well, and was unremarkable/negative/noncontributory.        Objective   /90 (BP Location: Left arm, Patient  "Position: Sitting, BP Cuff Size: Adult)   Pulse 77   Ht 1.575 m (5' 2\")   Wt 102 kg (225 lb 3.2 oz)   SpO2 96%   BMI 41.19 kg/m²     Physical Exam  In good spirits.  Not in distress or diaphoresis.  Alert, oriented x 3.  Amiable.  Not unkempt.  Receptive, cheerful, appropriate, and eager to improve quality of life.  Obese build, but remaining independent and capable.    HEAD pink palpebral conjunctivae, anicteric sclerae.  NECK supple, no apparent jugular venous distention.  CARDIOVASCULAR not in distress or diaphoresis.  No bipedal edema.  LUNGS not in distress or diaphoresis.  Not using accessory muscles.  ABDOMEN soft, nontender.  BACK no costovertebral angle tenderness.  EXTREMITIES no clubbing, no cyanosis.  NEURO no facial asymmetry.  No apparent cranial nerve deficits.  Romberg negative.  Ambulating without need of assistance.  No apparent focal weakness.  No tremors.  PSYCH receptive, appropriate, and eager to maintain and improve quality of life.        LABORATORY results reviewed, lipid panel with mixed hyperlipidemia, , triglycerides 204.  Hemoglobin A1c 6.4.  Vitamin D 35.  Liver and kidney function preserved.        Assessment/Plan   Diagnoses and all orders for this visit:  Morbid obesity with BMI of 40.0-44.9, adult (Multi)  -     Follow Up In Primary Care - Established  -     phentermine (Adipex-P) 37.5 mg tablet; Please take 1 tablet by mouth with BREAKFAST every day, no later.  Lots of fluids throughout the day.  Thank you.  -     Follow Up In Primary Care - Established; Future  -     ECG 12 lead (Clinic Performed)  Adjustment insomnia  -     Follow Up In Primary Care - Established; Future  Essential hypertension  -     metoprolol succinate XL (Toprol-XL) 25 mg 24 hr tablet; Please take ONLY HALF TABLET by mouth with SUPPER every evening.  Do not crush or chew.  Thank you.  -     Follow Up In Primary Care - Established; Future  -     ECG 12 lead (Clinic Performed)  Mixed " hyperlipidemia  -     phentermine (Adipex-P) 37.5 mg tablet; Please take 1 tablet by mouth with BREAKFAST every day, no later.  Lots of fluids throughout the day.  Thank you.  -     Follow Up In Primary Care - Established; Future  -     ECG 12 lead (Clinic Performed)  Prediabetes  -     phentermine (Adipex-P) 37.5 mg tablet; Please take 1 tablet by mouth with BREAKFAST every day, no later.  Lots of fluids throughout the day.  Thank you.  -     Follow Up In Primary Care - Established; Future  -     ECG 12 lead (Clinic Performed)  Obstructive sleep apnea on CPAP  -     Follow Up In Primary Care - Established; Future  -     ECG 12 lead (Clinic Performed)  Seasonal allergic rhinitis due to pollen  -     Follow Up In Primary Care - Established; Future  Moderate episode of recurrent major depressive disorder (Multi)  -     Follow Up In Primary Care - Established; Future  -     ECG 12 lead (Clinic Performed)        Thank you very much for coming.  I am very happy to see you.    Your blood pressure is a little high, but after resting, it gets better.  Please continue taking your LISINOPRIL, as well as your DILTIAZEM.  We will do a twelve-lead EKG today to make sure that it is safe for you to take PHENTERMINE ADIPEX to help you lose weight.    Please take this phentermine with BREAKFAST, no later, to minimize any potential side effects.    [facility] both the phentermine/Adipex, as well as the metoprolol.    To review, PHENTERMINE/ADIPEX is the medication that you will use to help you manage your weight.    It works by improving the way your METABOLISM works.  It helps to breakdown stored energy, and in the process, decreases your appetite.  You eat less, and you breakdown more!    It will work better if you maximize DIET.  [employer] diet book, DASH diet, Mediterranean diet, all very helpful.    It will work better if you maximize EXERCISE regimens.  Brisk walking, cycling, swimming,  aerobic activities minimum 20 minutes every day.  You can do brisk walking 10 minutes in the morning, and brisk walking 10 minutes in the afternoon, for a minimum of 20 minutes a day, every day, including Sunday!      The most common side effect of this medication is DRY MOUTH, followed by CONSTIPATION.  Drink lots of fluids throughout the day.  Consider METAMUCIL as needed for constipation.  By the way, Metamucil is also good for weight management, because it helps you feel walsh faster, and it also absorbs excess fat and sugar and makes you poop these out.  Watch out for diarrhea.    Side effect to consider would be INSOMNIA, which you may already suffer from because of your history of anxiety.  To minimize this, drink only small amounts of fluids after supper.  Sips of water only, and urinate fully before going to bed.    This medication may also increase blood pressure and heart rate.  You are already on very good medications, but to improve your tolerance to this medication, you will be taking all Metoprolol, as above.  Metoprolol will also help you sleep better.    This medication may also cause you to be irritable and have heat intolerance.  It can also cause insomnia, as above.  Take with breakfast, no later.  Drink lots of ice water throughout the day, and avoid extremes of temperature.    The typical weight loss response is 15 pounds in 3 to 6 months.  Some people do not lose weight, unfortunately.  Rarely, some people may even lose up to 50 pounds!    I sent your prescriptions to [facility] in [place].  I hope you are able to pick them up.  Start metoprolol tonight, and start phentermine/Adipex tomorrow morning.  Always with breakfast.    Please come back next month.  Call sooner as needed.  Please continue to take care of yourself and your family, and please continue to pray for our recovery from this pandemic.    Your EKG is stable.  You are okay to take this medication.  I will see you again next month.   Please call sooner with questions or concerns, 911 first if urgent.  See you next month.  Take care and God bless.            0  Return in 1 month.  20 minutes please.  Phentermine/Adipex No. 2 if appropriate.  Review compliance, tolerance, diet, exercise, sleep, mood, energy, function.  Watch out for any comorbidities.            0  In the future, assess options for HYPERTRIGLYCERIDEMIA.  Consider GLP-1 RA as well.            0

## 2024-08-05 DIAGNOSIS — E55.9 VITAMIN D DEFICIENCY: ICD-10-CM

## 2024-08-05 DIAGNOSIS — M81.0 AGE-RELATED OSTEOPOROSIS WITHOUT CURRENT PATHOLOGICAL FRACTURE: ICD-10-CM

## 2024-08-07 RX ORDER — ASPIRIN 325 MG
TABLET, DELAYED RELEASE (ENTERIC COATED) ORAL
Qty: 13 CAPSULE | Refills: 0 | Status: SHIPPED | OUTPATIENT
Start: 2024-08-07

## 2024-09-04 ENCOUNTER — APPOINTMENT (OUTPATIENT)
Dept: PRIMARY CARE | Facility: CLINIC | Age: 74
End: 2024-09-04
Payer: COMMERCIAL

## 2024-09-04 VITALS
OXYGEN SATURATION: 97 % | DIASTOLIC BLOOD PRESSURE: 76 MMHG | HEIGHT: 62 IN | WEIGHT: 218 LBS | BODY MASS INDEX: 40.12 KG/M2 | SYSTOLIC BLOOD PRESSURE: 127 MMHG | HEART RATE: 76 BPM

## 2024-09-04 DIAGNOSIS — J30.1 SEASONAL ALLERGIC RHINITIS DUE TO POLLEN: ICD-10-CM

## 2024-09-04 DIAGNOSIS — F33.1 MODERATE EPISODE OF RECURRENT MAJOR DEPRESSIVE DISORDER (MULTI): ICD-10-CM

## 2024-09-04 DIAGNOSIS — E66.01 CLASS 2 SEVERE OBESITY DUE TO EXCESS CALORIES WITH SERIOUS COMORBIDITY AND BODY MASS INDEX (BMI) OF 39.0 TO 39.9 IN ADULT (MULTI): Primary | ICD-10-CM

## 2024-09-04 DIAGNOSIS — Z12.31 SCREENING MAMMOGRAM FOR BREAST CANCER: ICD-10-CM

## 2024-09-04 DIAGNOSIS — Z12.11 SCREENING FOR COLON CANCER: ICD-10-CM

## 2024-09-04 DIAGNOSIS — R73.03 PREDIABETES: ICD-10-CM

## 2024-09-04 DIAGNOSIS — E66.01 MORBID OBESITY WITH BMI OF 40.0-44.9, ADULT (MULTI): ICD-10-CM

## 2024-09-04 DIAGNOSIS — G47.33 OBSTRUCTIVE SLEEP APNEA ON CPAP: ICD-10-CM

## 2024-09-04 DIAGNOSIS — F51.02 ADJUSTMENT INSOMNIA: ICD-10-CM

## 2024-09-04 DIAGNOSIS — L30.9 ECZEMA, UNSPECIFIED TYPE: ICD-10-CM

## 2024-09-04 DIAGNOSIS — E78.2 MIXED HYPERLIPIDEMIA: ICD-10-CM

## 2024-09-04 DIAGNOSIS — I10 ESSENTIAL HYPERTENSION: ICD-10-CM

## 2024-09-04 PROBLEM — E66.812 CLASS 2 SEVERE OBESITY DUE TO EXCESS CALORIES WITH SERIOUS COMORBIDITY AND BODY MASS INDEX (BMI) OF 39.0 TO 39.9 IN ADULT: Status: ACTIVE | Noted: 2023-02-25

## 2024-09-04 PROCEDURE — 1160F RVW MEDS BY RX/DR IN RCRD: CPT | Performed by: INTERNAL MEDICINE

## 2024-09-04 PROCEDURE — 1123F ACP DISCUSS/DSCN MKR DOCD: CPT | Performed by: INTERNAL MEDICINE

## 2024-09-04 PROCEDURE — 99213 OFFICE O/P EST LOW 20 MIN: CPT | Performed by: INTERNAL MEDICINE

## 2024-09-04 PROCEDURE — 3078F DIAST BP <80 MM HG: CPT | Performed by: INTERNAL MEDICINE

## 2024-09-04 PROCEDURE — 3074F SYST BP LT 130 MM HG: CPT | Performed by: INTERNAL MEDICINE

## 2024-09-04 PROCEDURE — 3008F BODY MASS INDEX DOCD: CPT | Performed by: INTERNAL MEDICINE

## 2024-09-04 PROCEDURE — 1159F MED LIST DOCD IN RCRD: CPT | Performed by: INTERNAL MEDICINE

## 2024-09-04 PROCEDURE — 1036F TOBACCO NON-USER: CPT | Performed by: INTERNAL MEDICINE

## 2024-09-04 RX ORDER — TRIAMCINOLONE ACETONIDE 1 MG/G
OINTMENT TOPICAL
Qty: 80 G | Refills: 0 | Status: SHIPPED | OUTPATIENT
Start: 2024-09-04

## 2024-09-04 RX ORDER — PHENTERMINE HYDROCHLORIDE 37.5 MG/1
TABLET ORAL
Qty: 30 TABLET | Refills: 0 | Status: SHIPPED | OUTPATIENT
Start: 2024-09-04 | End: 2024-09-06 | Stop reason: SDUPTHER

## 2024-09-04 NOTE — PATIENT INSTRUCTIONS
Thank you very much for coming.  I am very happy to see you.  After phentermine/Adipex No. 1, you have already lost a total of 11 pounds!  Congratulations!    Please continue taking your PHENTERMINE/ADIPEX with BREAKFAST, no later.  Please continue to drink lots of fluids, and consider Metamucil.  I am glad that you are not having any problems with dry mouth or CONSTIPATION.  Please continue to watch for loss of sleep or insomnia.  I am glad that you are coping with this as well.    Please continue to maximize DIET.  You are doing so well!  [place] diet book, DASH diet, Mediterranean diet for ideas.    Please continue to stay physically active.  There will be a lot of sun in [place]!  Lots of brisk walking please.    For your next phentermine/Adipex appointment, I do understand that you will be out of town, but we can talk over the telephone regarding how you are doing.  Be ready to please give me your blood pressure/heart rate, as well is your latest weight.    Please come back in the office in 2 months.    Until then, please continue to take care of yourself and your family.  You are doing very well!    We will try and see if we can schedule you for a MAMMOGRAM, as well as a COLONOSCOPY.    You will benefit from the INFLUENZA vaccination HIGH DOSE in about 2 weeks.  You can get this from any friendly pharmacist!  The best time to do it is late September/early October.    Again, thank you very much for coming.  Please do not hesitate to call with questions or concerns.  Please continue to take care of yourself and your family, and please continue to pray for our recovery from this pandemic.  Talk to you in 1 month.  See you in 2 months.  Take care and God bless.    I do understand that you developed a rough patch along your knuckles.  Try some TRIAMCINOLONE to see if this will alleviate the discomfort.  Try not to scratch please.  Apply thin layer of triamcinolone 2 times a day.  Do not cover with  bandage.            0  Telephone encounter in 1 month, phentermine/Adipex No. 3.  Physical appointment in 2 months, phentermine/Adipex No. 4.  Reassess efforts, response regarding weight management.  Reassess mood, energy, function, preventive strategies, colonoscopy, mammogram, influenza, RSV, shingles, COVID.  Review preventive strategies, cardiovascular risk.  Last fasting lab work July, consider repeat.            0

## 2024-09-04 NOTE — PROGRESS NOTES
"Subjective   Patient ID: Cindy Calvillo is a 74 y.o. female who presents for Weight Management FU (Pt in today for weight management FU).    HPI   The patient has been compliant to medications, tolerating regimens.  Has also been exercising more, and has been trying to eat more sensibly.  Does have a stationary bike which she is waiting to assemble.  Physically active.  Eating sensibly.  No lee substernal chest pain, no orthopnea, no paroxysmal nocturnal dyspnea.  No headache, blurry vision, diplopia, dysphagia.  Has been in good spirits, and continues to want to improve quality of life, does not wish harm to self or others.    No particular cough, no particular sputum production.  CONSTITUTIONALLY, no fever, no chills.  No night sweats.  No lingering anorexia or nausea.  No apparent lymphadenopathy.  No apparent weight loss.    No insomnia.  No particular dry mouth.  No constipation.  Appetite preserved, with no nausea, vomiting, abdominal distress.  No diarrhea, no constipation.  No apparent blood in stool.  No apparent weight loss.  No dysuria, flank, suprapubic pain.  No discharge, no pruritus.  No rash.  No malodor.  No hesitancy, no feeling of incomplete voiding.  No skin changes, rashes, pruritus, jaundice.  No easy bruisability.  ENDOCRINE with no polyuria, polydipsia, polyphagia.  No blurred vision.  No skin, hair, nail changes.  No dramatic weight loss or weight gain.        Review of Systems  Review of systems as in history of present illness, and otherwise, reviewed separately as well, and was unremarkable/negative/noncontributory.        Objective   /76 (BP Location: Left arm, Patient Position: Sitting)   Pulse 76   Ht 1.575 m (5' 2\")   Wt 98.9 kg (218 lb)   SpO2 97%   BMI 39.87 kg/m²     Physical Exam  In good spirits.  Not in distress or diaphoresis.  Alert, oriented x 3.  Amiable.  Not unkempt.  Obese build, but remaining active, independent, and capable.  Continues to want to improve " quality of life, and does not wish harm to self or others.  Appropriate.    HEAD pink palpebral conjunctivae, anicteric sclerae.  NECK supple, no apparent jugular venous distention.  CARDIOVASCULAR not in distress or diaphoresis.  No bipedal edema.  LUNGS not in distress or diaphoresis.  Not using accessory muscles.  ABDOMEN soft, nontender.  BACK no costovertebral angle tenderness.  EXTREMITIES no clubbing, no cyanosis.  NEURO no facial asymmetry.  No apparent cranial nerve deficits.  Romberg negative.  Ambulating without need of assistance.  No apparent focal weakness.  No tremors.  PSYCH receptive, appropriate, and eager to maintain and improve quality of life.        LABORATORY results reviewed, with latest fasting laboratory examinations July 2024.        Assessment/Plan   Diagnoses and all orders for this visit:  Class 2 severe obesity due to excess calories with serious comorbidity and body mass index (BMI) of 39.0 to 39.9 in adult (Multi)  -     phentermine (Adipex-P) 37.5 mg tablet; Please take 1 tablet by mouth with BREAKFAST every day, no later.  Lots of fluids throughout the day.  Thank you.  -     Follow Up In Primary Care - Established; Future  -     Follow Up In Primary Care - Established; Future  Morbid obesity with BMI of 40.0-44.9, adult (Multi)  -     Follow Up In Primary Care - Established  -     Follow Up In Primary Care - Established; Future  -     Follow Up In Primary Care - Established; Future  Adjustment insomnia  -     Follow Up In Primary Care - Established; Future  -     Follow Up In Primary Care - Established; Future  Essential hypertension  -     Follow Up In Primary Care - Established; Future  -     Follow Up In Primary Care - Established; Future  Mixed hyperlipidemia  -     phentermine (Adipex-P) 37.5 mg tablet; Please take 1 tablet by mouth with BREAKFAST every day, no later.  Lots of fluids throughout the day.  Thank you.  -     Follow Up In Primary Care - Established; Future  -      Follow Up In Primary Care - [facility]; Future  Prediabetes  -     phentermine (Adipex-P) 37.5 mg tablet; Please take 1 tablet by mouth with BREAKFAST every day, no later.  Lots of fluids throughout the day.  Thank you.  -     Follow Up In Primary Care - [facility]; Future  -     Follow Up In Primary Care [facility]; Future  Obstructive sleep apnea on CPAP  -     Follow Up In Primary Care [facility]; Future  -     Follow Up In Primary Care [facility]; Future  Seasonal allergic rhinitis due to pollen  -     Follow Up In Primary Care - [facility]; Future  -     Follow Up In Primary Care [facility]; Future  Moderate episode of recurrent major depressive disorder (Multi)  -     Follow Up In Primary Care [facility]; Future  -     Follow Up In Primary Care - Established; Future  Eczema, unspecified type  -     triamcinolone (Kenalog) 0.1 % ointment; Please apply thin layer to rashes 2 times a day.  Do not cover with bandage.  Drink lots of fluids throughout the day.  Thank you.  -     Follow Up In Primary Care - [facility]; Future  -     Follow Up In Primary Care [facility]; Future  Screening for colon cancer  -     Follow Up In Primary Care [facility]; Future  -     Follow Up In Primary Care [facility]; Future  -     Referral to Gastroenterology; Future  Screening mammogram for breast cancer  -     Follow Up In Primary Care [facility]; Future  -     Follow Up In Primary Care [facility]; Future  -     BI mammo bilateral screening tomosynthesis; Future       Thank you very much for coming.  I am very happy to see you.  After phentermine/Adipex No. 1, you have already lost a total of 11 pounds!  Congratulations!    Please continue taking your PHENTERMINE/ADIPEX with BREAKFAST, no later.  Please continue to drink lots of fluids, and consider Metamucil.  I am glad that you are not having any problems with dry mouth or CONSTIPATION.  Please continue to watch for loss of sleep or insomnia.  I  am glad that you are coping with this as well.    Please continue to maximize DIET.  You are doing so well!  [place] diet book, DASH diet, Mediterranean diet for ideas.    Please continue to stay physically active.  There will be a lot of sun in [place]!  Lots of brisk walking please.    For your next phentermine/Adipex appointment, I do understand that you will be out of town, but we can talk over the telephone regarding how you are doing.  Be ready to please give me your blood pressure/heart rate, as well is your latest weight.    Please come back in the office in 2 months.    Until then, please continue to take care of yourself and your family.  You are doing very well!    We will try and see if we can schedule you for a MAMMOGRAM, as well as a COLONOSCOPY.    You will benefit from the INFLUENZA vaccination HIGH DOSE in about 2 weeks.  You can get this from any friendly pharmacist!  The best time to do it is late September/early October.    Again, thank you very much for coming.  Please do not hesitate to call with questions or concerns.  Please continue to take care of yourself and your family, and please continue to pray for our recovery from this pandemic.  Talk to you in 1 month.  See you in 2 months.  Take care and God bless.    I do understand that you developed a rough patch along your knuckles.  Try some TRIAMCINOLONE to see if this will alleviate the discomfort.  Try not to scratch please.  Apply thin layer of triamcinolone 2 times a day.  Do not cover with bandage.            0  Telephone encounter in 1 month, phentermine/Adipex No. 3.  Physical appointment in 2 months, phentermine/Adipex No. 4.  Reassess efforts, response regarding weight management.  Reassess mood, energy, function, preventive strategies, colonoscopy, mammogram, influenza, RSV, shingles, COVID.  Review preventive strategies, cardiovascular risk.  Last fasting lab work July, consider repeat.            0  Patient not taking  ALENDRONATE.  I will try to figure out why not, and we will make the necessary adjustments.            0

## 2024-09-06 DIAGNOSIS — E78.2 MIXED HYPERLIPIDEMIA: ICD-10-CM

## 2024-09-06 DIAGNOSIS — R73.03 PREDIABETES: ICD-10-CM

## 2024-09-06 DIAGNOSIS — I10 ESSENTIAL HYPERTENSION: ICD-10-CM

## 2024-09-06 DIAGNOSIS — E66.01 CLASS 2 SEVERE OBESITY DUE TO EXCESS CALORIES WITH SERIOUS COMORBIDITY AND BODY MASS INDEX (BMI) OF 39.0 TO 39.9 IN ADULT (MULTI): ICD-10-CM

## 2024-09-06 RX ORDER — PHENTERMINE HYDROCHLORIDE 37.5 MG/1
TABLET ORAL
Qty: 30 TABLET | Refills: 0 | Status: SHIPPED | OUTPATIENT
Start: 2024-09-06

## 2024-09-06 RX ORDER — METOPROLOL SUCCINATE 25 MG/1
TABLET, EXTENDED RELEASE ORAL
Qty: 45 TABLET | Refills: 1 | Status: SHIPPED | OUTPATIENT
Start: 2024-09-06

## 2024-09-18 ENCOUNTER — APPOINTMENT (OUTPATIENT)
Dept: RADIOLOGY | Facility: CLINIC | Age: 74
End: 2024-09-18
Payer: COMMERCIAL

## 2024-09-27 DIAGNOSIS — M81.0 AGE-RELATED OSTEOPOROSIS WITHOUT CURRENT PATHOLOGICAL FRACTURE: ICD-10-CM

## 2024-09-27 RX ORDER — ALENDRONATE SODIUM TABLET 35 MG/1
35 TABLET ORAL
Qty: 13 TABLET | Refills: 1 | Status: SHIPPED | OUTPATIENT
Start: 2024-09-27

## 2024-10-02 ENCOUNTER — APPOINTMENT (OUTPATIENT)
Dept: PRIMARY CARE | Facility: CLINIC | Age: 74
End: 2024-10-02
Payer: COMMERCIAL

## 2024-10-02 VITALS — WEIGHT: 214 LBS | HEIGHT: 62 IN | BODY MASS INDEX: 39.38 KG/M2

## 2024-10-02 DIAGNOSIS — Z12.31 SCREENING MAMMOGRAM FOR BREAST CANCER: ICD-10-CM

## 2024-10-02 DIAGNOSIS — G47.33 OBSTRUCTIVE SLEEP APNEA ON CPAP: ICD-10-CM

## 2024-10-02 DIAGNOSIS — E66.01 CLASS 2 SEVERE OBESITY DUE TO EXCESS CALORIES WITH SERIOUS COMORBIDITY AND BODY MASS INDEX (BMI) OF 39.0 TO 39.9 IN ADULT: ICD-10-CM

## 2024-10-02 DIAGNOSIS — I10 ESSENTIAL HYPERTENSION: ICD-10-CM

## 2024-10-02 DIAGNOSIS — E78.2 MIXED HYPERLIPIDEMIA: ICD-10-CM

## 2024-10-02 DIAGNOSIS — R73.03 PREDIABETES: ICD-10-CM

## 2024-10-02 DIAGNOSIS — Z12.11 SCREENING FOR COLON CANCER: ICD-10-CM

## 2024-10-02 DIAGNOSIS — F33.1 MODERATE EPISODE OF RECURRENT MAJOR DEPRESSIVE DISORDER: ICD-10-CM

## 2024-10-02 DIAGNOSIS — E66.812 CLASS 2 SEVERE OBESITY DUE TO EXCESS CALORIES WITH SERIOUS COMORBIDITY AND BODY MASS INDEX (BMI) OF 39.0 TO 39.9 IN ADULT: ICD-10-CM

## 2024-10-02 DIAGNOSIS — F51.02 ADJUSTMENT INSOMNIA: ICD-10-CM

## 2024-10-02 PROCEDURE — 1160F RVW MEDS BY RX/DR IN RCRD: CPT | Performed by: INTERNAL MEDICINE

## 2024-10-02 PROCEDURE — 1123F ACP DISCUSS/DSCN MKR DOCD: CPT | Performed by: INTERNAL MEDICINE

## 2024-10-02 PROCEDURE — 1159F MED LIST DOCD IN RCRD: CPT | Performed by: INTERNAL MEDICINE

## 2024-10-02 PROCEDURE — 1036F TOBACCO NON-USER: CPT | Performed by: INTERNAL MEDICINE

## 2024-10-02 PROCEDURE — 99442 PR PHYS/QHP TELEPHONE EVALUATION 11-20 MIN: CPT | Performed by: INTERNAL MEDICINE

## 2024-10-02 PROCEDURE — 3008F BODY MASS INDEX DOCD: CPT | Performed by: INTERNAL MEDICINE

## 2024-10-02 RX ORDER — PHENTERMINE HYDROCHLORIDE 37.5 MG/1
TABLET ORAL
Qty: 30 TABLET | Refills: 0 | Status: SHIPPED | OUTPATIENT
Start: 2024-10-02

## 2024-10-02 NOTE — PROGRESS NOTES
"Subjective   Patient ID: Cindy Calvillo is a 74 y.o. female who presents for Telephone Visit (Pt doing a telephone visit for her weight management FU).    HPI   [place] but plans to be home by tonight.  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  She has been physically active trying to keep up with them, but in the meantime, has not been eating as ideally as she should.  ENDOCRINE with no polyuria, polydipsia, polyphagia.  No blurred vision.  No skin, hair, nail changes.  No dramatic weight loss or weight gain.      No particular abdominal distress.  No urinary changes.  No skin changes.  Continues to stay healthy, independent, and productive, and does not wish harm to self or others.        Review of Systems  Review of systems as in history of present illness, and otherwise, reviewed separately as well, and was unremarkable/negative/noncontributory.        Objective   Ht 1.575 m (5' 2\")   Wt 97.1 kg (214 lb)   BMI 39.14 kg/m²     Physical Exam  Seemingly in good spirits.  Not seemingly in distress.  Receptive, oriented x 3.  Seemingly amiable.  Remaining appropriate, and eager to maintain and hopefully improve quality of life.  Does not wish harm to self or others.    PSYCHIATRIC remaining appropriate.        LABORATORY results noted.  July workup mixed hyperlipidemia, hemoglobin A1c 6.4.  Current body mass index 39.14.    History urinary tract infection, currently seemingly asymptomatic.  Vitamin D 36 last checked in April.        Assessment/Plan   Diagnoses and all orders for this visit:  Class 2 severe obesity due to excess calories with serious comorbidity and body mass index (BMI) of 39.0 to 39.9 in adult  -     Follow Up In Primary Care - Established  -     phentermine (Adipex-P) 37.5 mg tablet; Please take 1 tablet by mouth with BREAKFAST every day, no later.  Lots of fluids throughout the day.  Thank you.  -     " Comprehensive Metabolic Panel; Future  -     TSH with reflex to Free T4 if abnormal; Future  Adjustment insomnia  -     Comprehensive Metabolic Panel; Future  -     TSH with reflex to Free T4 if abnormal; Future  Essential hypertension  -     Comprehensive Metabolic Panel; Future  -     TSH with reflex to Free T4 if abnormal; Future  -     Urinalysis with Reflex Culture and Microscopic; Future  Mixed hyperlipidemia  -     phentermine (Adipex-P) 37.5 mg tablet; Please take 1 tablet by mouth with BREAKFAST every day, no later.  Lots of fluids throughout the day.  Thank you.  -     Comprehensive Metabolic Panel; Future  -     Lipid Panel; Future  Prediabetes  -     phentermine (Adipex-P) 37.5 mg tablet; Please take 1 tablet by mouth with BREAKFAST every day, no later.  Lots of fluids throughout the day.  Thank you.  -     Comprehensive Metabolic Panel; Future  -     Urinalysis with Reflex Culture and Microscopic; Future  -     Hemoglobin A1C; Future  Obstructive sleep apnea on CPAP  -     Comprehensive Metabolic Panel; Future  -     TSH with reflex to Free T4 if abnormal; Future  Moderate episode of recurrent major depressive disorder  -     Comprehensive Metabolic Panel; Future  -     TSH with reflex to Free T4 if abnormal; Future  Screening for colon cancer  Screening mammogram for breast cancer       Thank you very much for allowing me to talk to you on the telephone so that we can continue your current regimen of PHENTERMINE/ADIPEX.    I do understand that you have had some modest weight loss, and that you can do better when you get home.  Please make sure to maximize DIET.  [place] diet book, DASH diet, Mediterranean diet for ideas.    Please continue to stay physically active.  Do at least 30 minutes of BRISK WALKING every day, 10 minutes in the morning, 10 minutes after lunch, and 10 minutes in the afternoon.  Please make sure to stay active even on Sunday.    All of these will contribute to better weight  loss results!    Please keep your appointment in late OCTOBER.  Do some FASTING blood and urine examinations a few days prior at any  facility, so that we can review how you are doing and make the necessary adjustments to keep you as good as you can get.    When you return, we will talk about possibly having a colonoscopy, mammogram, and we will review your fasting laboratory results, as well as your vaccination profile.  We will reassess your osteopenia/osteoporosis regimens.  Until then, please continue to take care of yourself and your family, and please continue to pray for our recovery from this pandemic.  Again, thank you for allow me to visit.  Take care and God bless.            0  Return in late October.  20 minutes please.  Repeat FASTING laboratory examination, then see me for phentermine/Adipex No. 4.  Review fasting laboratory results.  Review preventive strategies, colonoscopy, mammogram, vaccination profile.  Coordinate with behaviorist, GI, as patient is seen.  Reassess mood, energy, function.            0  Total time of encounter 20 minutes.            0

## 2024-10-02 NOTE — PATIENT INSTRUCTIONS
Thank you very much for allowing me to talk to you on the telephone so that we can continue your current regimen of PHENTERMINE/ADIPEX.    I do understand that you have had some modest weight loss, and that you can do better when you get home.  Please make sure to maximize DIET.  [employer] diet book, DASH diet, Mediterranean diet for ideas.    Please continue to stay physically active.  Do at least 30 minutes of BRISK WALKING every day, 10 minutes in the morning, 10 minutes after lunch, and 10 minutes in the afternoon.  Please make sure to stay active even on Sunday.    All of these will contribute to better weight loss results!    Please keep your appointment in late OCTOBER.  Do some FASTING blood and urine examinations a few days prior at any  facility, so that we can review how you are doing and make the necessary adjustments to keep you as good as you can get.    When you return, we will talk about possibly having a colonoscopy, mammogram, and we will review your fasting laboratory results, as well as your vaccination profile.  We will reassess your osteopenia/osteoporosis regimens.  Until then, please continue to take care of yourself and your family, and please continue to pray for our recovery from this pandemic.  Again, thank you for allow me to visit.  Take care and God bless.            0  Return in late October.  20 minutes please.  Repeat FASTING laboratory examination, then see me for phentermine/Adipex No. 4.  Review fasting laboratory results.  Review preventive strategies, colonoscopy, mammogram, vaccination profile.  Coordinate with behaviorist, GI, as patient is seen.  Reassess mood, energy, function.            0  Total time of encounter 20 minutes.            0

## 2024-10-18 ENCOUNTER — OFFICE VISIT (OUTPATIENT)
Dept: PRIMARY CARE | Facility: CLINIC | Age: 74
End: 2024-10-18
Payer: COMMERCIAL

## 2024-10-18 ENCOUNTER — APPOINTMENT (OUTPATIENT)
Dept: RADIOLOGY | Facility: CLINIC | Age: 74
End: 2024-10-18
Payer: COMMERCIAL

## 2024-10-18 VITALS
TEMPERATURE: 97.4 F | BODY MASS INDEX: 39.14 KG/M2 | SYSTOLIC BLOOD PRESSURE: 132 MMHG | OXYGEN SATURATION: 97 % | DIASTOLIC BLOOD PRESSURE: 61 MMHG | HEART RATE: 73 BPM | WEIGHT: 214 LBS

## 2024-10-18 DIAGNOSIS — E66.01 CLASS 2 SEVERE OBESITY DUE TO EXCESS CALORIES WITH SERIOUS COMORBIDITY AND BODY MASS INDEX (BMI) OF 39.0 TO 39.9 IN ADULT: ICD-10-CM

## 2024-10-18 DIAGNOSIS — S39.011A STRAIN OF ABDOMINAL WALL, INITIAL ENCOUNTER: Primary | ICD-10-CM

## 2024-10-18 DIAGNOSIS — K21.9 GASTROESOPHAGEAL REFLUX DISEASE, UNSPECIFIED WHETHER ESOPHAGITIS PRESENT: ICD-10-CM

## 2024-10-18 DIAGNOSIS — E66.812 CLASS 2 SEVERE OBESITY DUE TO EXCESS CALORIES WITH SERIOUS COMORBIDITY AND BODY MASS INDEX (BMI) OF 39.0 TO 39.9 IN ADULT: ICD-10-CM

## 2024-10-18 DIAGNOSIS — I10 ESSENTIAL HYPERTENSION: ICD-10-CM

## 2024-10-18 PROCEDURE — 1125F AMNT PAIN NOTED PAIN PRSNT: CPT | Performed by: INTERNAL MEDICINE

## 2024-10-18 PROCEDURE — 3075F SYST BP GE 130 - 139MM HG: CPT | Performed by: INTERNAL MEDICINE

## 2024-10-18 PROCEDURE — 1160F RVW MEDS BY RX/DR IN RCRD: CPT | Performed by: INTERNAL MEDICINE

## 2024-10-18 PROCEDURE — 1159F MED LIST DOCD IN RCRD: CPT | Performed by: INTERNAL MEDICINE

## 2024-10-18 PROCEDURE — 1123F ACP DISCUSS/DSCN MKR DOCD: CPT | Performed by: INTERNAL MEDICINE

## 2024-10-18 PROCEDURE — 3078F DIAST BP <80 MM HG: CPT | Performed by: INTERNAL MEDICINE

## 2024-10-18 PROCEDURE — 99213 OFFICE O/P EST LOW 20 MIN: CPT | Performed by: INTERNAL MEDICINE

## 2024-10-18 PROCEDURE — 1036F TOBACCO NON-USER: CPT | Performed by: INTERNAL MEDICINE

## 2024-10-18 ASSESSMENT — PAIN SCALES - GENERAL: PAINLEVEL_OUTOF10: 4

## 2024-10-18 NOTE — PATIENT INSTRUCTIONS
Thank very much for coming.  I am very happy to see you.    The good news is that your pain is likely related to MUSCLE STRAIN, and is relatively superficial.  It does not seem to involve the ribs, and is mainly muscular in nature.    Still, it is time to do your FASTING blood and urine examinations anytime soon.  You can have it done at the local  facility close to your home.  They will be open tomorrow 630 to noon, closed on Sundays, and open on weekdays 630 to 5 PM.    You will have to avoid the activities that caused the strain and still cause the pain!  Bending down to  the dog is quite a strain.  If you have to do that, please get yourself a chair and sit down right next to the dog and help him get up that way.  This will be less strain on your abdominal wall.    I do understand that you have been using IBUPROFEN 200 mg over-the-counter.  You can use 3 tablets = 600 mg, and you can do this up to 3 times in 24 hours.  Keep the doses 6 to 8 hours apart.  Always with food please.  Lots of fluids throughout the day.    You can also use gel or ointment like BIOFREEZE or VOLTAREN.  Classically, these medications are for joints, but you can also use them for your abdominal muscle wall.    Please keep your appointment October 28.  Do your laboratory examinations sometime soon, tomorrow or Monday.  You will have 2 sets of orders, 1 from October 2, and 1 from October 18, today.  Please let the technician know, although she or he will already have seen it in your records.    Again, thank you very much for coming.  Please do not hesitate to call with questions or concerns.  Please continue to take care of yourself and your family, and please continue to pray for our recovery from this pandemic.  Take care and God bless.            0  Keep appointment October 28.  Until then, patient will call me sooner with questions or concerns.            0

## 2024-10-18 NOTE — PROGRESS NOTES
Subjective   Patient ID: Cindy Calvillo is a 74 y.o. female who presents for Pain (Left side rib pain /Hurts with cough /Couple months ).    HPI   The patient was out of town, helping family members prepare place to live in.  She also has been spending time bending down to  a 50 pound dog off the ground to get him on his feet.  Lately, she has been having left upper quadrant abdominal discomfort.  She states that she takes ibuprofen 200 mg, 3 tablets by mouth once, sometimes twice a day, with some relief of symptoms.  No exacerbation of dyspeptic symptoms.  No gum or nose bleeding.  No easy bruisability.    Appetite preserved, no abdominal distress.  No dysuria, flank, suprapubic pain.  No overlying skin changes.  No history of trauma.    No headache, blurred vision, diplopia.  No dysphagia.  No cough, no sputum production.  No chest pain, palpitations, orthopnea, paroxysmal nocturnal dyspnea.        Review of Systems  Review of systems as in history of present illness, and otherwise, reviewed separately as well, and was unremarkable/negative/noncontributory.        Objective   /61   Pulse 73   Temp 36.3 °C (97.4 °F) (Oral)   Wt 97.1 kg (214 lb)   SpO2 97%   BMI 39.14 kg/m²     Physical Exam  Mildly anxious perhaps, but otherwise, not in distress or diaphoresis.  Receptive, oriented x 3.  Amiable.  Not unkempt.  Obese build, but remaining independent and capable.  Wishing improvement of quality of life.  Not wishing harm to self or others.  Appropriate.    HEAD pink palpebral conjunctivae, anicteric sclerae.  NECK supple, no apparent jugular venous distention.  CARDIOVASCULAR not in distress or diaphoresis.  No bipedal edema.  LUNGS not in distress or diaphoresis.  Not using accessory muscles.  ABDOMEN soft, with point of tenderness under her ribs, left lower quadrant.  No discrete hernia noted.  No rebound tenderness.  No overlying skin changes.  BACK no costovertebral angle  tenderness.  EXTREMITIES no clubbing, no cyanosis.  NEURO no facial asymmetry.  No apparent cranial nerve deficits.  Romberg negative.  Ambulating without need of assistance.  No apparent focal weakness.  No tremors.  PSYCH receptive, appropriate, and eager to maintain and improve quality of life.        LABORATORY results reviewed, to be updated soon.        Assessment/Plan   Diagnoses and all orders for this visit:  Strain of abdominal wall, initial encounter  -     CBC and Auto Differential; Future  -     Creatine Kinase; Future  Class 2 severe obesity due to excess calories with serious comorbidity and body mass index (BMI) of 39.0 to 39.9 in adult  -     CBC and Auto Differential; Future  Gastroesophageal reflux disease, unspecified whether esophagitis present  -     CBC and Auto Differential; Future  Essential hypertension  -     CBC and Auto Differential; Future  -     Creatine Kinase; Future       Thank very much for coming.  I am very happy to see you.    The good news is that your pain is likely related to MUSCLE STRAIN, and is relatively superficial.  It does not seem to involve the ribs, and is mainly muscular in nature.    Still, it is time to do your FASTING blood and urine examinations anytime soon.  You can have it done at the local  facility close to your home.  They will be open tomorrow 630 to noon, closed on Sundays, and open on weekdays 630 to 5 PM.    You will have to avoid the activities that caused the strain and still cause the pain!  Bending down to  the dog is quite a strain.  If you have to do that, please get yourself a chair and sit down right next to the dog and help him get up that way.  This will be less strain on your abdominal wall.    I do understand that you have been using IBUPROFEN 200 mg over-the-counter.  You can use 3 tablets = 600 mg, and you can do this up to 3 times in 24 hours.  Keep the doses 6 to 8 hours apart.  Always with food please.  Lots of fluids  throughout the day.    You can also use gel or ointment like BIOFREEZE or VOLTAREN.  Classically, these medications are for joints, but you can also use them for your abdominal muscle wall.    Please keep your appointment October 28.  Do your laboratory examinations sometime soon, tomorrow or Monday.  You will have 2 sets of orders, 1 from October 2, and 1 from October 18, today.  Please let the technician know, although she or he will already have seen it in your records.    Again, thank you very much for coming.  Please do not hesitate to call with questions or concerns.  Please continue to take care of yourself and your family, and please continue to pray for our recovery from this pandemic.  Take care and God bless.            0  Keep appointment October 28.  Until then, patient will call me sooner with questions or concerns.            0

## 2024-10-19 ENCOUNTER — LAB (OUTPATIENT)
Dept: LAB | Facility: LAB | Age: 74
End: 2024-10-19
Payer: COMMERCIAL

## 2024-10-19 DIAGNOSIS — K21.9 GASTROESOPHAGEAL REFLUX DISEASE, UNSPECIFIED WHETHER ESOPHAGITIS PRESENT: ICD-10-CM

## 2024-10-19 DIAGNOSIS — G47.33 OBSTRUCTIVE SLEEP APNEA ON CPAP: ICD-10-CM

## 2024-10-19 DIAGNOSIS — E78.2 MIXED HYPERLIPIDEMIA: ICD-10-CM

## 2024-10-19 DIAGNOSIS — I10 ESSENTIAL HYPERTENSION: ICD-10-CM

## 2024-10-19 DIAGNOSIS — F33.1 MODERATE EPISODE OF RECURRENT MAJOR DEPRESSIVE DISORDER: ICD-10-CM

## 2024-10-19 DIAGNOSIS — S39.011A STRAIN OF ABDOMINAL WALL, INITIAL ENCOUNTER: ICD-10-CM

## 2024-10-19 DIAGNOSIS — F51.02 ADJUSTMENT INSOMNIA: ICD-10-CM

## 2024-10-19 DIAGNOSIS — E66.812 CLASS 2 SEVERE OBESITY DUE TO EXCESS CALORIES WITH SERIOUS COMORBIDITY AND BODY MASS INDEX (BMI) OF 39.0 TO 39.9 IN ADULT: ICD-10-CM

## 2024-10-19 DIAGNOSIS — E66.01 CLASS 2 SEVERE OBESITY DUE TO EXCESS CALORIES WITH SERIOUS COMORBIDITY AND BODY MASS INDEX (BMI) OF 39.0 TO 39.9 IN ADULT: ICD-10-CM

## 2024-10-19 DIAGNOSIS — R73.03 PREDIABETES: ICD-10-CM

## 2024-10-19 LAB
ALBUMIN SERPL BCP-MCNC: 4 G/DL (ref 3.4–5)
ALP SERPL-CCNC: 59 U/L (ref 33–136)
ALT SERPL W P-5'-P-CCNC: 23 U/L (ref 7–45)
ANION GAP SERPL CALC-SCNC: 12 MMOL/L (ref 10–20)
AST SERPL W P-5'-P-CCNC: 17 U/L (ref 9–39)
BASOPHILS # BLD AUTO: 0.05 X10*3/UL (ref 0–0.1)
BASOPHILS NFR BLD AUTO: 0.7 %
BILIRUB SERPL-MCNC: 0.8 MG/DL (ref 0–1.2)
BUN SERPL-MCNC: 23 MG/DL (ref 6–23)
CALCIUM SERPL-MCNC: 8.9 MG/DL (ref 8.6–10.3)
CHLORIDE SERPL-SCNC: 104 MMOL/L (ref 98–107)
CHOLEST SERPL-MCNC: 221 MG/DL (ref 0–199)
CHOLESTEROL/HDL RATIO: 5.4
CK SERPL-CCNC: 45 U/L (ref 0–215)
CO2 SERPL-SCNC: 28 MMOL/L (ref 21–32)
CREAT SERPL-MCNC: 0.72 MG/DL (ref 0.5–1.05)
EGFRCR SERPLBLD CKD-EPI 2021: 89 ML/MIN/1.73M*2
EOSINOPHIL # BLD AUTO: 0.14 X10*3/UL (ref 0–0.4)
EOSINOPHIL NFR BLD AUTO: 1.8 %
ERYTHROCYTE [DISTWIDTH] IN BLOOD BY AUTOMATED COUNT: 13.2 % (ref 11.5–14.5)
EST. AVERAGE GLUCOSE BLD GHB EST-MCNC: 126 MG/DL
GLUCOSE SERPL-MCNC: 168 MG/DL (ref 74–99)
HBA1C MFR BLD: 6 %
HCT VFR BLD AUTO: 43.3 % (ref 36–46)
HDLC SERPL-MCNC: 41.2 MG/DL
HGB BLD-MCNC: 14.1 G/DL (ref 12–16)
IMM GRANULOCYTES # BLD AUTO: 0.02 X10*3/UL (ref 0–0.5)
IMM GRANULOCYTES NFR BLD AUTO: 0.3 % (ref 0–0.9)
LDLC SERPL CALC-MCNC: 155 MG/DL
LYMPHOCYTES # BLD AUTO: 1.81 X10*3/UL (ref 0.8–3)
LYMPHOCYTES NFR BLD AUTO: 23.9 %
MCH RBC QN AUTO: 30.4 PG (ref 26–34)
MCHC RBC AUTO-ENTMCNC: 32.6 G/DL (ref 32–36)
MCV RBC AUTO: 93 FL (ref 80–100)
MONOCYTES # BLD AUTO: 0.3 X10*3/UL (ref 0.05–0.8)
MONOCYTES NFR BLD AUTO: 4 %
NEUTROPHILS # BLD AUTO: 5.25 X10*3/UL (ref 1.6–5.5)
NEUTROPHILS NFR BLD AUTO: 69.3 %
NON HDL CHOLESTEROL: 180 MG/DL (ref 0–149)
NRBC BLD-RTO: 0 /100 WBCS (ref 0–0)
PLATELET # BLD AUTO: 245 X10*3/UL (ref 150–450)
POTASSIUM SERPL-SCNC: 4.3 MMOL/L (ref 3.5–5.3)
PROT SERPL-MCNC: 6.8 G/DL (ref 6.4–8.2)
RBC # BLD AUTO: 4.64 X10*6/UL (ref 4–5.2)
SODIUM SERPL-SCNC: 140 MMOL/L (ref 136–145)
TRIGL SERPL-MCNC: 125 MG/DL (ref 0–149)
TSH SERPL-ACNC: 1.26 MIU/L (ref 0.44–3.98)
VLDL: 25 MG/DL (ref 0–40)
WBC # BLD AUTO: 7.6 X10*3/UL (ref 4.4–11.3)

## 2024-10-19 PROCEDURE — 80061 LIPID PANEL: CPT

## 2024-10-19 PROCEDURE — 84443 ASSAY THYROID STIM HORMONE: CPT

## 2024-10-19 PROCEDURE — 82550 ASSAY OF CK (CPK): CPT

## 2024-10-19 PROCEDURE — 85025 COMPLETE CBC W/AUTO DIFF WBC: CPT

## 2024-10-19 PROCEDURE — 83036 HEMOGLOBIN GLYCOSYLATED A1C: CPT

## 2024-10-19 PROCEDURE — 36415 COLL VENOUS BLD VENIPUNCTURE: CPT

## 2024-10-19 PROCEDURE — 80053 COMPREHEN METABOLIC PANEL: CPT

## 2024-10-23 DIAGNOSIS — I10 ESSENTIAL HYPERTENSION: ICD-10-CM

## 2024-10-24 RX ORDER — DILTIAZEM HYDROCHLORIDE 120 MG/1
120 CAPSULE, EXTENDED RELEASE ORAL DAILY
Qty: 90 CAPSULE | Refills: 1 | Status: SHIPPED | OUTPATIENT
Start: 2024-10-24

## 2024-10-26 ENCOUNTER — LAB (OUTPATIENT)
Dept: LAB | Facility: LAB | Age: 74
End: 2024-10-26
Payer: COMMERCIAL

## 2024-10-26 DIAGNOSIS — I10 ESSENTIAL HYPERTENSION: ICD-10-CM

## 2024-10-26 DIAGNOSIS — R73.03 PREDIABETES: ICD-10-CM

## 2024-10-26 LAB
APPEARANCE UR: CLEAR
BILIRUB UR STRIP.AUTO-MCNC: NEGATIVE MG/DL
COLOR UR: NORMAL
GLUCOSE UR STRIP.AUTO-MCNC: NORMAL MG/DL
HOLD SPECIMEN: NORMAL
KETONES UR STRIP.AUTO-MCNC: NEGATIVE MG/DL
LEUKOCYTE ESTERASE UR QL STRIP.AUTO: NEGATIVE
NITRITE UR QL STRIP.AUTO: NEGATIVE
PH UR STRIP.AUTO: 5.5 [PH]
PROT UR STRIP.AUTO-MCNC: NEGATIVE MG/DL
RBC # UR STRIP.AUTO: NEGATIVE /UL
SP GR UR STRIP.AUTO: 1.02
UROBILINOGEN UR STRIP.AUTO-MCNC: NORMAL MG/DL

## 2024-10-26 PROCEDURE — 81003 URINALYSIS AUTO W/O SCOPE: CPT

## 2024-10-28 ENCOUNTER — APPOINTMENT (OUTPATIENT)
Dept: PRIMARY CARE | Facility: CLINIC | Age: 74
End: 2024-10-28
Payer: COMMERCIAL

## 2024-10-28 VITALS
HEART RATE: 86 BPM | BODY MASS INDEX: 39.2 KG/M2 | WEIGHT: 213 LBS | OXYGEN SATURATION: 97 % | SYSTOLIC BLOOD PRESSURE: 104 MMHG | DIASTOLIC BLOOD PRESSURE: 64 MMHG | HEIGHT: 62 IN

## 2024-10-28 DIAGNOSIS — F51.02 ADJUSTMENT INSOMNIA: ICD-10-CM

## 2024-10-28 DIAGNOSIS — I10 ESSENTIAL HYPERTENSION: ICD-10-CM

## 2024-10-28 DIAGNOSIS — Z12.11 SCREENING FOR COLON CANCER: ICD-10-CM

## 2024-10-28 DIAGNOSIS — E78.2 MIXED HYPERLIPIDEMIA: ICD-10-CM

## 2024-10-28 DIAGNOSIS — E66.812 CLASS 2 SEVERE OBESITY DUE TO EXCESS CALORIES WITH SERIOUS COMORBIDITY AND BODY MASS INDEX (BMI) OF 38.0 TO 38.9 IN ADULT: Primary | ICD-10-CM

## 2024-10-28 DIAGNOSIS — Z12.31 SCREENING MAMMOGRAM FOR BREAST CANCER: ICD-10-CM

## 2024-10-28 DIAGNOSIS — E66.01 CLASS 2 SEVERE OBESITY DUE TO EXCESS CALORIES WITH SERIOUS COMORBIDITY AND BODY MASS INDEX (BMI) OF 38.0 TO 38.9 IN ADULT: Primary | ICD-10-CM

## 2024-10-28 DIAGNOSIS — G47.33 OBSTRUCTIVE SLEEP APNEA ON CPAP: ICD-10-CM

## 2024-10-28 DIAGNOSIS — R73.03 PREDIABETES: ICD-10-CM

## 2024-10-28 DIAGNOSIS — J30.1 SEASONAL ALLERGIC RHINITIS DUE TO POLLEN: ICD-10-CM

## 2024-10-28 DIAGNOSIS — L30.9 ECZEMA, UNSPECIFIED TYPE: ICD-10-CM

## 2024-10-28 DIAGNOSIS — E55.9 VITAMIN D DEFICIENCY: ICD-10-CM

## 2024-10-28 DIAGNOSIS — M81.0 AGE-RELATED OSTEOPOROSIS WITHOUT CURRENT PATHOLOGICAL FRACTURE: ICD-10-CM

## 2024-10-28 DIAGNOSIS — F33.1 MODERATE EPISODE OF RECURRENT MAJOR DEPRESSIVE DISORDER: ICD-10-CM

## 2024-10-28 DIAGNOSIS — M79.10 MYALGIA: ICD-10-CM

## 2024-10-28 PROCEDURE — G2211 COMPLEX E/M VISIT ADD ON: HCPCS | Performed by: INTERNAL MEDICINE

## 2024-10-28 PROCEDURE — 1036F TOBACCO NON-USER: CPT | Performed by: INTERNAL MEDICINE

## 2024-10-28 PROCEDURE — 3074F SYST BP LT 130 MM HG: CPT | Performed by: INTERNAL MEDICINE

## 2024-10-28 PROCEDURE — 3008F BODY MASS INDEX DOCD: CPT | Performed by: INTERNAL MEDICINE

## 2024-10-28 PROCEDURE — 1159F MED LIST DOCD IN RCRD: CPT | Performed by: INTERNAL MEDICINE

## 2024-10-28 PROCEDURE — 90662 IIV NO PRSV INCREASED AG IM: CPT | Performed by: INTERNAL MEDICINE

## 2024-10-28 PROCEDURE — 1160F RVW MEDS BY RX/DR IN RCRD: CPT | Performed by: INTERNAL MEDICINE

## 2024-10-28 PROCEDURE — G0008 ADMIN INFLUENZA VIRUS VAC: HCPCS | Performed by: INTERNAL MEDICINE

## 2024-10-28 PROCEDURE — 3078F DIAST BP <80 MM HG: CPT | Performed by: INTERNAL MEDICINE

## 2024-10-28 PROCEDURE — 99213 OFFICE O/P EST LOW 20 MIN: CPT | Performed by: INTERNAL MEDICINE

## 2024-10-28 PROCEDURE — 1123F ACP DISCUSS/DSCN MKR DOCD: CPT | Performed by: INTERNAL MEDICINE

## 2024-10-28 RX ORDER — ASPIRIN 325 MG
TABLET, DELAYED RELEASE (ENTERIC COATED) ORAL
Qty: 13 CAPSULE | Refills: 0 | Status: SHIPPED | OUTPATIENT
Start: 2024-10-28

## 2024-10-28 RX ORDER — TIZANIDINE 2 MG/1
TABLET ORAL
Qty: 45 TABLET | Refills: 0 | Status: SHIPPED | OUTPATIENT
Start: 2024-10-28

## 2024-10-28 RX ORDER — PHENTERMINE HYDROCHLORIDE 37.5 MG/1
TABLET ORAL
Qty: 30 TABLET | Refills: 0 | Status: SHIPPED | OUTPATIENT
Start: 2024-10-28

## 2024-10-30 ENCOUNTER — APPOINTMENT (OUTPATIENT)
Dept: GASTROENTEROLOGY | Facility: CLINIC | Age: 74
End: 2024-10-30
Payer: COMMERCIAL

## 2024-10-30 ENCOUNTER — APPOINTMENT (OUTPATIENT)
Dept: PRIMARY CARE | Facility: CLINIC | Age: 74
End: 2024-10-30
Payer: COMMERCIAL

## 2024-10-31 ENCOUNTER — HOSPITAL ENCOUNTER (OUTPATIENT)
Dept: RADIOLOGY | Facility: CLINIC | Age: 74
Discharge: HOME | End: 2024-10-31
Payer: COMMERCIAL

## 2024-10-31 VITALS — BODY MASS INDEX: 39.2 KG/M2 | HEIGHT: 62 IN | WEIGHT: 213 LBS

## 2024-10-31 DIAGNOSIS — Z12.31 SCREENING MAMMOGRAM FOR BREAST CANCER: ICD-10-CM

## 2024-10-31 PROCEDURE — 77063 BREAST TOMOSYNTHESIS BI: CPT

## 2024-11-05 DIAGNOSIS — R92.8 ABNORMAL MAMMOGRAM: Primary | ICD-10-CM

## 2024-11-05 NOTE — PROGRESS NOTES
Diagnoses and all orders for this visit:  Abnormal mammogram (Primary)  -     BI mammo right diagnostic; Future

## 2024-11-14 ENCOUNTER — APPOINTMENT (OUTPATIENT)
Dept: BEHAVIORAL HEALTH | Facility: CLINIC | Age: 74
End: 2024-11-14
Payer: COMMERCIAL

## 2024-11-14 VITALS
BODY MASS INDEX: 38.11 KG/M2 | HEIGHT: 62 IN | DIASTOLIC BLOOD PRESSURE: 63 MMHG | HEART RATE: 84 BPM | SYSTOLIC BLOOD PRESSURE: 152 MMHG | RESPIRATION RATE: 18 BRPM | TEMPERATURE: 97.7 F | WEIGHT: 207.1 LBS

## 2024-11-14 DIAGNOSIS — F41.8 DEPRESSION WITH ANXIETY: ICD-10-CM

## 2024-11-14 PROCEDURE — 1159F MED LIST DOCD IN RCRD: CPT | Performed by: PSYCHIATRY & NEUROLOGY

## 2024-11-14 PROCEDURE — 1123F ACP DISCUSS/DSCN MKR DOCD: CPT | Performed by: PSYCHIATRY & NEUROLOGY

## 2024-11-14 PROCEDURE — 1160F RVW MEDS BY RX/DR IN RCRD: CPT | Performed by: PSYCHIATRY & NEUROLOGY

## 2024-11-14 PROCEDURE — 3078F DIAST BP <80 MM HG: CPT | Performed by: PSYCHIATRY & NEUROLOGY

## 2024-11-14 PROCEDURE — 3008F BODY MASS INDEX DOCD: CPT | Performed by: PSYCHIATRY & NEUROLOGY

## 2024-11-14 PROCEDURE — 99214 OFFICE O/P EST MOD 30 MIN: CPT | Performed by: PSYCHIATRY & NEUROLOGY

## 2024-11-14 PROCEDURE — 3077F SYST BP >= 140 MM HG: CPT | Performed by: PSYCHIATRY & NEUROLOGY

## 2024-11-14 RX ORDER — VENLAFAXINE HYDROCHLORIDE 37.5 MG/1
37.5 CAPSULE, EXTENDED RELEASE ORAL DAILY
Qty: 90 CAPSULE | Refills: 1 | Status: SHIPPED | OUTPATIENT
Start: 2024-11-14 | End: 2025-05-13

## 2024-11-14 RX ORDER — VENLAFAXINE HYDROCHLORIDE 150 MG/1
150 CAPSULE, EXTENDED RELEASE ORAL DAILY
Qty: 90 CAPSULE | Refills: 1 | Status: SHIPPED | OUTPATIENT
Start: 2024-11-14 | End: 2025-05-13

## 2024-11-14 NOTE — ASSESSMENT & PLAN NOTE
The risks, benefits & alternatives to the medications prescribed were explained to you today. You were able to understand & repeat these risks, benefits & alternatives to this prescribed medication. You have agreed to proceed with treatment with the medications discussed based on the conclusion that the benefit outweighs the risks of this treatment regimen: continue venlafaxine 187.5 mg ER daily.   Orders:    venlafaxine XR (Effexor XR) 37.5 mg 24 hr capsule; Take 1 capsule (37.5 mg) by mouth once daily. Do not crush or chew.    venlafaxine XR (Effexor-XR) 150 mg 24 hr capsule; Take 1 capsule (150 mg) by mouth once daily. Do not crush or chew.

## 2024-11-14 NOTE — PROGRESS NOTES
Subjective   Patient ID: Cindy Calvillo is a 74 y.o. female who presents for No chief complaint on file. I am doing well.     HPI: The patient states that she is much improved. The patient is at home with her son with whom she has a conflictual relationship. However, her relationship with her son is better now. The patient at this time is doing much better socializing and being active. She reports she has lost over 20 pounds and feels better losing the weight.       MSE: The patient is alert, fully oriented, language is intact, and recent and remote memory intact. The patient denies any suicidal or homicidal ideation or plans. The patient presents with anxious and depressive features in substantial remission without manic or psychotic symptoms. Thought is logical and clear. Judgment and insight are limited. The patient has had more vegetative features.         Review of Systems   Neurological:         MSE: The patient is alert, fully oriented, language is intact, and recent and remote memory intact. The patient denies any suicidal or homicidal ideation or plans. The patient presents with anxious and depressive features in substantial remission without manic or psychotic symptoms. Thought is logical and clear. Judgment and insight are limited. The patient has had more vegetative features.      Psychiatric/Behavioral:          MSE: The patient is alert, fully oriented, language is intact, and recent and remote memory intact. The patient denies any suicidal or homicidal ideation or plans. The patient presents with anxious and depressive features in substantial remission without manic or psychotic symptoms. Thought is logical and clear. Judgment and insight are limited. The patient has had more vegetative features.        Psych Review of Symptoms:    ADHD: Patient denied any symptoms.         Anxiety: Patient denied any symptoms.         Developmental and Sensory Concerns: Patient denied any symptoms.          Depressive Symptoms: Patient denied any symptoms.         Disruptive and Conduct Symptoms: Patient denied any symptoms.         Eating / Feeding Concerns: Patient denied any symptoms.         Elimination Symptoms: Patient denied any symptoms.         Manic Symptoms: Patient denied any symptoms.         Obsessive-Compulsive Symptoms: Patient denied any symptoms.         Psychotic Symptoms: Patient denied any symptoms.           Trauma Related Symptoms: Patient denied any symptoms.           Sleep Concerns: Patient denied any symptoms.             Objective   Physical Exam  Neurological:      Mental Status: She is alert.      Comments: MSE: The patient is alert, fully oriented, language is intact, and recent and remote memory intact. The patient denies any suicidal or homicidal ideation or plans. The patient presents with anxious and depressive features in substantial remission without manic or psychotic symptoms. Thought is logical and clear. Judgment and insight are limited. The patient has had more vegetative features.      Psychiatric:      Comments: MSE: The patient is alert, fully oriented, language is intact, and recent and remote memory intact. The patient denies any suicidal or homicidal ideation or plans. The patient presents with anxious and depressive features in substantial remission without manic or psychotic symptoms. Thought is logical and clear. Judgment and insight are limited. The patient has had more vegetative features.            Lab Review:   Lab on 10/26/2024   Component Date Value    Color, Urine 10/26/2024 Light-Yellow     Appearance, Urine 10/26/2024 Clear     Specific Gravity, Urine 10/26/2024 1.019     pH, Urine 10/26/2024 5.5     Protein, Urine 10/26/2024 NEGATIVE     Glucose, Urine 10/26/2024 Normal     Blood, Urine 10/26/2024 NEGATIVE     Ketones, Urine 10/26/2024 NEGATIVE     Bilirubin, Urine 10/26/2024 NEGATIVE     Urobilinogen, Urine 10/26/2024 Normal     Nitrite, Urine  10/26/2024 NEGATIVE     Leukocyte Esterase, Urine 10/26/2024 NEGATIVE     Extra Tube 10/26/2024 Hold for add-ons.    Lab on 10/19/2024   Component Date Value    Glucose 10/19/2024 168 (H)     Sodium 10/19/2024 140     Potassium 10/19/2024 4.3     Chloride 10/19/2024 104     Bicarbonate 10/19/2024 28     Anion Gap 10/19/2024 12     Urea Nitrogen 10/19/2024 23     Creatinine 10/19/2024 0.72     eGFR 10/19/2024 89     Calcium 10/19/2024 8.9     Albumin 10/19/2024 4.0     Alkaline Phosphatase 10/19/2024 59     Total Protein 10/19/2024 6.8     AST 10/19/2024 17     Bilirubin, Total 10/19/2024 0.8     ALT 10/19/2024 23     Thyroid Stimulating Horm* 10/19/2024 1.26     Cholesterol 10/19/2024 221 (H)     HDL-Cholesterol 10/19/2024 41.2     Cholesterol/HDL Ratio 10/19/2024 5.4     LDL Calculated 10/19/2024 155 (H)     VLDL 10/19/2024 25     Triglycerides 10/19/2024 125     Non HDL Cholesterol 10/19/2024 180 (H)     Hemoglobin A1C 10/19/2024 6.0 (H)     Estimated Average Glucose 10/19/2024 126     WBC 10/19/2024 7.6     nRBC 10/19/2024 0.0     RBC 10/19/2024 4.64     Hemoglobin 10/19/2024 14.1     Hematocrit 10/19/2024 43.3     MCV 10/19/2024 93     MCH 10/19/2024 30.4     MCHC 10/19/2024 32.6     RDW 10/19/2024 13.2     Platelets 10/19/2024 245     Neutrophils % 10/19/2024 69.3     Immature Granulocytes %,* 10/19/2024 0.3     Lymphocytes % 10/19/2024 23.9     Monocytes % 10/19/2024 4.0     Eosinophils % 10/19/2024 1.8     Basophils % 10/19/2024 0.7     Neutrophils Absolute 10/19/2024 5.25     Immature Granulocytes Ab* 10/19/2024 0.02     Lymphocytes Absolute 10/19/2024 1.81     Monocytes Absolute 10/19/2024 0.30     Eosinophils Absolute 10/19/2024 0.14     Basophils Absolute 10/19/2024 0.05     Creatine Kinase 10/19/2024 45    Lab on 07/27/2024   Component Date Value    Glucose 07/27/2024 187 (H)     Sodium 07/27/2024 139     Potassium 07/27/2024 4.1     Chloride 07/27/2024 102     Bicarbonate 07/27/2024 26     Anion Gap  07/27/2024 15     Urea Nitrogen 07/27/2024 14     Creatinine 07/27/2024 0.83     eGFR 07/27/2024 75     Calcium 07/27/2024 9.5     Albumin 07/27/2024 4.1     Alkaline Phosphatase 07/27/2024 51     Total Protein 07/27/2024 6.9     AST 07/27/2024 14     Bilirubin, Total 07/27/2024 1.2     ALT 07/27/2024 20     Thyroid Stimulating Horm* 07/27/2024 2.08     Hemoglobin A1C 07/27/2024 6.4 (H)     Estimated Average Glucose 07/27/2024 137     Cholesterol 07/27/2024 252 (H)     HDL-Cholesterol 07/27/2024 38.8     Cholesterol/HDL Ratio 07/27/2024 6.5     LDL Calculated 07/27/2024 172 (H)     VLDL 07/27/2024 41 (H)     Triglycerides 07/27/2024 204 (H)     Non HDL Cholesterol 07/27/2024 213 (H)        Assessment/Plan   Psychiatric Risk Assessment  Violence Risk Assessment: none  Acute Risk of Harm to Others is Considered: low   Suicide Risk Assessment: age > 65 yrs old and   Protective Factors against Suicide: adherence to  treatment, fear of suicide, moral objections to suicide, positive family relationships, and sense of responsibility toward family  Acute Risk of Harm to Self is Considered: low    Imminent Risk of Suicide or Serious Self-Injury: Low   Chronic Risk of Suicide of Serious Self-Injury: Low  Risk factors: Age, depression history and   Protective factors: Denies current suicidal ideation, denies history of suicide attempts , willingness to seek help and support , gender, access to a variety of clinical interventions , and receiving and engaged in care for mental, physical, and substance use disorders      Imminent Risk of Violence or Homicide: Low   Risk Factors: No significant risk factors identified on screening  Protective Factors: Lack of known history of harm to others , Lack of known history of violent ideation , and lack of known access to firearms.     Assessment & Plan  Depression with anxiety  The risks, benefits & alternatives to the medications prescribed were explained to you today. You were  able to understand & repeat these risks, benefits & alternatives to this prescribed medication. You have agreed to proceed with treatment with the medications discussed based on the conclusion that the benefit outweighs the risks of this treatment regimen: continue venlafaxine 187.5 mg ER daily.   Orders:    venlafaxine XR (Effexor XR) 37.5 mg 24 hr capsule; Take 1 capsule (37.5 mg) by mouth once daily. Do not crush or chew.    venlafaxine XR (Effexor-XR) 150 mg 24 hr capsule; Take 1 capsule (150 mg) by mouth once daily. Do not crush or chew.      Follow up in late April of 2025.  Time:   Prep time on date of the patient encounter: 5 minutes.   Time spent directly with patient/family/caregiver: 20 minutes.   Additional time spent on patient care activities: 0 minutes.   Documentation time: 5 minutes.   Total time on date of patient encounter:  minutes.

## 2024-11-21 ENCOUNTER — APPOINTMENT (OUTPATIENT)
Dept: PRIMARY CARE | Facility: CLINIC | Age: 74
End: 2024-11-21
Payer: COMMERCIAL

## 2024-11-21 VITALS
OXYGEN SATURATION: 98 % | BODY MASS INDEX: 38.55 KG/M2 | HEART RATE: 80 BPM | WEIGHT: 209.5 LBS | HEIGHT: 62 IN | SYSTOLIC BLOOD PRESSURE: 108 MMHG | DIASTOLIC BLOOD PRESSURE: 66 MMHG

## 2024-11-21 DIAGNOSIS — E66.01 CLASS 2 SEVERE OBESITY DUE TO EXCESS CALORIES WITH SERIOUS COMORBIDITY AND BODY MASS INDEX (BMI) OF 38.0 TO 38.9 IN ADULT: Primary | ICD-10-CM

## 2024-11-21 DIAGNOSIS — R92.8 ABNORMAL MAMMOGRAM: ICD-10-CM

## 2024-11-21 DIAGNOSIS — R73.03 PREDIABETES: ICD-10-CM

## 2024-11-21 DIAGNOSIS — E55.9 VITAMIN D DEFICIENCY: ICD-10-CM

## 2024-11-21 DIAGNOSIS — G47.33 OBSTRUCTIVE SLEEP APNEA ON CPAP: ICD-10-CM

## 2024-11-21 DIAGNOSIS — E78.2 MIXED HYPERLIPIDEMIA: ICD-10-CM

## 2024-11-21 DIAGNOSIS — F33.1 MODERATE EPISODE OF RECURRENT MAJOR DEPRESSIVE DISORDER: ICD-10-CM

## 2024-11-21 DIAGNOSIS — E53.8 VITAMIN B12 DEFICIENCY: ICD-10-CM

## 2024-11-21 DIAGNOSIS — Z12.11 SCREENING FOR COLON CANCER: ICD-10-CM

## 2024-11-21 DIAGNOSIS — E66.812 CLASS 2 SEVERE OBESITY DUE TO EXCESS CALORIES WITH SERIOUS COMORBIDITY AND BODY MASS INDEX (BMI) OF 38.0 TO 38.9 IN ADULT: Primary | ICD-10-CM

## 2024-11-21 DIAGNOSIS — M81.0 AGE-RELATED OSTEOPOROSIS WITHOUT CURRENT PATHOLOGICAL FRACTURE: ICD-10-CM

## 2024-11-21 DIAGNOSIS — R11.2 NAUSEA AND VOMITING, UNSPECIFIED VOMITING TYPE: ICD-10-CM

## 2024-11-21 DIAGNOSIS — I10 ESSENTIAL HYPERTENSION: ICD-10-CM

## 2024-11-21 PROCEDURE — 3074F SYST BP LT 130 MM HG: CPT | Performed by: INTERNAL MEDICINE

## 2024-11-21 PROCEDURE — 1159F MED LIST DOCD IN RCRD: CPT | Performed by: INTERNAL MEDICINE

## 2024-11-21 PROCEDURE — 99213 OFFICE O/P EST LOW 20 MIN: CPT | Performed by: INTERNAL MEDICINE

## 2024-11-21 PROCEDURE — 1036F TOBACCO NON-USER: CPT | Performed by: INTERNAL MEDICINE

## 2024-11-21 PROCEDURE — 1158F ADVNC CARE PLAN TLK DOCD: CPT | Performed by: INTERNAL MEDICINE

## 2024-11-21 PROCEDURE — 1123F ACP DISCUSS/DSCN MKR DOCD: CPT | Performed by: INTERNAL MEDICINE

## 2024-11-21 PROCEDURE — 3008F BODY MASS INDEX DOCD: CPT | Performed by: INTERNAL MEDICINE

## 2024-11-21 PROCEDURE — G2211 COMPLEX E/M VISIT ADD ON: HCPCS | Performed by: INTERNAL MEDICINE

## 2024-11-21 PROCEDURE — 1160F RVW MEDS BY RX/DR IN RCRD: CPT | Performed by: INTERNAL MEDICINE

## 2024-11-21 PROCEDURE — 3078F DIAST BP <80 MM HG: CPT | Performed by: INTERNAL MEDICINE

## 2024-11-21 RX ORDER — PHENTERMINE HYDROCHLORIDE 37.5 MG/1
TABLET ORAL
Qty: 30 TABLET | Refills: 0 | Status: SHIPPED | OUTPATIENT
Start: 2024-11-21

## 2024-11-21 NOTE — PATIENT INSTRUCTIONS
Thank you very much for coming.  It is nice to see you again.    I am glad that you are taking things in stride.  Just continue to work as hard as you are doing with your DIET and EXERCISE.  Even if you are losing just a small amount, at least you are going in the right direction, and you are not gaining weight.    Please continue to maximize DIET.  [employer] diet book, DASH diet, Mediterranean diet for ideas.  You have to make sure that you have choices.  Otherwise, you will get frustrated when you try to just not eat.  Consider joining [employer] or seeing a dietitian, so that you have accountability and some guidance.    Please continue to maximize EXERCISE.  BRISK WALKING 10 minutes in the morning, 10 minutes during the lunch hour, and 10 minutes in the afternoon, for a minimum of 30 minutes a day, every day.  Carve out time to take care of yourself this way.  Includes Sundays!    Please continue to drink lots of fluids throughout the day.  Please continue taking your VITAMIN D and VITAMIN B12 supplements.  These also help with weight loss.  Please continue using your CPAP.  This also helps with weight management.  Please get back on using your CPAP.    I do understand that you had an episode of days worth of nausea and vomiting.  I am glad that you are better.  Please call me back if this happens again.  We might need to do some lab work early.    Next month, just call me with your weight and blood pressure, and I will put in your order for your last installment of PHENTERMINE/ADIPEX.    In FEBRUARY, please do FASTING lab work, then let me see you again.  We will assess your options regarding weight management.  There is more that we can try and do!    Again, please come back in 3 months.  FASTING laboratory examinations, then see me soon after.  Until then, please continue to take care of yourself and your family, and please continue to pray for our recovery from this pandemic.  Take care and God bless.   I hope you have a good Thanksgiving, a good [place], and a happy new year!    Please do not forget to do your MAMMOGRAM follow-up.  Very important.            0  Return in 3 months.  40 minutes please.  FASTING laboratory examinations, then see me soon after for your yearly COMPLETE physical examination.  Reassess options regarding weight management.  Review colonoscopy results.  Review mammogram results.  Reassess vaccination profile.  Reassess mood, energy, function, coordinate with behaviorist.            0

## 2024-11-21 NOTE — PROGRESS NOTES
"Subjective   Patient ID: Cindy Calvillo is a 74 y.o. female who presents for 1 Month FU (Pt in today for 1 month FU).    HPI   The patient was in her usual state of health, when she started to experience the sensation that she was about to throw up.  She then spent the night repeatedly getting up and vomiting initially recently ingested food, then afterwards, gastric contents.  She was tired the next day, but her appetite soon came back, and she was able to recover with increased fluid intake.  Apparently no recurrence of symptoms, currently back to baseline.  No accompanying skin changes.  No change in bowel character or habits.  No dysuria, flank, suprapubic pain.    The patient is a little frustrated, because she does try her best to follow a stringent diet, but she feels that she is not getting anywhere.  She does realize that at least she had not regained any of her previous weight.  ENDOCRINE with no polyuria, polydipsia, polyphagia.  No blurred vision.  No skin, hair, nail changes.  No dramatic weight loss or weight gain.      Has been using her CPAP regularly.  No particular cough, no particular sputum production.  No headache, blurred vision, diplopia.  No dysphagia.  Continues to want to improve quality of life, and does not wish harm to self or others.        Review of Systems  Review of systems as in history of present illness, and otherwise, reviewed separately as well, and was unremarkable/negative/noncontributory.        Objective   /66 (BP Location: Left arm, Patient Position: Sitting, BP Cuff Size: Large adult)   Pulse 80   Ht 1.575 m (5' 2\")   Wt 95 kg (209 lb 8 oz)   SpO2 98%   BMI 38.32 kg/m²     Physical Exam  In good spirits.  Not in distress or diaphoresis.  Alert, oriented x 3.  Amiable.  Not unkempt.  Receptive, cheerful, appropriate, and eager to improve quality of life.  Does not wish harm to self or others.  Appropriate.  Obese perhaps, but remaining independent and " capable.    HEAD pink palpebral conjunctivae, anicteric sclerae.  Mucous membranes moist.  NECK supple, no apparent jugular venous distention.  CARDIOVASCULAR not in distress or diaphoresis.  No bipedal edema.  LUNGS not in distress or diaphoresis.  Not using accessory muscles.  Clear to auscultation bilaterally.  ABDOMEN soft, nontender.  BACK no costovertebral angle tenderness.  EXTREMITIES no clubbing, no cyanosis.  NEURO no facial asymmetry.  No apparent cranial nerve deficits.  Romberg negative.  Ambulating without need of assistance.  No apparent focal weakness.  No tremors.  PSYCH receptive, appropriate, and eager to maintain and improve quality of life.        LABORATORY results from October reviewed, hemoglobin A1c 6.0.  Current body mass index 38.32.  LDL cholesterol 155, preserved liver function.    In April, vitamin D noted 36.        Assessment/Plan   Diagnoses and all orders for this visit:  Class 2 severe obesity due to excess calories with serious comorbidity and body mass index (BMI) of 38.0 to 38.9 in adult  -     Follow Up In Primary Care - Established  -     phentermine (Adipex-P) 37.5 mg tablet; Please take 1 tablet by mouth with BREAKFAST every day, no later.  Lots of fluids throughout the day.  Thank you.  -     Follow Up In Primary Care - Established; Future  -     Comprehensive Metabolic Panel; Future  -     TSH with reflex to Free T4 if abnormal; Future  Nausea and vomiting, unspecified vomiting type  -     Follow Up In Primary Care - Established; Future  -     Comprehensive Metabolic Panel; Future  -     Urinalysis with Reflex Culture and Microscopic; Future  Essential hypertension  -     Follow Up In Primary Care - Established; Future  -     CBC and Auto Differential; Future  -     Comprehensive Metabolic Panel; Future  -     TSH with reflex to Free T4 if abnormal; Future  -     Magnesium; Future  -     Urinalysis with Reflex Culture and Microscopic; Future  Mixed hyperlipidemia  -      phentermine (Adipex-P) 37.5 mg tablet; Please take 1 tablet by mouth with BREAKFAST every day, no later.  Lots of fluids throughout the day.  Thank you.  -     Follow Up In Primary Care - [facility]; Future  -     Comprehensive Metabolic Panel; Future  -     Lipid Panel; Future  Prediabetes  -     phentermine (Adipex-P) 37.5 mg tablet; Please take 1 tablet by mouth with BREAKFAST every day, no later.  Lots of fluids throughout the day.  Thank you.  -     Follow Up In Primary Care - [facility]; Future  -     Comprehensive Metabolic Panel; Future  -     Urinalysis with Reflex Culture and Microscopic; Future  -     Hemoglobin A1C; Future  Obstructive sleep apnea on CPAP  -     Follow Up In Primary Care - [facility]; Future  -     CBC and Auto Differential; Future  -     Comprehensive Metabolic Panel; Future  -     TSH with reflex to Free T4 if abnormal; Future  -     Magnesium; Future  Moderate episode of recurrent major depressive disorder  -     Follow Up In Primary Care - Established; Future  -     Comprehensive Metabolic Panel; Future  -     TSH with reflex to Free T4 if abnormal; Future  Vitamin D deficiency  -     Follow Up In Primary Care - [facility]; Future  -     Comprehensive Metabolic Panel; Future  -     Vitamin D 25-Hydroxy,Total (for eval of Vitamin D levels); Future  Vitamin B12 deficiency  -     Follow Up In Primary Care - Established; Future  -     CBC and Auto Differential; Future  -     Vitamin B12; Future  Age-related osteoporosis without current pathological fracture  -     Follow Up In Primary Care - Established; Future  -     Comprehensive Metabolic Panel; Future  -     TSH with reflex to Free T4 if abnormal; Future  -     Vitamin D 25-Hydroxy,Total (for eval of Vitamin D levels); Future  Abnormal mammogram  -     Follow Up In Primary Care [facility]; Future  Screening for colon cancer  -     Follow Up In Primary Care - [facility]; Future       Thank you very much for coming.  It is nice  to see you again.    I am glad that you are taking things in stride.  Just continue to work as hard as you are doing with your DIET and EXERCISE.  Even if you are losing just a small amount, at least you are going in the right direction, and you are not gaining weight.    Please continue to maximize DIET.  [place] diet book, DASH diet, Mediterranean diet for ideas.  You have to make sure that you have choices.  Otherwise, you will get frustrated when you try to just not eat.  Consider joining [employer] or seeing a dietitian, so that you have accountability and some guidance.    Please continue to maximize EXERCISE.  BRISK WALKING 10 minutes in the morning, 10 minutes during the lunch hour, and 10 minutes in the afternoon, for a minimum of 30 minutes a day, every day.  Carve out time to take care of yourself this way.  Includes Sundays!    Please continue to drink lots of fluids throughout the day.  Please continue taking your VITAMIN D and VITAMIN B12 supplements.  These also help with weight loss.  Please continue using your CPAP.  This also helps with weight management.  Please get back on using your CPAP.    I do understand that you had an episode of days worth of nausea and vomiting.  I am glad that you are better.  Please call me back if this happens again.  We might need to do some lab work early.    Next month, just call me with your weight and blood pressure, and I will put in your order for your last installment of PHENTERMINE/ADIPEX.    In FEBRUARY, please do FASTING lab work, then let me see you again.  We will assess your options regarding weight management.  There is more that we can try and do!    Again, please come back in 3 months.  FASTING laboratory examinations, then see me soon after.  Until then, please continue to take care of yourself and your family, and please continue to pray for our recovery from this pandemic.  Take care and God bless.  I hope you have a good Thanksgiving, a  good Chanel, and a happy new year!    Please do not forget to do your MAMMOGRAM follow-up.  Very important.            0  Return in 3 months.  40 minutes please.  FASTING laboratory examinations, then see me soon after for your yearly COMPLETE physical examination.  Reassess options regarding weight management.  Review colonoscopy results.  Review mammogram results.  Reassess vaccination profile.  Reassess mood, energy, function, coordinate with behaviorist.            0  Reconsider statin with benefit of repeat fasting laboratory examinations in February.            0

## 2024-11-29 ENCOUNTER — APPOINTMENT (OUTPATIENT)
Dept: PRIMARY CARE | Facility: CLINIC | Age: 74
End: 2024-11-29
Payer: COMMERCIAL

## 2024-12-02 ENCOUNTER — HOSPITAL ENCOUNTER (OUTPATIENT)
Dept: RADIOLOGY | Facility: HOSPITAL | Age: 74
Discharge: HOME | End: 2024-12-02
Payer: COMMERCIAL

## 2024-12-02 DIAGNOSIS — R92.8 ABNORMAL MAMMOGRAM: ICD-10-CM

## 2024-12-02 PROCEDURE — 76642 ULTRASOUND BREAST LIMITED: CPT | Mod: RIGHT SIDE | Performed by: RADIOLOGY

## 2024-12-02 PROCEDURE — G0279 TOMOSYNTHESIS, MAMMO: HCPCS | Mod: RIGHT SIDE | Performed by: RADIOLOGY

## 2024-12-02 PROCEDURE — 76642 ULTRASOUND BREAST LIMITED: CPT | Mod: RT

## 2024-12-02 PROCEDURE — 77061 BREAST TOMOSYNTHESIS UNI: CPT | Mod: RT

## 2024-12-02 PROCEDURE — 77065 DX MAMMO INCL CAD UNI: CPT | Mod: RIGHT SIDE | Performed by: RADIOLOGY

## 2024-12-03 ENCOUNTER — DOCUMENTATION (OUTPATIENT)
Dept: SURGERY | Facility: HOSPITAL | Age: 74
End: 2024-12-03
Payer: COMMERCIAL

## 2024-12-03 NOTE — PROGRESS NOTES
Breast navigator:    I spoke with the patient regarding her abnormal mammogram and radiologist recommendations for breast biopsy. Patient was given handouts and questions addressed. She has elected Dr. Amaro as her surgeon. Appointment and biopsy scheduled. Dr. Graves notified.

## 2024-12-05 ENCOUNTER — OFFICE VISIT (OUTPATIENT)
Dept: SURGERY | Facility: CLINIC | Age: 74
End: 2024-12-05
Payer: COMMERCIAL

## 2024-12-05 VITALS — HEIGHT: 62 IN | WEIGHT: 209 LBS | BODY MASS INDEX: 38.46 KG/M2

## 2024-12-05 DIAGNOSIS — R92.8 ABNORMALITY OF RIGHT BREAST ON SCREENING MAMMOGRAM: Primary | ICD-10-CM

## 2024-12-05 PROCEDURE — 99203 OFFICE O/P NEW LOW 30 MIN: CPT | Performed by: SURGERY

## 2024-12-05 PROCEDURE — 1036F TOBACCO NON-USER: CPT | Performed by: SURGERY

## 2024-12-05 PROCEDURE — 1159F MED LIST DOCD IN RCRD: CPT | Performed by: SURGERY

## 2024-12-05 PROCEDURE — 3008F BODY MASS INDEX DOCD: CPT | Performed by: SURGERY

## 2024-12-05 PROCEDURE — 1123F ACP DISCUSS/DSCN MKR DOCD: CPT | Performed by: SURGERY

## 2024-12-05 NOTE — PROGRESS NOTES
Subjective   Patient ID: Cindy Calvillo is a 74 y.o. female who presents for Biopsy Consultation (Right breast).  HPI  74-year-old female postmenopausal referred for abnormal right mammogram.  No self-palpated masses.  Found on screening mammograms.  Scheduled for biopsy tomorrow.  Age of first pregnancy 25 age of menarche is 14 no family history of breast cancer non-smoker  Review of Systems   All other systems reviewed and are negative.      Objective   Physical Exam  Physical Exam  Constitutional:       Appearance: Normal appearance.   HENT:      Head: Normocephalic and atraumatic.      Mouth/Throat:      Pharynx: Oropharynx is clear.   Eyes:      Pupils: Pupils are equal, round, and reactive to light.   Cardiovascular:      Rate and Rhythm: Normal rate and regular rhythm.   Pulmonary:      Effort: Pulmonary effort is normal.      Breath sounds: Normal breath sounds.   Abdominal:      General: Abdomen is flat. Bowel sounds are normal.      Palpations: Abdomen is soft.   Musculoskeletal:         General: Normal range of motion.      Cervical back: Normal range of motion.   Skin:     General: Skin is warm.   Neurological:      General: No focal deficit present.      Mental Status: She is alert. Mental status is at baseline.   Psychiatric:         Mood and Affect: Mood normal.     Examined with chaperone no masses palpated in either breast or axilla  Assessment/Plan   Category 4 right breast mammogram and ultrasound scheduled for biopsy tomorrow we will follow-up after biopsy for pathology review         Jeramie Amaro MD 12/05/24 3:04 PM

## 2024-12-06 ENCOUNTER — HOSPITAL ENCOUNTER (OUTPATIENT)
Dept: RADIOLOGY | Facility: HOSPITAL | Age: 74
Discharge: HOME | End: 2024-12-06
Payer: COMMERCIAL

## 2024-12-06 VITALS
RESPIRATION RATE: 18 BRPM | OXYGEN SATURATION: 99 % | SYSTOLIC BLOOD PRESSURE: 133 MMHG | HEART RATE: 67 BPM | DIASTOLIC BLOOD PRESSURE: 64 MMHG

## 2024-12-06 DIAGNOSIS — R92.8 ABNORMAL MAMMOGRAM: ICD-10-CM

## 2024-12-06 DIAGNOSIS — R92.8 ABNORMAL MAMMOGRAM: Primary | ICD-10-CM

## 2024-12-06 PROCEDURE — 19083 BX BREAST 1ST LESION US IMAG: CPT | Mod: RT

## 2024-12-06 PROCEDURE — A4648 IMPLANTABLE TISSUE MARKER: HCPCS

## 2024-12-06 PROCEDURE — 2720000007 HC OR 272 NO HCPCS

## 2024-12-06 PROCEDURE — 2500000004 HC RX 250 GENERAL PHARMACY W/ HCPCS (ALT 636 FOR OP/ED): Performed by: RADIOLOGY

## 2024-12-06 RX ORDER — LIDOCAINE HYDROCHLORIDE 20 MG/ML
INJECTION, SOLUTION EPIDURAL; INFILTRATION; INTRACAUDAL; PERINEURAL AS NEEDED
Status: COMPLETED | OUTPATIENT
Start: 2024-12-06 | End: 2024-12-06

## 2024-12-06 ASSESSMENT — PAIN SCALES - GENERAL
PAINLEVEL_OUTOF10: 0 - NO PAIN
PAINLEVEL_OUTOF10: 0 - NO PAIN

## 2024-12-06 ASSESSMENT — PAIN - FUNCTIONAL ASSESSMENT
PAIN_FUNCTIONAL_ASSESSMENT: 0-10
PAIN_FUNCTIONAL_ASSESSMENT: 0-10

## 2024-12-06 NOTE — DISCHARGE INSTRUCTIONS
ACTIVITY  You may return to work or other activities the day of your biopsy, providing that these activities do not include heavy lifting or strenuous athletic sports.  Do not shower or bathe for the next 24 hours.  You may take a sponge bath, providing you take care to keep the biopsy area dry.  You may return to all of your normal activities after 48 hours as tolerated.  MEDICATIONS  Avoid aspirin for the next 24 hours. Use Tylenol if needed for discomfort over the next 48 hours.   Continue taking your normal medications as usual, except as noted below.  DRESSINGS  Keep the gauze dressing and your bra on day and night for the next 24 hours.  Remove the dressing 24 hours after the biopsy. You may bathe or shower at that time. The steri strips may come off when wet. Use a band aid if needed. Steri strips should be on no longer than 5 days.  Use ice pack if desired: 1st 24 hours- on 15 minutes, off 15 minutes.  OTHER INSTRUCTIONS  Watch for excessive bleeding, swelling, redness, pain or fever. If any of these occur, contact [facility] Radiology at [phone] between 8am and 5pm, or the Radiologists answering service at [phone] after 5pm. The Mammography department may be reached at [phone] Monday - Friday 7:30 am - 3:30 pm.  **For several days or even a couple of weeks, you may feel mild tenderness, “twinges”, or a small bump at the biopsy puncture site. This is normal. Applying moist heat to the area after 2 days may bring relief. This will disappear with time.    Dr. Biggs [phone]    Briana Bardales Nurse Navigator [phone]

## 2024-12-10 LAB
LABORATORY COMMENT REPORT: NORMAL
PATH REPORT.COMMENTS IMP SPEC: NORMAL
PATH REPORT.FINAL DX SPEC: NORMAL
PATH REPORT.GROSS SPEC: NORMAL
PATH REPORT.RELEVANT HX SPEC: NORMAL
PATH REPORT.TOTAL CANCER: NORMAL

## 2024-12-12 ENCOUNTER — APPOINTMENT (OUTPATIENT)
Dept: SURGERY | Facility: CLINIC | Age: 74
End: 2024-12-12
Payer: COMMERCIAL

## 2024-12-12 DIAGNOSIS — C50.411 MALIGNANT NEOPLASM OF UPPER-OUTER QUADRANT OF RIGHT FEMALE BREAST, UNSPECIFIED ESTROGEN RECEPTOR STATUS: Primary | ICD-10-CM

## 2024-12-12 PROCEDURE — 1123F ACP DISCUSS/DSCN MKR DOCD: CPT | Performed by: SURGERY

## 2024-12-12 PROCEDURE — 1159F MED LIST DOCD IN RCRD: CPT | Performed by: SURGERY

## 2024-12-12 PROCEDURE — 1036F TOBACCO NON-USER: CPT | Performed by: SURGERY

## 2024-12-12 PROCEDURE — 99213 OFFICE O/P EST LOW 20 MIN: CPT | Performed by: SURGERY

## 2024-12-12 NOTE — PROGRESS NOTES
Subjective   Patient ID: Cindy Calvillo is a 74 y.o. female who presents for Follow-up (Biopsy results.).  HPI  Patient returns for pathology review after breast biopsy.  No complaints at this time  Review of Systems   All other systems reviewed and are negative.      Objective   Physical Exam  Physical Exam  Constitutional:       Appearance: Normal appearance.   HENT:      Head: Normocephalic and atraumatic.      Mouth/Throat:      Pharynx: Oropharynx is clear.   Eyes:      Pupils: Pupils are equal, round, and reactive to light.   Cardiovascular:      Rate and Rhythm: Normal rate and regular rhythm.   Pulmonary:      Effort: Pulmonary effort is normal.      Breath sounds: Normal breath sounds.   Abdominal:      General: Abdomen is flat. Bowel sounds are normal.      Palpations: Abdomen is soft.   Musculoskeletal:         General: Normal range of motion.      Cervical back: Normal range of motion.   Skin:     General: Skin is warm.   Neurological:      General: No focal deficit present.      Mental Status: She is alert. Mental status is at baseline.   Psychiatric:         Mood and Affect: Mood normal.     Assessment/Plan   Pathology consistent with infiltrating ductal carcinoma receptor status pending discussed in detail with the patient we will follow-up end of December as she is going out of town and will review receptors at that time and proceed with surgical recommendations patient agreeable with plan         Jeramie Amaro MD 12/12/24 2:06 PM

## 2024-12-30 LAB
LABORATORY COMMENT REPORT: NORMAL
PATH REPORT.COMMENTS IMP SPEC: NORMAL
PATH REPORT.FINAL DX SPEC: NORMAL
PATH REPORT.GROSS SPEC: NORMAL
PATH REPORT.RELEVANT HX SPEC: NORMAL
PATH REPORT.TOTAL CANCER: NORMAL
PATHOLOGY SYNOPTIC REPORT: NORMAL

## 2025-01-02 ENCOUNTER — APPOINTMENT (OUTPATIENT)
Dept: SURGERY | Facility: CLINIC | Age: 75
End: 2025-01-02
Payer: MEDICARE

## 2025-01-02 VITALS — HEIGHT: 62 IN | WEIGHT: 209 LBS | BODY MASS INDEX: 38.46 KG/M2

## 2025-01-02 DIAGNOSIS — C50.411 MALIGNANT NEOPLASM OF UPPER-OUTER QUADRANT OF RIGHT FEMALE BREAST, UNSPECIFIED ESTROGEN RECEPTOR STATUS: Primary | ICD-10-CM

## 2025-01-02 PROCEDURE — 1123F ACP DISCUSS/DSCN MKR DOCD: CPT | Performed by: SURGERY

## 2025-01-02 PROCEDURE — 3008F BODY MASS INDEX DOCD: CPT | Performed by: SURGERY

## 2025-01-02 PROCEDURE — 99213 OFFICE O/P EST LOW 20 MIN: CPT | Performed by: SURGERY

## 2025-01-02 PROCEDURE — 1159F MED LIST DOCD IN RCRD: CPT | Performed by: SURGERY

## 2025-01-02 NOTE — PROGRESS NOTES
Subjective   Patient ID: Cindy Calvillo is a 74 y.o. female who presents for Follow-up.  HPI  Patient returns after a trip no breast related complaints doing well overall no new issues  Review of Systems   All other systems reviewed and are negative.      Objective   Physical Exam  Physical Exam  Constitutional:       Appearance: Normal appearance.   HENT:      Head: Normocephalic and atraumatic.      Mouth/Throat:      Pharynx: Oropharynx is clear.   Eyes:      Pupils: Pupils are equal, round, and reactive to light.   Cardiovascular:      Rate and Rhythm: Normal rate and regular rhythm.   Pulmonary:      Effort: Pulmonary effort is normal.      Breath sounds: Normal breath sounds.   Abdominal:      General: Abdomen is flat. Bowel sounds are normal.      Palpations: Abdomen is soft.   Musculoskeletal:         General: Normal range of motion.      Cervical back: Normal range of motion.   Skin:     General: Skin is warm.   Neurological:      General: No focal deficit present.      Mental Status: She is alert. Mental status is at baseline.   Psychiatric:         Mood and Affect: Mood normal.     Assessment/Plan   Doing well overall we will present in tumor board and call patient regarding axillary sampling will need partial mastectomy questions answered         Jeramie Amaro MD 01/02/25 1:55 PM

## 2025-01-08 ENCOUNTER — APPOINTMENT (OUTPATIENT)
Dept: GASTROENTEROLOGY | Facility: CLINIC | Age: 75
End: 2025-01-08
Payer: COMMERCIAL

## 2025-01-09 ENCOUNTER — APPOINTMENT (OUTPATIENT)
Dept: HEMATOLOGY/ONCOLOGY | Facility: HOSPITAL | Age: 75
End: 2025-01-09
Payer: MEDICARE

## 2025-01-15 ENCOUNTER — APPOINTMENT (OUTPATIENT)
Dept: SURGERY | Facility: CLINIC | Age: 75
End: 2025-01-15
Payer: MEDICARE

## 2025-01-15 VITALS
DIASTOLIC BLOOD PRESSURE: 60 MMHG | BODY MASS INDEX: 39.01 KG/M2 | SYSTOLIC BLOOD PRESSURE: 110 MMHG | WEIGHT: 212 LBS | HEIGHT: 62 IN

## 2025-01-15 DIAGNOSIS — C50.411 MALIGNANT NEOPLASM OF UPPER-OUTER QUADRANT OF RIGHT FEMALE BREAST, UNSPECIFIED ESTROGEN RECEPTOR STATUS: Primary | ICD-10-CM

## 2025-01-15 PROCEDURE — 3074F SYST BP LT 130 MM HG: CPT | Performed by: SURGERY

## 2025-01-15 PROCEDURE — 3008F BODY MASS INDEX DOCD: CPT | Performed by: SURGERY

## 2025-01-15 PROCEDURE — 99213 OFFICE O/P EST LOW 20 MIN: CPT | Performed by: SURGERY

## 2025-01-15 PROCEDURE — 1123F ACP DISCUSS/DSCN MKR DOCD: CPT | Performed by: SURGERY

## 2025-01-15 PROCEDURE — 3078F DIAST BP <80 MM HG: CPT | Performed by: SURGERY

## 2025-01-15 PROCEDURE — 1159F MED LIST DOCD IN RCRD: CPT | Performed by: SURGERY

## 2025-01-15 RX ORDER — METRONIDAZOLE 500 MG/100ML
500 INJECTION, SOLUTION INTRAVENOUS ONCE
OUTPATIENT
Start: 2025-01-15 | End: 2025-01-15

## 2025-01-15 RX ORDER — HEPARIN SODIUM 5000 [USP'U]/ML
5000 INJECTION, SOLUTION INTRAVENOUS; SUBCUTANEOUS ONCE
OUTPATIENT
Start: 2025-01-15 | End: 2025-01-15

## 2025-01-15 RX ORDER — CIPROFLOXACIN 2 MG/ML
400 INJECTION, SOLUTION INTRAVENOUS ONCE
OUTPATIENT
Start: 2025-01-15 | End: 2025-01-15

## 2025-01-15 NOTE — PROGRESS NOTES
Subjective   Patient ID: Cindy Calvillo is a 74 y.o. female who presents for Follow-up (Discuss surgery).  HPI  Patient returns after tumor board presentation for counseling and scheduling surgery overall doing well  Review of Systems   All other systems reviewed and are negative.      Objective   Physical Exam  Physical Exam  Constitutional:       Appearance: Normal appearance.   HENT:      Head: Normocephalic and atraumatic.      Mouth/Throat:      Pharynx: Oropharynx is clear.   Eyes:      Pupils: Pupils are equal, round, and reactive to light.   Cardiovascular:      Rate and Rhythm: Normal rate and regular rhythm.   Pulmonary:      Effort: Pulmonary effort is normal.      Breath sounds: Normal breath sounds.   Abdominal:      General: Abdomen is flat. Bowel sounds are normal.      Palpations: Abdomen is soft.   Musculoskeletal:         General: Normal range of motion.      Cervical back: Normal range of motion.   Skin:     General: Skin is warm.   Neurological:      General: No focal deficit present.      Mental Status: She is alert. Mental status is at baseline.   Psychiatric:         Mood and Affect: Mood normal.     Assessment/Plan   Tumor board recommendation is for right partial mastectomy after [place] localization axillary dissection and sampling not indicated this was discussed in detail with the patient and she consents, breast navigator assisting         Jeramie Amaro MD 01/15/25 12:00 PM

## 2025-01-21 DIAGNOSIS — I10 ESSENTIAL HYPERTENSION: ICD-10-CM

## 2025-01-21 RX ORDER — LISINOPRIL 5 MG/1
TABLET ORAL
Qty: 90 TABLET | Refills: 1 | Status: SHIPPED | OUTPATIENT
Start: 2025-01-21

## 2025-01-21 NOTE — PREPROCEDURE INSTRUCTIONS

## 2025-01-30 ENCOUNTER — HOSPITAL ENCOUNTER (OUTPATIENT)
Dept: RADIOLOGY | Facility: HOSPITAL | Age: 75
Discharge: HOME | End: 2025-01-30
Payer: MEDICARE

## 2025-01-30 ENCOUNTER — HOSPITAL ENCOUNTER (OUTPATIENT)
Dept: CARDIOLOGY | Facility: HOSPITAL | Age: 75
Discharge: HOME | End: 2025-01-30
Payer: MEDICARE

## 2025-01-30 DIAGNOSIS — Z17.0 MALIGNANT NEOPLASM OF RIGHT BREAST IN FEMALE, ESTROGEN RECEPTOR POSITIVE, UNSPECIFIED SITE OF BREAST: ICD-10-CM

## 2025-01-30 DIAGNOSIS — C50.411 MALIGNANT NEOPLASM OF UPPER-OUTER QUADRANT OF RIGHT FEMALE BREAST, UNSPECIFIED ESTROGEN RECEPTOR STATUS: ICD-10-CM

## 2025-01-30 DIAGNOSIS — C50.911 MALIGNANT NEOPLASM OF RIGHT BREAST IN FEMALE, ESTROGEN RECEPTOR POSITIVE, UNSPECIFIED SITE OF BREAST: ICD-10-CM

## 2025-01-30 PROCEDURE — 2500000004 HC RX 250 GENERAL PHARMACY W/ HCPCS (ALT 636 FOR OP/ED): Performed by: RADIOLOGY

## 2025-01-30 PROCEDURE — A4648 IMPLANTABLE TISSUE MARKER: HCPCS

## 2025-01-30 PROCEDURE — 2780000003 HC OR 278 NO HCPCS

## 2025-01-30 PROCEDURE — 19285 PERQ DEV BREAST 1ST US IMAG: CPT | Mod: RT

## 2025-01-30 PROCEDURE — 93005 ELECTROCARDIOGRAM TRACING: CPT

## 2025-01-30 RX ORDER — LIDOCAINE HYDROCHLORIDE 20 MG/ML
INJECTION, SOLUTION EPIDURAL; INFILTRATION; INTRACAUDAL; PERINEURAL AS NEEDED
Status: COMPLETED | OUTPATIENT
Start: 2025-01-30 | End: 2025-01-30

## 2025-01-30 RX ADMIN — LIDOCAINE HYDROCHLORIDE 10 ML: 20 INJECTION, SOLUTION EPIDURAL; INFILTRATION; INTRACAUDAL; PERINEURAL at 08:50

## 2025-01-31 LAB
ATRIAL RATE: 63 BPM
P AXIS: 24 DEGREES
P OFFSET: 199 MS
P ONSET: 150 MS
PR INTERVAL: 136 MS
Q ONSET: 218 MS
QRS COUNT: 10 BEATS
QRS DURATION: 88 MS
QT INTERVAL: 414 MS
QTC CALCULATION(BAZETT): 423 MS
QTC FREDERICIA: 420 MS
R AXIS: 24 DEGREES
T AXIS: 22 DEGREES
T OFFSET: 425 MS
VENTRICULAR RATE: 63 BPM

## 2025-02-02 ENCOUNTER — ANESTHESIA EVENT (OUTPATIENT)
Dept: OPERATING ROOM | Facility: HOSPITAL | Age: 75
End: 2025-02-02
Payer: MEDICARE

## 2025-02-02 RX ORDER — OXYCODONE HYDROCHLORIDE 5 MG/1
5 TABLET ORAL EVERY 4 HOURS PRN
Status: CANCELLED | OUTPATIENT
Start: 2025-02-02

## 2025-02-02 SDOH — HEALTH STABILITY: MENTAL HEALTH: CURRENT SMOKER: 0

## 2025-02-03 ENCOUNTER — HOSPITAL ENCOUNTER (OUTPATIENT)
Facility: HOSPITAL | Age: 75
Setting detail: OUTPATIENT SURGERY
Discharge: HOME | End: 2025-02-03
Attending: SURGERY | Admitting: SURGERY
Payer: MEDICARE

## 2025-02-03 ENCOUNTER — ANESTHESIA (OUTPATIENT)
Dept: OPERATING ROOM | Facility: HOSPITAL | Age: 75
End: 2025-02-03
Payer: MEDICARE

## 2025-02-03 ENCOUNTER — APPOINTMENT (OUTPATIENT)
Dept: RADIOLOGY | Facility: HOSPITAL | Age: 75
End: 2025-02-03
Payer: MEDICARE

## 2025-02-03 VITALS
HEIGHT: 62 IN | BODY MASS INDEX: 38.7 KG/M2 | DIASTOLIC BLOOD PRESSURE: 60 MMHG | HEART RATE: 68 BPM | OXYGEN SATURATION: 95 % | RESPIRATION RATE: 16 BRPM | SYSTOLIC BLOOD PRESSURE: 128 MMHG | TEMPERATURE: 97.5 F | WEIGHT: 210.32 LBS

## 2025-02-03 DIAGNOSIS — Z17.0 MALIGNANT NEOPLASM OF UPPER-OUTER QUADRANT OF RIGHT BREAST IN FEMALE, ESTROGEN RECEPTOR POSITIVE: ICD-10-CM

## 2025-02-03 DIAGNOSIS — F41.8 DEPRESSION WITH ANXIETY: ICD-10-CM

## 2025-02-03 DIAGNOSIS — C50.411 MALIGNANT NEOPLASM OF UPPER-OUTER QUADRANT OF RIGHT BREAST IN FEMALE, ESTROGEN RECEPTOR POSITIVE: ICD-10-CM

## 2025-02-03 DIAGNOSIS — C50.411 MALIGNANT NEOPLASM OF UPPER-OUTER QUADRANT OF RIGHT FEMALE BREAST, UNSPECIFIED ESTROGEN RECEPTOR STATUS: Primary | ICD-10-CM

## 2025-02-03 PROCEDURE — 3600000004 HC OR TIME - INITIAL BASE CHARGE - PROCEDURE LEVEL FOUR: Performed by: SURGERY

## 2025-02-03 PROCEDURE — 2500000001 HC RX 250 WO HCPCS SELF ADMINISTERED DRUGS (ALT 637 FOR MEDICARE OP): Performed by: NURSE ANESTHETIST, CERTIFIED REGISTERED

## 2025-02-03 PROCEDURE — 7100000001 HC RECOVERY ROOM TIME - INITIAL BASE CHARGE: Performed by: SURGERY

## 2025-02-03 PROCEDURE — 7100000010 HC PHASE TWO TIME - EACH INCREMENTAL 1 MINUTE: Performed by: SURGERY

## 2025-02-03 PROCEDURE — 96372 THER/PROPH/DIAG INJ SC/IM: CPT | Performed by: SURGERY

## 2025-02-03 PROCEDURE — 3700000001 HC GENERAL ANESTHESIA TIME - INITIAL BASE CHARGE: Performed by: SURGERY

## 2025-02-03 PROCEDURE — 76098 X-RAY EXAM SURGICAL SPECIMEN: CPT | Mod: RT

## 2025-02-03 PROCEDURE — 3700000002 HC GENERAL ANESTHESIA TIME - EACH INCREMENTAL 1 MINUTE: Performed by: SURGERY

## 2025-02-03 PROCEDURE — 2500000004 HC RX 250 GENERAL PHARMACY W/ HCPCS (ALT 636 FOR OP/ED): Performed by: ANESTHESIOLOGY

## 2025-02-03 PROCEDURE — 2500000004 HC RX 250 GENERAL PHARMACY W/ HCPCS (ALT 636 FOR OP/ED): Performed by: SURGERY

## 2025-02-03 PROCEDURE — 7100000009 HC PHASE TWO TIME - INITIAL BASE CHARGE: Performed by: SURGERY

## 2025-02-03 PROCEDURE — 2720000007 HC OR 272 NO HCPCS: Performed by: SURGERY

## 2025-02-03 PROCEDURE — 2500000005 HC RX 250 GENERAL PHARMACY W/O HCPCS: Performed by: SURGERY

## 2025-02-03 PROCEDURE — 7100000002 HC RECOVERY ROOM TIME - EACH INCREMENTAL 1 MINUTE: Performed by: SURGERY

## 2025-02-03 PROCEDURE — 2500000004 HC RX 250 GENERAL PHARMACY W/ HCPCS (ALT 636 FOR OP/ED): Performed by: NURSE ANESTHETIST, CERTIFIED REGISTERED

## 2025-02-03 PROCEDURE — 19301 PARTIAL MASTECTOMY: CPT | Performed by: SURGERY

## 2025-02-03 PROCEDURE — 88307 TISSUE EXAM BY PATHOLOGIST: CPT | Mod: TC,ELYLAB | Performed by: SURGERY

## 2025-02-03 PROCEDURE — 3600000009 HC OR TIME - EACH INCREMENTAL 1 MINUTE - PROCEDURE LEVEL FOUR: Performed by: SURGERY

## 2025-02-03 RX ORDER — METRONIDAZOLE 500 MG/100ML
500 INJECTION, SOLUTION INTRAVENOUS ONCE
Status: COMPLETED | OUTPATIENT
Start: 2025-02-03 | End: 2025-02-03

## 2025-02-03 RX ORDER — IBUPROFEN 600 MG/1
600 TABLET ORAL EVERY 6 HOURS PRN
Qty: 20 TABLET | Refills: 0 | Status: SHIPPED | OUTPATIENT
Start: 2025-02-03 | End: 2025-02-13

## 2025-02-03 RX ORDER — PROPOFOL 10 MG/ML
INJECTION, EMULSION INTRAVENOUS AS NEEDED
Status: DISCONTINUED | OUTPATIENT
Start: 2025-02-03 | End: 2025-02-03

## 2025-02-03 RX ORDER — HEPARIN SODIUM 5000 [USP'U]/ML
5000 INJECTION, SOLUTION INTRAVENOUS; SUBCUTANEOUS ONCE
Status: COMPLETED | OUTPATIENT
Start: 2025-02-03 | End: 2025-02-03

## 2025-02-03 RX ORDER — FENTANYL CITRATE 50 UG/ML
50 INJECTION, SOLUTION INTRAMUSCULAR; INTRAVENOUS EVERY 5 MIN PRN
Status: DISCONTINUED | OUTPATIENT
Start: 2025-02-03 | End: 2025-02-03 | Stop reason: HOSPADM

## 2025-02-03 RX ORDER — CIPROFLOXACIN 2 MG/ML
400 INJECTION, SOLUTION INTRAVENOUS ONCE
Status: COMPLETED | OUTPATIENT
Start: 2025-02-03 | End: 2025-02-03

## 2025-02-03 RX ORDER — ONDANSETRON HYDROCHLORIDE 2 MG/ML
4 INJECTION, SOLUTION INTRAVENOUS ONCE AS NEEDED
Status: DISCONTINUED | OUTPATIENT
Start: 2025-02-03 | End: 2025-02-03 | Stop reason: HOSPADM

## 2025-02-03 RX ORDER — ALBUTEROL SULFATE 90 UG/1
INHALANT RESPIRATORY (INHALATION) AS NEEDED
Status: DISCONTINUED | OUTPATIENT
Start: 2025-02-03 | End: 2025-02-03

## 2025-02-03 RX ORDER — ONDANSETRON HYDROCHLORIDE 2 MG/ML
INJECTION, SOLUTION INTRAVENOUS AS NEEDED
Status: DISCONTINUED | OUTPATIENT
Start: 2025-02-03 | End: 2025-02-03

## 2025-02-03 RX ORDER — MIDAZOLAM HYDROCHLORIDE 1 MG/ML
INJECTION, SOLUTION INTRAMUSCULAR; INTRAVENOUS AS NEEDED
Status: DISCONTINUED | OUTPATIENT
Start: 2025-02-03 | End: 2025-02-03

## 2025-02-03 RX ORDER — FENTANYL CITRATE 50 UG/ML
INJECTION, SOLUTION INTRAMUSCULAR; INTRAVENOUS AS NEEDED
Status: DISCONTINUED | OUTPATIENT
Start: 2025-02-03 | End: 2025-02-03

## 2025-02-03 RX ORDER — SODIUM CHLORIDE, SODIUM LACTATE, POTASSIUM CHLORIDE, CALCIUM CHLORIDE 600; 310; 30; 20 MG/100ML; MG/100ML; MG/100ML; MG/100ML
100 INJECTION, SOLUTION INTRAVENOUS CONTINUOUS
Status: DISCONTINUED | OUTPATIENT
Start: 2025-02-03 | End: 2025-02-03 | Stop reason: HOSPADM

## 2025-02-03 RX ORDER — LIDOCAINE HYDROCHLORIDE 20 MG/ML
INJECTION, SOLUTION INFILTRATION; PERINEURAL AS NEEDED
Status: DISCONTINUED | OUTPATIENT
Start: 2025-02-03 | End: 2025-02-03

## 2025-02-03 RX ORDER — DIPHENHYDRAMINE HYDROCHLORIDE 50 MG/ML
INJECTION INTRAMUSCULAR; INTRAVENOUS AS NEEDED
Status: DISCONTINUED | OUTPATIENT
Start: 2025-02-03 | End: 2025-02-03

## 2025-02-03 RX ORDER — SODIUM CHLORIDE 0.9 G/100ML
INJECTION, SOLUTION IRRIGATION AS NEEDED
Status: DISCONTINUED | OUTPATIENT
Start: 2025-02-03 | End: 2025-02-03 | Stop reason: HOSPADM

## 2025-02-03 RX ORDER — MEPERIDINE HYDROCHLORIDE 25 MG/ML
12.5 INJECTION INTRAMUSCULAR; INTRAVENOUS; SUBCUTANEOUS EVERY 10 MIN PRN
Status: DISCONTINUED | OUTPATIENT
Start: 2025-02-03 | End: 2025-02-03 | Stop reason: HOSPADM

## 2025-02-03 RX ORDER — SCOPOLAMINE 1 MG/3D
1 PATCH, EXTENDED RELEASE TRANSDERMAL
Status: DISCONTINUED | OUTPATIENT
Start: 2025-02-03 | End: 2025-02-03 | Stop reason: HOSPADM

## 2025-02-03 RX ORDER — LABETALOL HYDROCHLORIDE 5 MG/ML
INJECTION, SOLUTION INTRAVENOUS AS NEEDED
Status: DISCONTINUED | OUTPATIENT
Start: 2025-02-03 | End: 2025-02-03

## 2025-02-03 RX ADMIN — DIPHENHYDRAMINE HYDROCHLORIDE 12.5 MG: 50 INJECTION INTRAMUSCULAR; INTRAVENOUS at 08:38

## 2025-02-03 RX ADMIN — METRONIDAZOLE 500 MG: 5 INJECTION, SOLUTION INTRAVENOUS at 08:00

## 2025-02-03 RX ADMIN — MIDAZOLAM 1 MG: 1 INJECTION INTRAMUSCULAR; INTRAVENOUS at 07:33

## 2025-02-03 RX ADMIN — PROPOFOL 150 MG: 10 INJECTION, EMULSION INTRAVENOUS at 07:39

## 2025-02-03 RX ADMIN — PROPOFOL 50 MG: 10 INJECTION, EMULSION INTRAVENOUS at 07:45

## 2025-02-03 RX ADMIN — DEXAMETHASONE SODIUM PHOSPHATE 8 MG: 4 INJECTION, SOLUTION INTRAMUSCULAR; INTRAVENOUS at 08:10

## 2025-02-03 RX ADMIN — SCOPOLAMINE 1 PATCH: 1 SYSTEM TRANSDERMAL at 07:15

## 2025-02-03 RX ADMIN — FENTANYL CITRATE 50 MCG: 50 INJECTION, SOLUTION INTRAMUSCULAR; INTRAVENOUS at 07:39

## 2025-02-03 RX ADMIN — ONDANSETRON 4 MG: 2 INJECTION, SOLUTION INTRAMUSCULAR; INTRAVENOUS at 08:38

## 2025-02-03 RX ADMIN — SODIUM CHLORIDE, POTASSIUM CHLORIDE, SODIUM LACTATE AND CALCIUM CHLORIDE 100 ML/HR: 600; 310; 30; 20 INJECTION, SOLUTION INTRAVENOUS at 07:15

## 2025-02-03 RX ADMIN — FENTANYL CITRATE 50 MCG: 50 INJECTION, SOLUTION INTRAMUSCULAR; INTRAVENOUS at 07:45

## 2025-02-03 RX ADMIN — LIDOCAINE HYDROCHLORIDE 80 ML: 20 INJECTION, SOLUTION INFILTRATION; PERINEURAL at 07:39

## 2025-02-03 RX ADMIN — CIPROFLOXACIN 400 MG: 400 INJECTION, SOLUTION INTRAVENOUS at 07:16

## 2025-02-03 RX ADMIN — HEPARIN SODIUM 5000 UNITS: 5000 INJECTION INTRAVENOUS; SUBCUTANEOUS at 07:15

## 2025-02-03 RX ADMIN — FENTANYL CITRATE 25 MCG: 50 INJECTION, SOLUTION INTRAMUSCULAR; INTRAVENOUS at 09:07

## 2025-02-03 RX ADMIN — FENTANYL CITRATE 25 MCG: 50 INJECTION, SOLUTION INTRAMUSCULAR; INTRAVENOUS at 09:12

## 2025-02-03 RX ADMIN — FENTANYL CITRATE 25 MCG: 50 INJECTION, SOLUTION INTRAMUSCULAR; INTRAVENOUS at 08:57

## 2025-02-03 RX ADMIN — FENTANYL CITRATE 25 MCG: 50 INJECTION, SOLUTION INTRAMUSCULAR; INTRAVENOUS at 07:59

## 2025-02-03 RX ADMIN — PROPOFOL 50 MG: 10 INJECTION, EMULSION INTRAVENOUS at 07:59

## 2025-02-03 RX ADMIN — FENTANYL CITRATE 25 MCG: 50 INJECTION, SOLUTION INTRAMUSCULAR; INTRAVENOUS at 08:22

## 2025-02-03 RX ADMIN — FENTANYL CITRATE 25 MCG: 50 INJECTION, SOLUTION INTRAMUSCULAR; INTRAVENOUS at 09:02

## 2025-02-03 RX ADMIN — ALBUTEROL SULFATE 2 PUFF: 90 AEROSOL, METERED RESPIRATORY (INHALATION) at 07:50

## 2025-02-03 RX ADMIN — LABETALOL HYDROCHLORIDE 5 MG: 5 INJECTION, SOLUTION INTRAVENOUS at 08:57

## 2025-02-03 RX ADMIN — MIDAZOLAM 1 MG: 1 INJECTION INTRAMUSCULAR; INTRAVENOUS at 07:36

## 2025-02-03 ASSESSMENT — PAIN - FUNCTIONAL ASSESSMENT
PAIN_FUNCTIONAL_ASSESSMENT: 0-10

## 2025-02-03 ASSESSMENT — COLUMBIA-SUICIDE SEVERITY RATING SCALE - C-SSRS
2. HAVE YOU ACTUALLY HAD ANY THOUGHTS OF KILLING YOURSELF?: NO
6. HAVE YOU EVER DONE ANYTHING, STARTED TO DO ANYTHING, OR PREPARED TO DO ANYTHING TO END YOUR LIFE?: NO
1. IN THE PAST MONTH, HAVE YOU WISHED YOU WERE DEAD OR WISHED YOU COULD GO TO SLEEP AND NOT WAKE UP?: NO

## 2025-02-03 ASSESSMENT — PAIN SCALES - GENERAL
PAINLEVEL_OUTOF10: 0 - NO PAIN
PAINLEVEL_OUTOF10: 1
PAINLEVEL_OUTOF10: 0 - NO PAIN
PAINLEVEL_OUTOF10: 1
PAINLEVEL_OUTOF10: 0 - NO PAIN
PAINLEVEL_OUTOF10: 0 - NO PAIN

## 2025-02-03 ASSESSMENT — PAIN DESCRIPTION - DESCRIPTORS
DESCRIPTORS: DULL
DESCRIPTORS: OTHER (COMMENT);DULL

## 2025-02-03 NOTE — ANESTHESIA POSTPROCEDURE EVALUATION
Patient: Cindy Calvillo    Procedure Summary       Date: 02/03/25 Room / Location: [facility] / [facility]    Anesthesia Start: 0733 Anesthesia Stop: 0914    Procedure: MASTECTOMY, PARTIAL (Right) Diagnosis:       Malignant neoplasm of upper-outer quadrant of right female breast, unspecified estrogen receptor status      (Malignant neoplasm of upper-outer quadrant of right female breast, unspecified estrogen receptor status [C50.411])    Surgeons: Jeramie SANDY MD Responsible Provider: Jose Martin Conner MD    Anesthesia Type: general ASA Status: 3            Anesthesia Type: general    Vitals Value Taken Time   /59 02/03/25 0912   Temp 36 02/03/25 0914   Pulse 64 02/03/25 0912   Resp 9 02/03/25 0912   SpO2 100 % 02/03/25 0912   Vitals shown include unfiled device data.    Anesthesia Post Evaluation    Patient location during evaluation: PACU  Patient participation: complete - patient participated  Level of consciousness: sleepy but conscious  Pain management: adequate  Airway patency: patent  Cardiovascular status: acceptable and blood pressure returned to baseline  Respiratory status: acceptable  Hydration status: acceptable  Postoperative Nausea and Vomiting: none        No notable events documented.

## 2025-02-03 NOTE — DISCHARGE INSTRUCTIONS
General Anesthesia Discharge Instructions  What care is needed at home?  Ask your doctor what you need to do when you go home. Make sure you ask questions if you do not understand what the doctor says.  Your doctor may give you drugs to prevent or treat an upset stomach from the anesthetic. Take them as ordered.  If your throat is sore, suck on ice chips or popsicles to ease throat pain.  Put 2 to 3 pillows under your head and back when you lie down to help you breathe easier.  For the first 24 to 48 hours:  Do not operate heavy or dangerous machinery.  Do not make major decisions or sign important papers. You may not be able to think clearly.  Avoid beer, wine, or mixed drinks.  You are at a higher risk of falling for at least 24 hours after general anesthesia.  Take extra care when you get up.  Do not change positions quickly.  Do not rush when you need to go to the bathroom or to answer the phone.  Ask for help if you feel unsteady when you try to walk.  Wear shoes with non-slip soles and low heels.  What follow-up care is needed?  Your doctor may ask you to come back to the office to check on your progress. Be sure to keep these visits.  If you have stitches that do not dissolve or staples, you will need to have them removed. Your doctor will want to do this in 1 to 2 weeks. If the doctor used skin glue, the glue will fall off on its own.  What drugs may be needed?  The doctor may order drugs to:  Help with pain  Treat an upset stomach or throwing up  Will physical activity be limited?  You will not be allowed to drive right away after the procedure. Ask a family member or a friend to drive you home.  Avoid trying to get out of bed without help until you are sure of your balance.  You may have to limit your activity. Talk to your doctor about if you need to limit how much you lift or limit exercise after your procedure.  What changes to diet are needed?  Start with a light diet when you are fully awake. This  includes things that are easy to swallow like soups, pudding, jello, toast, and eggs. Slowly progress to your normal diet.  What problems could happen?  Low blood pressure  Breathing problems  Upset stomach or throwing up  Dizziness  Blood clots  Infection  When do I need to call the doctor?  Trouble breathing  Upset stomach or throwing up more than 3 times in the next 2 days  Dizziness

## 2025-02-03 NOTE — OP NOTE
MASTECTOMY, PARTIAL (R) Operative Note     Date: 2/3/2025  OR Location: [facility]    Name: Cindy Calvillo, : 1950, Age: 74 y.o., MRN: [id number], Sex: female    Diagnosis  Pre-op Diagnosis      * Malignant neoplasm of upper-outer quadrant of right female breast, unspecified estrogen receptor status [C50.411] Post-op Diagnosis     * Malignant neoplasm of upper-outer quadrant of right female breast, unspecified estrogen receptor status [C50.411]     Procedures  MASTECTOMY, PARTIAL  [address] - [place] MASTECTOMY PARTIAL      Surgeons      * Jeramie Amaro V - Primary    Resident/Fellow/Other Assistant:  Surgeons and Role:  * No surgeons found with a matching role *    Staff:   Circulator: Wang Obrien Person: Tete    Anesthesia Staff: Anesthesiologist: Jose Martin Conner MD  CRNA: JEFERSON Siddiqui-CRNA    Procedure Summary  Anesthesia: General  ASA: III  Estimated Blood Loss: Minimal mL  Intra-op Medications:   Administrations occurring from 0715 to 0900 on 25:   Medication Name Total Dose   sodium chloride 0.9 % irrigation solution 3,000 mL   albuterol inhaler 2 puff   dexAMETHasone (Decadron) 4 mg/mL 8 mg   diphenhydrAMINE 50 mg/mL 12.5 mg   fentaNYL PF 0.05 mg/mL 150 mcg   lactated Ringer's infusion 175 mL   lidocaine (Xylocaine) 2 % 80 mL   midazolam (Versed) 1 mg/1 mL 2 mg   ondansetron 2 mg/mL 4 mg   propofol (Diprivan) injection 10 mg/mL 250 mg   scopolamine (Transderm-Scop) patch 1 patch 1 patch              Anesthesia Record               Intraprocedure I/O Totals       None           Specimen:   ID Type Source Tests Collected by Time   1 : RIGHT PARTIAL MASTECTOMY Tissue BREAST, EXCISION OF MASS RIGHT SURGICAL PATHOLOGY EXAM Jeramie SANDY MD 2/3/2025 0829                 Drains and/or Catheters: * None in log *    Tourniquet Times:         Implants:     Findings: Magseed and biopsy clip in specimen mammography    Indications: Cindy Calvillo is an 74 y.o. female who is having surgery for Malignant neoplasm  of upper-outer quadrant of right female breast, unspecified estrogen receptor status [C50.411].     The patient was seen in the preoperative area. The risks, benefits, complications, treatment options, non-operative alternatives, expected recovery and outcomes were discussed with the patient. The possibilities of reaction to medication, pulmonary aspiration, injury to surrounding structures, bleeding, recurrent infection, the need for additional procedures, failure to diagnose a condition, and creating a complication requiring transfusion or operation were discussed with the patient. The patient concurred with the proposed plan, giving informed consent.  The site of surgery was properly noted/marked if necessary per policy. The patient has been actively warmed in preoperative area. Preoperative antibiotics have been ordered and given within 1 hours of incision. Venous thrombosis prophylaxis have been ordered including bilateral sequential compression devices and chemical prophylaxis    Procedure Details: Patient was brought to the OR laid supine.  Preoperative huddle was performed and all team members participated.  Patient was then placed under general anesthesia.  Right breast chest wall and axilla were prepped and draped in the standard surgical fashion.  Timeout procedures were observed and we elected to proceed at this time    Utilizing the Magseed detector a hotspot was localized and a curvilinear skin incision made and flaps were raised using cautery.  Utilizing the detector to guide the dissection partial mastectomy was performed using cautery down to the chest wall.  Hemostasis achieved with cautery specimen was then excised using cautery and the margins were marked with the supplied ink except the the red and the orange were interchanged with the red being the lateral  margin and the orange being the superior margin.  This was noted in the chart.  Specimen mammography confirmed the presence of biopsy and  Magseed.  The deep margin at the chest wall was marked with 2 clips.  Wound was irrigated closed using 3-0 Vicryl for Monocryl and skin adhesive patient tolerated procedure well discussed with family in detail  Complications:  None; patient tolerated the procedure well.    Disposition: PACU - hemodynamically stable.  Condition: stable     This procedure was not performed to treat breast cancer through sentinel node biopsy              Additional Details:     Attending Attestation:     Jeramie Amaro V  Phone Number: [phone]

## 2025-02-03 NOTE — ANESTHESIA PREPROCEDURE EVALUATION
Cindy Calvillo is a 74 y.o. female here for:    MASTECTOMY, PARTIAL  With Jeramie SANDY MD  Pre-Op Diagnosis Codes:      * Breast cancer [C50.411]    Relevant Problems   Cardiac   (+) Essential hypertension   (+) Mixed hyperlipidemia      Neuro   (+) Depression with anxiety   (+) Moderate episode of recurrent major depressive disorder      GI   (+) GERD (gastroesophageal reflux disease)      Endocrine   (+) Class 2 severe obesity due to excess calories with serious comorbidity and body mass index (BMI) of 38.0 to 38.9 in adult      Musculoskeletal   (+) Degenerative cervical spinal stenosis   (+) Left knee DJD      HEENT   (+) Recurrent maxillary sinusitis      ID   (+) Yeast vaginitis      Skin   (+) Eczema      GYN   (+) Malignant neoplasm of upper-outer quadrant of right female breast       Lab Results   Component Value Date    HGB 14.1 10/19/2024    HCT 43.3 10/19/2024    WBC 7.6 10/19/2024     10/19/2024     10/19/2024    K 4.3 10/19/2024     10/19/2024    CREATININE 0.72 10/19/2024    BUN 23 10/19/2024       Social History     Tobacco Use   Smoking Status Never   Smokeless Tobacco Never       Allergies   Allergen Reactions   • Atorvastatin Other     myalgia   • Codeine Unknown   • Doxycycline Unknown   • Montelukast Unknown   • Penicillins Unknown       Current Outpatient Medications   Medication Instructions   • albuterol 90 mcg/actuation inhaler 2 puffs, inhalation, Every 4 hours PRN   • alendronate (FOSAMAX) 35 mg, oral, Every 7 days   • cetirizine (ZYRTEC) 10 mg, oral, Daily PRN   • cholecalciferol (Vitamin D-3) 50,000 unit capsule PLEASE TAKE 1 CAPSULE BY MOUTH SUNDAYS WITH LUNCH AND A FULL GLASS OF WATER. THANK YOU.   • cyanocobalamin (VITAMIN B-12) 1,000 mcg, Daily RT   • famotidine (Pepcid) 20 mg tablet Please take 1 tablet by mouth before breakfast time, and again 1 tablet by mouth before suppertime.  Please take twice a day, even if you are not going to eat.  Thank you.   •  fluconazole (Diflucan) 150 mg tablet Please take 1 tablet by mouth every other day for a total of 3 doses.  Thank you.  Lots of fluids throughout the day.   • fluticasone (Flonase) 50 mcg/actuation nasal spray Use 2 sprays into each nostril after breakfast, and use 2 sprays into each nostril after supper.  Thank you.   • lisinopril 5 mg tablet PLEASE TAKE 1 TABLET BY MOUTH WITH SUPPER EVERY EVENING. THANK YOU.   • metoprolol succinate XL (Toprol-XL) 25 mg 24 hr tablet Please take ONLY HALF TABLET by mouth with SUPPER every evening.  Do not crush or chew.  Thank you.   • multivitamin tablet 1 tablet, Daily   • olopatadine (Patanol) 0.1 % ophthalmic solution INSTILL 1 DROP INTO BOTH EYES TWICE DAILY AS NEEDED AT 6-8 HOUR INTERVALS.   • phentermine (Adipex-P) 37.5 mg tablet Please take 1 tablet by mouth with BREAKFAST every day, no later.  Lots of fluids throughout the day.  Thank you.   • saccharomyces boulardii (FLORASTOR) 250 mg, 2 times daily   • Tiadylt  mg, oral, Daily   • tiZANidine (Zanaflex) 2 mg tablet Please take 1 tablet by mouth every 6 hours as needed for musculoskeletal pain.  Watch out for daytime sleepiness.  No driving if drowsy.  Do not take with alcohol.  Thank you.   • triamcinolone (Kenalog) 0.1 % ointment Please apply thin layer to rashes 2 times a day.  Do not cover with bandage.  Drink lots of fluids throughout the day.  Thank you.   • venlafaxine XR (EFFEXOR XR) 37.5 mg, oral, Daily, Do not crush or chew.   • venlafaxine XR (EFFEXOR-XR) 150 mg, oral, Daily, Do not crush or chew.       Past Surgical History:   Procedure Laterality Date   • BREAST BIOPSY     • COLONOSCOPY     • COLONOSCOPY W/ POLYPECTOMY N/A    • HYSTERECTOMY     • KNEE SURGERY N/A    • MENISCECTOMY Left    • ROTATOR CUFF REPAIR Left     x 2   • SINUS SURGERY N/A    • TONSILLECTOMY N/A    • WISDOM TOOTH EXTRACTION N/A        Family History   Problem Relation Name Age of Onset   • Cervical cancer Mother     • Cancer Father          NPO Details:  No data recorded    Physical Exam    Airway  Mallampati: II  TM distance: >3 FB  Neck ROM: full     Cardiovascular    Dental    Pulmonary    Abdominal        Anesthesia Plan    History of general anesthesia?: yes  History of complications of general anesthesia?: no    ASA 3     general     The patient is not a current smoker.    intravenous induction   Postoperative administration of opioids is intended.  Anesthetic plan and risks discussed with patient.    Plan discussed with CRNA.

## 2025-02-03 NOTE — ANESTHESIA PROCEDURE NOTES
Airway  Date/Time: 2/3/2025 7:47 AM  Urgency: elective    Airway not difficult    Staffing  Performed: CRNA   Authorized by: Jose Martin oCnner MD    Performed by: JEFERSON Siddiqui-HAZEL  Patient location during procedure: OR    Indications and Patient Condition  Indications for airway management: anesthesia      Final Airway Details  Final airway type: supraglottic airway      Successful airway: Size 4     Number of attempts at approach: 2  Ventilation between attempts: none

## 2025-02-10 LAB
LAB AP BLOCK FOR ADDITIONAL STUDIES: NORMAL
LABORATORY COMMENT REPORT: NORMAL
PATH REPORT.FINAL DX SPEC: NORMAL
PATH REPORT.GROSS SPEC: NORMAL
PATH REPORT.RELEVANT HX SPEC: NORMAL
PATH REPORT.TOTAL CANCER: NORMAL
PATHOLOGY SYNOPTIC REPORT: NORMAL

## 2025-02-13 ENCOUNTER — APPOINTMENT (OUTPATIENT)
Dept: SURGERY | Facility: CLINIC | Age: 75
End: 2025-02-13
Payer: MEDICARE

## 2025-02-13 VITALS
BODY MASS INDEX: 38.46 KG/M2 | SYSTOLIC BLOOD PRESSURE: 139 MMHG | DIASTOLIC BLOOD PRESSURE: 76 MMHG | HEART RATE: 76 BPM | OXYGEN SATURATION: 98 % | HEIGHT: 62 IN | WEIGHT: 209 LBS

## 2025-02-13 DIAGNOSIS — Z12.31 ENCOUNTER FOR SCREENING MAMMOGRAM FOR MALIGNANT NEOPLASM OF BREAST: ICD-10-CM

## 2025-02-13 DIAGNOSIS — C50.411 MALIGNANT NEOPLASM OF UPPER-OUTER QUADRANT OF RIGHT FEMALE BREAST, UNSPECIFIED ESTROGEN RECEPTOR STATUS: Primary | ICD-10-CM

## 2025-02-13 PROCEDURE — 1159F MED LIST DOCD IN RCRD: CPT | Performed by: SURGERY

## 2025-02-13 PROCEDURE — 3075F SYST BP GE 130 - 139MM HG: CPT | Performed by: SURGERY

## 2025-02-13 PROCEDURE — 1123F ACP DISCUSS/DSCN MKR DOCD: CPT | Performed by: SURGERY

## 2025-02-13 PROCEDURE — 99024 POSTOP FOLLOW-UP VISIT: CPT | Performed by: SURGERY

## 2025-02-13 PROCEDURE — 1036F TOBACCO NON-USER: CPT | Performed by: SURGERY

## 2025-02-13 PROCEDURE — 3078F DIAST BP <80 MM HG: CPT | Performed by: SURGERY

## 2025-02-13 PROCEDURE — 3008F BODY MASS INDEX DOCD: CPT | Performed by: SURGERY

## 2025-02-13 NOTE — PROGRESS NOTES
Status post partial mastectomy of the right minimal pain doing well overall    Incision healing well    Pathology reviewed in detail with the patient margins are negative stage I T1b    Will refer to oncology for adjuvant treatment recommendations    Follow-up mammogram right side only 6 months see back after

## 2025-02-20 ENCOUNTER — APPOINTMENT (OUTPATIENT)
Dept: PRIMARY CARE | Facility: CLINIC | Age: 75
End: 2025-02-20
Payer: COMMERCIAL

## 2025-02-21 DIAGNOSIS — I10 ESSENTIAL HYPERTENSION: ICD-10-CM

## 2025-02-22 RX ORDER — DILTIAZEM HYDROCHLORIDE 120 MG/1
120 CAPSULE, EXTENDED RELEASE ORAL DAILY
Qty: 90 CAPSULE | Refills: 1 | Status: SHIPPED | OUTPATIENT
Start: 2025-02-22

## 2025-02-25 ENCOUNTER — OFFICE VISIT (OUTPATIENT)
Dept: HEMATOLOGY/ONCOLOGY | Facility: CLINIC | Age: 75
End: 2025-02-25
Payer: MEDICARE

## 2025-02-25 VITALS
BODY MASS INDEX: 39.23 KG/M2 | RESPIRATION RATE: 18 BRPM | DIASTOLIC BLOOD PRESSURE: 76 MMHG | HEIGHT: 61 IN | OXYGEN SATURATION: 96 % | SYSTOLIC BLOOD PRESSURE: 138 MMHG | TEMPERATURE: 96.1 F | HEART RATE: 76 BPM | WEIGHT: 207.8 LBS

## 2025-02-25 DIAGNOSIS — C50.411 MALIGNANT NEOPLASM OF UPPER-OUTER QUADRANT OF RIGHT FEMALE BREAST, UNSPECIFIED ESTROGEN RECEPTOR STATUS: ICD-10-CM

## 2025-02-25 PROCEDURE — 3078F DIAST BP <80 MM HG: CPT | Performed by: STUDENT IN AN ORGANIZED HEALTH CARE EDUCATION/TRAINING PROGRAM

## 2025-02-25 PROCEDURE — 1159F MED LIST DOCD IN RCRD: CPT | Performed by: STUDENT IN AN ORGANIZED HEALTH CARE EDUCATION/TRAINING PROGRAM

## 2025-02-25 PROCEDURE — 1126F AMNT PAIN NOTED NONE PRSNT: CPT | Performed by: STUDENT IN AN ORGANIZED HEALTH CARE EDUCATION/TRAINING PROGRAM

## 2025-02-25 PROCEDURE — 1036F TOBACCO NON-USER: CPT | Performed by: STUDENT IN AN ORGANIZED HEALTH CARE EDUCATION/TRAINING PROGRAM

## 2025-02-25 PROCEDURE — 1123F ACP DISCUSS/DSCN MKR DOCD: CPT | Performed by: STUDENT IN AN ORGANIZED HEALTH CARE EDUCATION/TRAINING PROGRAM

## 2025-02-25 PROCEDURE — 99205 OFFICE O/P NEW HI 60 MIN: CPT | Performed by: STUDENT IN AN ORGANIZED HEALTH CARE EDUCATION/TRAINING PROGRAM

## 2025-02-25 PROCEDURE — 99215 OFFICE O/P EST HI 40 MIN: CPT | Performed by: STUDENT IN AN ORGANIZED HEALTH CARE EDUCATION/TRAINING PROGRAM

## 2025-02-25 PROCEDURE — 3008F BODY MASS INDEX DOCD: CPT | Performed by: STUDENT IN AN ORGANIZED HEALTH CARE EDUCATION/TRAINING PROGRAM

## 2025-02-25 PROCEDURE — 3075F SYST BP GE 130 - 139MM HG: CPT | Performed by: STUDENT IN AN ORGANIZED HEALTH CARE EDUCATION/TRAINING PROGRAM

## 2025-02-25 ASSESSMENT — COLUMBIA-SUICIDE SEVERITY RATING SCALE - C-SSRS
6. HAVE YOU EVER DONE ANYTHING, STARTED TO DO ANYTHING, OR PREPARED TO DO ANYTHING TO END YOUR LIFE?: NO
1. IN THE PAST MONTH, HAVE YOU WISHED YOU WERE DEAD OR WISHED YOU COULD GO TO SLEEP AND NOT WAKE UP?: NO
2. HAVE YOU ACTUALLY HAD ANY THOUGHTS OF KILLING YOURSELF?: NO

## 2025-02-25 ASSESSMENT — ENCOUNTER SYMPTOMS
OCCASIONAL FEELINGS OF UNSTEADINESS: 0
LOSS OF SENSATION IN FEET: 0
DEPRESSION: 1

## 2025-02-25 ASSESSMENT — PATIENT HEALTH QUESTIONNAIRE - PHQ9
1. LITTLE INTEREST OR PLEASURE IN DOING THINGS: SEVERAL DAYS
10. IF YOU CHECKED OFF ANY PROBLEMS, HOW DIFFICULT HAVE THESE PROBLEMS MADE IT FOR YOU TO DO YOUR WORK, TAKE CARE OF THINGS AT HOME, OR GET ALONG WITH OTHER PEOPLE: SOMEWHAT DIFFICULT
SUM OF ALL RESPONSES TO PHQ9 QUESTIONS 1 AND 2: 2
2. FEELING DOWN, DEPRESSED OR HOPELESS: SEVERAL DAYS

## 2025-02-25 ASSESSMENT — PAIN SCALES - GENERAL: PAINLEVEL_OUTOF10: 0-NO PAIN

## 2025-02-25 NOTE — PROGRESS NOTES
Patient ID: Cindy Calvillo is a 74 y.o. female.    The patient presents to clinic today for her history of breast cancer.    Cancer Staging   No matching staging information was found for the patient.        Diagnostic/Therapeutic History:    - 10/31/2024: Right breast focal asymmetry. Diagnostic mammogram recommended.  Ultrasound may be indicated- BIRADS category 0    - 2024: follow up diagnostic imaging: Right breast indeterminate 9 mm heterogeneously hyperechoic lesion located at the 9:30 position 5 cm from nipple edge. US axilla showed 2 morphololgically normal  lymph nodes     - 2024: Right breast  mass ultrasound guided core needle biopsy showed IDC G2 ER: 95%, W[place]: 95%, HER2 negative 1+     -2025: Dr Garcia performed a right breast PM with path showing grade 2 , 9 mm focus of IDC. All margins are negative. Nodes not assessed. Final stage pT1bNx: ER: 95%, W[place]: 95%, HER2 negative (1+)    History of Present Illness (HPI)/Interval History:  Presenting to discuss treatment options    She denies any fevers or chills.    She denies any chest pain or breathing issues.     She denies any vision changes or headache issues, dizziness, loss of balance, neuropathy and no falls.     She denies any new or unexplained bone aches or pains.    She denies any skin lesions or masses, hair loss, nail changes, or oral sores.    She reports a normal appetite and normal bowel movements. Her weight is stable.     Does have intermittent fatigue and insomina    PMH: anxiety, HTN, hx of Gyn cancer, uterine? S/p hysterectomy    Allergies: Reviewed     Family hx: mother cervical cancer?, father with head and neck cancer?    Reproductive hx:  menarche 15, menopause 53, OCP x couple years, HRT: no,         Review of Systems:  14-point ROS otherwise negative, as per HPI.    Past Medical History:   Diagnosis Date    Anxiety     Arthritis     Depression     Hypertension     [place] on CPAP     states she uses sometimes    PONV  "(postoperative nausea and vomiting)     Seasonal allergies     Skin disorder     eczema in the past    Sleep apnea     CPAP used \"sometimes\"    Uterine cancer (Multi)     Wears glasses        Past Surgical History:   Procedure Laterality Date    BREAST BIOPSY      BREAST LUMPECTOMY Right     COLONOSCOPY      COLONOSCOPY W/ POLYPECTOMY N/A     HYSTERECTOMY      KNEE SURGERY N/A     MENISCECTOMY Left     ROTATOR CUFF REPAIR Left     x 2    SINUS SURGERY N/A     TONSILLECTOMY N/A     WISDOM TOOTH EXTRACTION N/A        Social History     Socioeconomic History    Marital status:    Tobacco Use    Smoking status: Never     Passive exposure: Past    Smokeless tobacco: Never   Vaping Use    Vaping status: Never Used   Substance and Sexual Activity    Alcohol use: Never    Drug use: Never    Sexual activity: Defer     Comment: hysterectomy     Social Drivers of Health     Financial Resource Strain: Low Risk  (1/4/2024)    Overall Financial Resource Strain (CARDIA)     Difficulty of Paying Living Expenses: Not hard at all   Food Insecurity: No Food Insecurity (1/4/2024)    Hunger Vital Sign     Worried About Running Out of Food in the Last Year: Never true     Ran Out of Food in the Last Year: Never true   Transportation Needs: No Transportation Needs (1/4/2024)    PRAPARE - Transportation     Lack of Transportation (Medical): No     Lack of Transportation (Non-Medical): No   Physical Activity: Insufficiently Active (1/4/2024)    Exercise Vital Sign     Days of Exercise per Week: 7 days     Minutes of Exercise per Session: 20 min   Stress: No Stress Concern Present (1/4/2024)    [employer] [place] [employer] - Occupational Stress Questionnaire     Feeling of Stress : Only a little   Social Connections: Moderately Isolated (1/4/2024)    Social Connection and Isolation Panel [NHANES]     Frequency of Communication with Friends and Family: More than three times a week     Frequency of Social Gatherings with " Friends and Family: More than three times a week     Attends Taoism Services: Never     Active Member of Clubs or Organizations: Yes     Attends Club or Organization Meetings: 1 to 4 times per year     Marital Status:    Intimate Partner Violence: Not At Risk (1/4/2024)    Humiliation, Afraid, Rape, and Kick questionnaire     Fear of Current or Ex-Partner: No     Emotionally Abused: No     Physically Abused: No     Sexually Abused: No   Housing Stability: Low Risk  (1/4/2024)    Housing Stability Vital Sign     Unable to Pay for Housing in the Last Year: No     Number of Places Lived in the Last Year: 1     Unstable Housing in the Last Year: No       Allergies   Allergen Reactions    Atorvastatin Other     myalgia    Codeine Unknown    Doxycycline Unknown    Montelukast Unknown    Oxycodone Itching    Penicillins Unknown         Current Outpatient Medications:     cetirizine (ZyrTEC) 10 mg tablet, Take 1 tablet (10 mg) by mouth once daily as needed., Disp: , Rfl:     cyanocobalamin (Vitamin B-12) 1,000 mcg tablet, Take 1 tablet (1,000 mcg) by mouth once daily., Disp: , Rfl:     dilTIAZem ER (Tiazac) 120 mg 24 hr capsule, TAKE 1 CAPSULE BY MOUTH ONCE DAILY., Disp: 90 capsule, Rfl: 1    lisinopril 5 mg tablet, PLEASE TAKE 1 TABLET BY MOUTH WITH SUPPER EVERY EVENING. THANK YOU., Disp: 90 tablet, Rfl: 1    albuterol 90 mcg/actuation inhaler, Inhale 2 puffs every 4 hours if needed for shortness of breath or wheezing. (Patient not taking: Reported on 2/25/2025), Disp: 18 g, Rfl: 2    alendronate (Fosamax) 35 mg tablet, Take 1 tablet (35 mg) by mouth every 7 days. (Patient not taking: Reported on 2/25/2025), Disp: 13 tablet, Rfl: 1    cholecalciferol (Vitamin D-3) 50,000 unit capsule, PLEASE TAKE 1 CAPSULE BY MOUTH SUNDAYS WITH LUNCH AND A FULL GLASS OF WATER. THANK YOU. (Patient not taking: Reported on 2/25/2025), Disp: 13 capsule, Rfl: 0    famotidine (Pepcid) 20 mg tablet, Please take 1 tablet by mouth  before breakfast time, and again 1 tablet by mouth before suppertime.  Please take twice a day, even if you are not going to eat.  Thank you. (Patient not taking: Reported on 12/5/2024), Disp: 180 tablet, Rfl: 1    fluconazole (Diflucan) 150 mg tablet, Please take 1 tablet by mouth every other day for a total of 3 doses.  Thank you.  Lots of fluids throughout the day. (Patient not taking: Reported on 12/5/2024), Disp: 3 tablet, Rfl: 0    fluticasone (Flonase) 50 mcg/actuation nasal spray, Use 2 sprays into each nostril after breakfast, and use 2 sprays into each nostril after supper.  Thank you. (Patient taking differently: 2 times a day as needed. Use 2 sprays into each nostril after breakfast, and use 2 sprays into each nostril after supper.  Thank you.), Disp: 16 mL, Rfl: 5    metoprolol succinate XL (Toprol-XL) 25 mg 24 hr tablet, Please take ONLY HALF TABLET by mouth with SUPPER every evening.  Do not crush or chew.  Thank you. (Patient not taking: Reported on 2/25/2025), Disp: 45 tablet, Rfl: 1    multivitamin tablet, Take 1 tablet by mouth once daily. (Patient not taking: Reported on 2/25/2025), Disp: , Rfl:     olopatadine (Patanol) 0.1 % ophthalmic solution, INSTILL 1 DROP INTO BOTH EYES TWICE DAILY AS NEEDED AT 6-8 HOUR INTERVALS. (Patient not taking: Reported on 2/25/2025), Disp: , Rfl:     phentermine (Adipex-P) 37.5 mg tablet, Please take 1 tablet by mouth with BREAKFAST every day, no later.  Lots of fluids throughout the day.  Thank you. (Patient not taking: Reported on 2/25/2025), Disp: 30 tablet, Rfl: 0    saccharomyces boulardii (Florastor) 250 mg capsule, Take 1 capsule (250 mg) by mouth 2 times a day. (Patient not taking: Reported on 2/25/2025), Disp: , Rfl:     tiZANidine (Zanaflex) 2 mg tablet, Please take 1 tablet by mouth every 6 hours as needed for musculoskeletal pain.  Watch out for daytime sleepiness.  No driving if drowsy.  Do not take with alcohol.  Thank you. (Patient not taking:  "Reported on 2/25/2025), Disp: 45 tablet, Rfl: 0    triamcinolone (Kenalog) 0.1 % ointment, Please apply thin layer to rashes 2 times a day.  Do not cover with bandage.  Drink lots of fluids throughout the day.  Thank you. (Patient not taking: Reported on 12/5/2024), Disp: 80 g, Rfl: 0    venlafaxine XR (Effexor XR) 37.5 mg 24 hr capsule, Take 1 capsule (37.5 mg) by mouth once daily. Do not crush or chew. (Patient not taking: Reported on 2/25/2025), Disp: 90 capsule, Rfl: 1    venlafaxine XR (Effexor-XR) 150 mg 24 hr capsule, Take 1 capsule (150 mg) by mouth once daily. Do not crush or chew. (Patient taking differently: Take 1 capsule (150 mg) by mouth once daily. Do not crush or chew. States she takes with 37.5 mg dose), Disp: 90 capsule, Rfl: 1     Objective    BSA: 2.02 meters squared  /76 (BP Location: Left arm, Patient Position: Sitting, BP Cuff Size: Large adult)   Pulse 76   Temp 35.6 °C (96.1 °F) (Temporal)   Resp 18   Ht (S) 1.554 m (5' 1.18\")   Wt 94.3 kg (207 lb 12.8 oz)   SpO2 96%   BMI 39.03 kg/m²     ECOG Performance Status:  ECOG performance status: Symptomatic; fully ambulatory    Physical Exam    GA: alert, oriented, cooperative  HEENT: anicteric sclera, well injected conjunctiva  Heart: RRR, no murmurs   Lung: CTAB  Abd: +BS, soft, non tender   Ext Peripheral pulses positive, warm extremities  Right breast well healing incision, no new nodules or LN    Laboratory Data:  Lab Results   Component Value Date    WBC 7.6 10/19/2024    HGB 14.1 10/19/2024    HCT 43.3 10/19/2024    MCV 93 10/19/2024     10/19/2024       Chemistry    Lab Results   Component Value Date/Time     10/19/2024 1017    K 4.3 10/19/2024 1017     10/19/2024 1017    CO2 28 10/19/2024 1017    BUN 23 10/19/2024 1017    CREATININE 0.72 10/19/2024 1017    Lab Results   Component Value Date/Time    CALCIUM 8.9 10/19/2024 1017    ALKPHOS 59 10/19/2024 1017    AST 17 10/19/2024 1017    ALT 23 10/19/2024 1017 "    BILITOT 0.8 10/19/2024 1017             Radiology:  BI RAD breast exam specimen  Narrative: Interpreted By:  Derick Vaca,   STUDY:  BI RAD BREAST EXAM SPECIMEN;  2/3/2025 9:00 am      ACCESSION NUMBER(S):  [id number]      ORDERING CLINICIAN:  LENKA ARAUJO      INDICATION:  Breast Magseed localization.          FINDINGS:  The specimen radiograph contains both the biopsy clip as well as the  Mag seed.      Impression: Status post surgical excision of both the biopsy clip and the Magseed.      MACRO:  None      Signed by: Derick Vaca 2/3/2025 9:18 AM  Dictation workstation:   [id number]       BI mammo right diagnostic tomosynthesis 12/02/2024  BI US breast limited right 12/02/2024    Narrative  Interpreted By:  Genaro Mcknight,  STUDY:  BI MAMMO RIGHT DIAGNOSTIC TOMOSYNTHESIS; BI US BREAST LIMITED RIGHT;  12/2/2024 9:28 am; 12/2/2024 10:33 am    ACCESSION NUMBER(S):  [id number]; [id number]    ORDERING CLINICIAN:  JUDITH MEANS    INDICATION:  Right breast BI-RADS 0 screening mammogram for an area of asymmetry  in the lower outer aspect.    ,R92.8 Other abnormal and inconclusive findings on diagnostic imaging  of breast    COMPARISON:  Mammograms of 10/31/2024, 08/14/2023 06/14/2021, 04/04/2016.    FINDINGS:  MAMMOGRAPHY: Right breast spot compression tomosynthesis images were  obtained in the CC and MLO projections.    Density:  There are scattered areas of fibroglandular density.    A small area of asymmetry in the right breast lower outer quadrant at  middle depth persists on spot compression views. No tumoral  calcification is seen.    ULTRASOUND: Targeted ultrasound was performed of the right breast  lower outer quadrant by a registered sonographer. Sonographic survey  of the right axillary region also performed.    Within the right breast at the 9:30 position 5 cm from nipple edge  there is an irregular heterogeneously hyperechoic lesion with  ill-defined margins measuring approximately 8 x 6 x 9 mm with  some  associated acoustic shadowing. No intrinsic color Doppler blood flow  is demonstrated.    Survey of the right axillary region identifies two lymph nodes which  maintain a more benign sonographic morphology with hyperechoic jose carlos  and non thickened peripheral hypoechoic cortices.    Impression  Right breast indeterminate 9 mm heterogeneously hyperechoic lesion  located at the 9:30 position 5 cm from nipple edge. Neoplasm can not  be excluded and biopsy is recommended.    BI-RADS CATEGORY:  BI-RADS Category:  4 Suspicious.  Recommendation:  Surgical Consultation and Biopsy.  Recommended Date:  Immediate.  Laterality:  Right.    For any future breast imaging appointments, please call [phone]  ([phone]).      MACRO:  None    Signed by: Genaro Mcknight 12/2/2024 10:45 AM  Dictation workstation:   [id number]          Assessment/Plan:  74 year old female patient with newly diagnosed pT1bNx ER/PA positive, HER2 negative BC. On 02/03/2025, she underwent a  right breast PM with path showing grade 2 , 9 mm focus of IDC. All margins are negative. Nodes not assessed. Final stage pT1bNx: ER: 95%, PA: 95%, HER2 negative (1+). Presenting to discuss treatment options.     I reviewed with her the events that led to her diagnosis of breast cancer. We reviewed all the procedures and diagnostic imaging she underwent thus far. I discussed the features of her breast cancer that include the size, grade, lymph node status and  hormone receptor/ her2-rodney status.     - Referral to Rad/Onc Dr Gibbs  to discuss role of radiation therapy  - RTC post rad/onc meeting to discuss endocrine therapy further- discussed potential side effects of anastrozole. Fup in 4 weeks       At least 60  minutes of direct consultation was spent reviewing the patient's chart as well as discussing and  reviewing the  cancer care plan including educating and answering questions and concerns, greater than 50 percent spent in counseling and coordination of  care.                 Sherry Olmedo MD  Hematology and Medical Oncology  [facility]

## 2025-02-26 ENCOUNTER — HOSPITAL ENCOUNTER (OUTPATIENT)
Dept: RADIATION ONCOLOGY | Facility: CLINIC | Age: 75
Setting detail: RADIATION/ONCOLOGY SERIES
Discharge: HOME | End: 2025-02-26
Payer: MEDICARE

## 2025-02-26 VITALS
SYSTOLIC BLOOD PRESSURE: 127 MMHG | RESPIRATION RATE: 18 BRPM | OXYGEN SATURATION: 99 % | TEMPERATURE: 95.2 F | BODY MASS INDEX: 39.59 KG/M2 | DIASTOLIC BLOOD PRESSURE: 74 MMHG | WEIGHT: 210.76 LBS | HEART RATE: 74 BPM

## 2025-02-26 DIAGNOSIS — C50.411 MALIGNANT NEOPLASM OF UPPER-OUTER QUADRANT OF RIGHT FEMALE BREAST, UNSPECIFIED ESTROGEN RECEPTOR STATUS: ICD-10-CM

## 2025-02-26 DIAGNOSIS — C50.411 MALIGNANT NEOPLASM OF UPPER-OUTER QUADRANT OF RIGHT BREAST IN FEMALE, ESTROGEN RECEPTOR POSITIVE: Primary | ICD-10-CM

## 2025-02-26 DIAGNOSIS — Z17.0 MALIGNANT NEOPLASM OF UPPER-OUTER QUADRANT OF RIGHT BREAST IN FEMALE, ESTROGEN RECEPTOR POSITIVE: Primary | ICD-10-CM

## 2025-02-26 PROCEDURE — G2211 COMPLEX E/M VISIT ADD ON: HCPCS | Performed by: RADIOLOGY

## 2025-02-26 PROCEDURE — 99205 OFFICE O/P NEW HI 60 MIN: CPT | Performed by: RADIOLOGY

## 2025-02-26 PROCEDURE — 99215 OFFICE O/P EST HI 40 MIN: CPT | Performed by: RADIOLOGY

## 2025-02-26 ASSESSMENT — ENCOUNTER SYMPTOMS
NERVOUS/ANXIOUS: 1
CARDIOVASCULAR NEGATIVE: 1
RESPIRATORY NEGATIVE: 1
CONSTITUTIONAL NEGATIVE: 1
DEPRESSION: 1
CONFUSION: 0
GASTROINTESTINAL NEGATIVE: 1
EYES NEGATIVE: 1
NEUROLOGICAL NEGATIVE: 1
DECREASED CONCENTRATION: 0
MUSCULOSKELETAL NEGATIVE: 1
ENDOCRINE NEGATIVE: 1
HEMATOLOGIC/LYMPHATIC NEGATIVE: 1
WOUND: 1
SLEEP DISTURBANCE: 0

## 2025-02-26 ASSESSMENT — PAIN SCALES - GENERAL: PAINLEVEL_OUTOF10: 0-NO PAIN

## 2025-02-26 NOTE — PROGRESS NOTES
Radiation Oncology Nursing Note    Prior Radiotherapy:  No  No radiation treatments to show. (Treatments may have been administered in another system.)     Current Systemic Treatment:  No     Presence of Pacemaker or ICD:  No    History of Autoimmune or Connective Tissue Disorders:  No    Pain: The patient's current pain level was assessed.  They report currently having a pain of 0 out of 10.  They feel their pain is under control without the use of pain medications.    Review of Systems:  Review of Systems   Constitutional: Negative.    HENT:  Negative.     Eyes: Negative.    Respiratory: Negative.     Cardiovascular: Negative.    Gastrointestinal: Negative.    Endocrine: Negative.    Genitourinary: Negative.     Musculoskeletal: Negative.    Skin:  Positive for wound (scabbed on right from PM 2/3/25). Negative for itching and rash.   Neurological: Negative.    Hematological: Negative.    Psychiatric/Behavioral:  Positive for depression (on meds and under control). Negative for confusion, decreased concentration, sleep disturbance and suicidal ideas. The patient is nervous/anxious (on meds and under control).

## 2025-02-26 NOTE — PROGRESS NOTES
Staff Physician: Faye Gibbs MD  Referring Physician: Sherry Olmedo MD  Date of Service: 2/26/2025  Patient name: Cindy Calvillo   MRN: [id number]    RADIATION ONCOLOGY CONSULT NOTE    IDENTIFYING DATA:  DIAGNOSIS: Newly diagnosed, localized  Cancer Staging   Malignant neoplasm of upper-outer quadrant of right female breast  Staging form: Breast, AJCC 8th Edition  - Pathologic stage from 2/3/2025: Stage Unknown (pT1b, pNX, G2, ER+, CO+, HER2-) - Signed by Sherry Olmedo MD on 2/25/2025    DISEASE STATE: Undergoing active treatment  DISEASE STATUS: Under treatment, pending re-staging  Problem List Items Addressed This Visit    None      IDENTIFICATION  Ms. Cindy Calvillo is a 74-year-old with RIGHT breast pT1bNx IDC ER+CO+Zwa0khp (0.9 cm, grade 2, no LVSI, no DCIS, margins negative, no SLN Bx) treated with partial mastectomy.  She presents to discuss adjuvant radiation therapy.    ONCOLOGIC HISTORY:    10/31/2024 BL Screening Mammogram:   Right breast: focal asymmetry inferolateral breast  Left breast: benign    12/2/2024 Right Dx Mammogram/US  Right breast LOQ persistent asymmetry, no calcs  Right breast US @ 9:30 N+5 cm 0.8 x 0.6 x 0.9 cm hyperechoic mass.  Right axilla US: 2 lymph nodes benign characteristics    12/6/2024 Right Breast US guided biopsy 9:30 N+5 cm hyperechoic lesion (clip placed).  Pathology: Invasive ductal carcinoma, grade 2, ER+95%CO+95%Qdv6sgqjfzts (1+IHC)    2/3/2025 Magseed Localized Right breast partial mastectomy (Dr Amaro)    Intra-op: specimen radiography confirmed biopsy clip and Mag seed.  Pathology: 0.9 cm IDC grade 2, no LVSI, margins negative, no DCIS  pT1bNx    She had no palpable mass in breast at diagnosis.  No breast changes or skin changes.  She has been getting routine mammography.  She has no significant family history.  Menopause in 50s.  P2, Short term OCPS when younger.      She is healing well from surgery.  She notes some lumpiness about her incision in the breast.  " She has never had radiation therapy before.  No implantable devices.  No CTD or autoimmune disease.      PAST MEDICAL HISTORY:  Past Medical History:   Diagnosis Date    Anxiety     Arthritis     Depression     Hypertension     [place] on CPAP     states she uses sometimes    PONV (postoperative nausea and vomiting)     Seasonal allergies     Skin disorder     eczema in the past    Sleep apnea     CPAP used \"sometimes\"    Uterine cancer (Multi)     Wears glasses      PAST SURGICAL HISTORY:  Past Surgical History:   Procedure Laterality Date    BREAST BIOPSY      BREAST LUMPECTOMY Right     COLONOSCOPY      COLONOSCOPY W/ POLYPECTOMY N/A     HYSTERECTOMY      KNEE SURGERY N/A     MENISCECTOMY Left     ROTATOR CUFF REPAIR Left     x 2    SINUS SURGERY N/A     TONSILLECTOMY N/A     WISDOM TOOTH EXTRACTION N/A      ALLERGIES:  Allergies   Allergen Reactions    Atorvastatin Other     myalgia    Codeine Unknown    Doxycycline Unknown    Montelukast Unknown    Oxycodone Itching    Penicillins Unknown     MEDICATIONS:    Current Outpatient Medications:     albuterol 90 mcg/actuation inhaler, Inhale 2 puffs every 4 hours if needed for shortness of breath or wheezing. (Patient not taking: Reported on 2/25/2025), Disp: 18 g, Rfl: 2    alendronate (Fosamax) 35 mg tablet, Take 1 tablet (35 mg) by mouth every 7 days. (Patient not taking: Reported on 2/25/2025), Disp: 13 tablet, Rfl: 1    cetirizine (ZyrTEC) 10 mg tablet, Take 1 tablet (10 mg) by mouth once daily as needed., Disp: , Rfl:     cholecalciferol (Vitamin D-3) 50,000 unit capsule, PLEASE TAKE 1 CAPSULE BY MOUTH SUNDAYS WITH LUNCH AND A FULL GLASS OF WATER. THANK YOU. (Patient not taking: Reported on 2/25/2025), Disp: 13 capsule, Rfl: 0    cyanocobalamin (Vitamin B-12) 1,000 mcg tablet, Take 1 tablet (1,000 mcg) by mouth once daily., Disp: , Rfl:     dilTIAZem ER (Tiazac) 120 mg 24 hr capsule, TAKE 1 CAPSULE BY MOUTH ONCE DAILY., Disp: 90 capsule, Rfl: 1    famotidine " (Pepcid) 20 mg tablet, Please take 1 tablet by mouth before breakfast time, and again 1 tablet by mouth before suppertime.  Please take twice a day, even if you are not going to eat.  Thank you. (Patient not taking: Reported on 12/5/2024), Disp: 180 tablet, Rfl: 1    fluconazole (Diflucan) 150 mg tablet, Please take 1 tablet by mouth every other day for a total of 3 doses.  Thank you.  Lots of fluids throughout the day. (Patient not taking: Reported on 12/5/2024), Disp: 3 tablet, Rfl: 0    fluticasone (Flonase) 50 mcg/actuation nasal spray, Use 2 sprays into each nostril after breakfast, and use 2 sprays into each nostril after supper.  Thank you. (Patient taking differently: 2 times a day as needed. Use 2 sprays into each nostril after breakfast, and use 2 sprays into each nostril after supper.  Thank you.), Disp: 16 mL, Rfl: 5    lisinopril 5 mg tablet, PLEASE TAKE 1 TABLET BY MOUTH WITH SUPPER EVERY EVENING. THANK YOU., Disp: 90 tablet, Rfl: 1    metoprolol succinate XL (Toprol-XL) 25 mg 24 hr tablet, Please take ONLY HALF TABLET by mouth with SUPPER every evening.  Do not crush or chew.  Thank you. (Patient not taking: Reported on 2/25/2025), Disp: 45 tablet, Rfl: 1    multivitamin tablet, Take 1 tablet by mouth once daily. (Patient not taking: Reported on 2/25/2025), Disp: , Rfl:     olopatadine (Patanol) 0.1 % ophthalmic solution, INSTILL 1 DROP INTO BOTH EYES TWICE DAILY AS NEEDED AT 6-8 HOUR INTERVALS. (Patient not taking: Reported on 2/25/2025), Disp: , Rfl:     phentermine (Adipex-P) 37.5 mg tablet, Please take 1 tablet by mouth with BREAKFAST every day, no later.  Lots of fluids throughout the day.  Thank you. (Patient not taking: Reported on 2/25/2025), Disp: 30 tablet, Rfl: 0    saccharomyces boulardii (Florastor) 250 mg capsule, Take 1 capsule (250 mg) by mouth 2 times a day. (Patient not taking: Reported on 2/25/2025), Disp: , Rfl:     tiZANidine (Zanaflex) 2 mg tablet, Please take 1 tablet by mouth  every 6 hours as needed for musculoskeletal pain.  Watch out for daytime sleepiness.  No driving if drowsy.  Do not take with alcohol.  Thank you. (Patient not taking: Reported on 2025), Disp: 45 tablet, Rfl: 0    triamcinolone (Kenalog) 0.1 % ointment, Please apply thin layer to rashes 2 times a day.  Do not cover with bandage.  Drink lots of fluids throughout the day.  Thank you. (Patient not taking: Reported on 2024), Disp: 80 g, Rfl: 0    venlafaxine XR (Effexor XR) 37.5 mg 24 hr capsule, Take 1 capsule (37.5 mg) by mouth once daily. Do not crush or chew. (Patient not taking: Reported on 2025), Disp: 90 capsule, Rfl: 1    venlafaxine XR (Effexor-XR) 150 mg 24 hr capsule, Take 1 capsule (150 mg) by mouth once daily. Do not crush or chew. (Patient taking differently: Take 1 capsule (150 mg) by mouth once daily. Do not crush or chew. States she takes with 37.5 mg dose), Disp: 90 capsule, Rfl: 1   SOCIAL HISTORY:  Social History     Tobacco Use    Smoking status: Never     Passive exposure: Past    Smokeless tobacco: Never   Substance Use Topics    Alcohol use: Never     FAMILY HISTORY:  Family History   Problem Relation Name Age of Onset    Cervical cancer Mother      Cancer Father         REVIEW OF SYSTEMS:  Please refer to RN note.    PHYSICAL EXAMINATION:  There were no vitals taken for this visit.  Physical Exam   Healing incision LOQ right breast  There is some firmness to palpation just superior to incision, likely post-operative changes  No palpable masses in bilateral breasts, skin clear, no JUAN  No UE edema    CTCAE (v5) ADVERSE EVENTS:      PERFORMANCE STATUS:  KPS/ECO, Able to carry on normal activity; minor signs or symptoms of disease (ECOG equivalent 0)    LABORATORY AND IMAGING DATA:  Imaging: All imaging was personally reviewed and interpreted in clinic. Findings as per HPI and EMR.    No valid procedures specified.     Laboratory/Pathology:  All pertinent labs and pathology were  personally reviewed and interpreted in clinic. Findings as per HPI and EMR.      IMPRESSION:  Ms. Cindy Calvillo is a 74-year-old with RIGHT breast pT1bNx IDC ER+NC+Eyd9zcn (0.9 cm, grade 2, no LVSI, no DCIS, margins negative, no SLN Bx) treated with partial mastectomy.  She presents to discuss adjuvant radiation therapy    I reviewed the different radiation therapy treatment options including whole breast radiation therapy over 1-3 weeks and accelerated partial breast radiation therapy. We also discussed omission of radiation therapy which is supported by the PRIME II and CALGB 9343 Trials.  I outlined that these trials demonstrated equivalent survival with endocrine therapy alone versus endocrine therapy and radiation therapy for women >/= 65 and 70 years of age with early stage ER+NC+[id number] disease.  I did discuss that there was a higher local recurrence rate with endocrine therapy alone compared to endocrine therapy and radiation therapy 10% vs 1-2% at 10 years. I emphasized that while there was a benefit in terms of local recurrence, this did not translate to a survival benefit.      We also briefly discussed the  recently published results of the EUROPA Trial (Rose et al Lancet Oncology 12/2024) which compared endocrine therapy alone to radiation therapy alone in patients with favorable early stage breast cancer like her who 70 years of age and older.  At 2 years of follow-up there were fewer side effects amongst patients receiving radiation therapy alone compared to those receiving endocrine therapy alone.  There were no recurrences reported in either treatment group with 2 years FU.  I discussed that radiation alone is well tolerated and these data are promising, though longer follow-up is required.    Finally, we discussed the side effects and risk of RT, including the need for CT simulation, tattoo placement, and daily radiation (M-F) x 5 days. We discussed the side effects and risks of  radiation, including fatigue, radiation dermatitis, and breast pain in the short-term; skin fibrosis, pigmentation, chest wall pain, damage to underlying lung, rib, or heart, and secondary malignancies in the long-term. All questions were answered to the best of our ability and informed consent was obtained.    After our discussion she favored proceeding with radiation therapy.  She is concerned about side effects of endocrine therapy.        PLAN:  - 26 Gy in 5 fractions whole breast, prone, no boost vs APBI 26-30 Gy in 5 fractions  - Favor whole breast as no alex sampling  - Endocrine therapy to follow RT, advised that side effects vary and she can discuss a trial of endocrine therapy with Dr Olmedo after radiation  - I will give her 1-2 more weeks to heal up and then she will return for simulation.    Thank you for the opportunity to participate in the care of this kind patient.    Faye Gibbs MD  Radiation Oncology

## 2025-03-12 ENCOUNTER — HOSPITAL ENCOUNTER (OUTPATIENT)
Dept: RADIOLOGY | Facility: EXTERNAL LOCATION | Age: 75
Discharge: HOME | End: 2025-03-12

## 2025-03-12 ENCOUNTER — HOSPITAL ENCOUNTER (OUTPATIENT)
Dept: RADIATION ONCOLOGY | Facility: CLINIC | Age: 75
Setting detail: RADIATION/ONCOLOGY SERIES
Discharge: HOME | End: 2025-03-12
Payer: MEDICARE

## 2025-03-12 DIAGNOSIS — C50.411 MALIGNANT NEOPLASM OF UPPER-OUTER QUADRANT OF RIGHT FEMALE BREAST, UNSPECIFIED ESTROGEN RECEPTOR STATUS: ICD-10-CM

## 2025-03-12 PROCEDURE — 77332 RADIATION TREATMENT AID(S): CPT | Performed by: RADIOLOGY

## 2025-03-12 PROCEDURE — 77290 THER RAD SIMULAJ FIELD CPLX: CPT | Performed by: RADIOLOGY

## 2025-03-21 ENCOUNTER — HOSPITAL ENCOUNTER (OUTPATIENT)
Dept: RADIATION ONCOLOGY | Facility: CLINIC | Age: 75
Setting detail: RADIATION/ONCOLOGY SERIES
Discharge: HOME | End: 2025-03-21
Payer: MEDICARE

## 2025-03-21 ENCOUNTER — HOSPITAL ENCOUNTER (OUTPATIENT)
Dept: RADIOLOGY | Facility: EXTERNAL LOCATION | Age: 75
Discharge: HOME | End: 2025-03-21

## 2025-03-21 DIAGNOSIS — C50.511 CARCINOMA OF LOWER-OUTER QUADRANT OF FEMALE BREAST, RIGHT: ICD-10-CM

## 2025-03-26 ENCOUNTER — APPOINTMENT (OUTPATIENT)
Dept: HEMATOLOGY/ONCOLOGY | Facility: CLINIC | Age: 75
End: 2025-03-26
Payer: MEDICARE

## 2025-03-31 ENCOUNTER — HOSPITAL ENCOUNTER (OUTPATIENT)
Dept: RADIATION ONCOLOGY | Facility: CLINIC | Age: 75
Setting detail: RADIATION/ONCOLOGY SERIES
Discharge: HOME | End: 2025-03-31
Payer: MEDICARE

## 2025-03-31 PROCEDURE — 77334 RADIATION TREATMENT AID(S): CPT | Performed by: RADIOLOGY

## 2025-03-31 PROCEDURE — 77300 RADIATION THERAPY DOSE PLAN: CPT | Performed by: RADIOLOGY

## 2025-03-31 PROCEDURE — 77295 3-D RADIOTHERAPY PLAN: CPT | Performed by: RADIOLOGY

## 2025-04-01 ENCOUNTER — APPOINTMENT (OUTPATIENT)
Dept: PRIMARY CARE | Facility: CLINIC | Age: 75
End: 2025-04-01
Payer: MEDICARE

## 2025-04-02 ENCOUNTER — HOSPITAL ENCOUNTER (OUTPATIENT)
Dept: RADIATION ONCOLOGY | Facility: CLINIC | Age: 75
Setting detail: RADIATION/ONCOLOGY SERIES
Discharge: HOME | End: 2025-04-02
Payer: MEDICARE

## 2025-04-02 DIAGNOSIS — C50.511 MALIGNANT NEOPLASM OF LOWER-OUTER QUADRANT OF RIGHT FEMALE BREAST: ICD-10-CM

## 2025-04-02 LAB
RAD ONC MSQ ACTUAL FRACTIONS DELIVERED: 1
RAD ONC MSQ ACTUAL SESSION DELIVERED DOSE: 520 CGRAY
RAD ONC MSQ ACTUAL TOTAL DOSE: 520 CGRAY
RAD ONC MSQ ELAPSED DAYS: 0
RAD ONC MSQ LAST DATE: NORMAL
RAD ONC MSQ PRESCRIBED FRACTIONAL DOSE: 520 CGRAY
RAD ONC MSQ PRESCRIBED NUMBER OF FRACTIONS: 5
RAD ONC MSQ PRESCRIBED TECHNIQUE: NORMAL
RAD ONC MSQ PRESCRIBED TOTAL DOSE: 2600 CGRAY
RAD ONC MSQ START DATE: NORMAL
RAD ONC MSQ TREATMENT COURSE NUMBER: 1
RAD ONC MSQ TREATMENT SITE: NORMAL

## 2025-04-02 PROCEDURE — 77280 THER RAD SIMULAJ FIELD SMPL: CPT | Performed by: RADIOLOGY

## 2025-04-02 PROCEDURE — 77412 RADIATION TX DELIVERY LVL 3: CPT | Performed by: RADIOLOGY

## 2025-04-03 ENCOUNTER — HOSPITAL ENCOUNTER (OUTPATIENT)
Dept: RADIATION ONCOLOGY | Facility: CLINIC | Age: 75
Setting detail: RADIATION/ONCOLOGY SERIES
Discharge: HOME | End: 2025-04-03
Payer: MEDICARE

## 2025-04-03 DIAGNOSIS — C50.511 MALIGNANT NEOPLASM OF LOWER-OUTER QUADRANT OF RIGHT FEMALE BREAST: ICD-10-CM

## 2025-04-03 DIAGNOSIS — Z51.0 ENCOUNTER FOR ANTINEOPLASTIC RADIATION THERAPY: ICD-10-CM

## 2025-04-03 LAB
RAD ONC MSQ ACTUAL FRACTIONS DELIVERED: 2
RAD ONC MSQ ACTUAL SESSION DELIVERED DOSE: 520 CGRAY
RAD ONC MSQ ACTUAL TOTAL DOSE: 1040 CGRAY
RAD ONC MSQ ELAPSED DAYS: 1
RAD ONC MSQ LAST DATE: NORMAL
RAD ONC MSQ PRESCRIBED FRACTIONAL DOSE: 520 CGRAY
RAD ONC MSQ PRESCRIBED NUMBER OF FRACTIONS: 5
RAD ONC MSQ PRESCRIBED TECHNIQUE: NORMAL
RAD ONC MSQ PRESCRIBED TOTAL DOSE: 2600 CGRAY
RAD ONC MSQ START DATE: NORMAL
RAD ONC MSQ TREATMENT COURSE NUMBER: 1
RAD ONC MSQ TREATMENT SITE: NORMAL

## 2025-04-03 PROCEDURE — 77387 GUIDANCE FOR RADJ TX DLVR: CPT | Performed by: RADIOLOGY

## 2025-04-03 PROCEDURE — 77412 RADIATION TX DELIVERY LVL 3: CPT | Performed by: RADIOLOGY

## 2025-04-04 ENCOUNTER — HOSPITAL ENCOUNTER (OUTPATIENT)
Dept: RADIATION ONCOLOGY | Facility: CLINIC | Age: 75
Setting detail: RADIATION/ONCOLOGY SERIES
Discharge: HOME | End: 2025-04-04
Payer: MEDICARE

## 2025-04-04 DIAGNOSIS — Z51.0 ENCOUNTER FOR ANTINEOPLASTIC RADIATION THERAPY: ICD-10-CM

## 2025-04-04 DIAGNOSIS — C50.511 MALIGNANT NEOPLASM OF LOWER-OUTER QUADRANT OF RIGHT FEMALE BREAST: ICD-10-CM

## 2025-04-04 LAB
RAD ONC MSQ ACTUAL FRACTIONS DELIVERED: 3
RAD ONC MSQ ACTUAL SESSION DELIVERED DOSE: 520 CGRAY
RAD ONC MSQ ACTUAL TOTAL DOSE: 1560 CGRAY
RAD ONC MSQ ELAPSED DAYS: 2
RAD ONC MSQ LAST DATE: NORMAL
RAD ONC MSQ PRESCRIBED FRACTIONAL DOSE: 520 CGRAY
RAD ONC MSQ PRESCRIBED NUMBER OF FRACTIONS: 5
RAD ONC MSQ PRESCRIBED TECHNIQUE: NORMAL
RAD ONC MSQ PRESCRIBED TOTAL DOSE: 2600 CGRAY
RAD ONC MSQ START DATE: NORMAL
RAD ONC MSQ TREATMENT COURSE NUMBER: 1
RAD ONC MSQ TREATMENT SITE: NORMAL

## 2025-04-04 PROCEDURE — 77387 GUIDANCE FOR RADJ TX DLVR: CPT | Performed by: RADIOLOGY

## 2025-04-04 PROCEDURE — 77412 RADIATION TX DELIVERY LVL 3: CPT | Performed by: RADIOLOGY

## 2025-04-07 ENCOUNTER — HOSPITAL ENCOUNTER (OUTPATIENT)
Dept: RADIATION ONCOLOGY | Facility: CLINIC | Age: 75
Setting detail: RADIATION/ONCOLOGY SERIES
Discharge: HOME | End: 2025-04-07
Payer: MEDICARE

## 2025-04-07 DIAGNOSIS — Z51.0 ENCOUNTER FOR ANTINEOPLASTIC RADIATION THERAPY: ICD-10-CM

## 2025-04-07 DIAGNOSIS — C50.511 MALIGNANT NEOPLASM OF LOWER-OUTER QUADRANT OF RIGHT FEMALE BREAST: ICD-10-CM

## 2025-04-07 LAB
RAD ONC MSQ ACTUAL FRACTIONS DELIVERED: 4
RAD ONC MSQ ACTUAL SESSION DELIVERED DOSE: 520 CGRAY
RAD ONC MSQ ACTUAL TOTAL DOSE: 2080 CGRAY
RAD ONC MSQ ELAPSED DAYS: 5
RAD ONC MSQ LAST DATE: NORMAL
RAD ONC MSQ PRESCRIBED FRACTIONAL DOSE: 520 CGRAY
RAD ONC MSQ PRESCRIBED NUMBER OF FRACTIONS: 5
RAD ONC MSQ PRESCRIBED TECHNIQUE: NORMAL
RAD ONC MSQ PRESCRIBED TOTAL DOSE: 2600 CGRAY
RAD ONC MSQ START DATE: NORMAL
RAD ONC MSQ TREATMENT COURSE NUMBER: 1
RAD ONC MSQ TREATMENT SITE: NORMAL

## 2025-04-07 PROCEDURE — 77412 RADIATION TX DELIVERY LVL 3: CPT | Performed by: RADIOLOGY

## 2025-04-07 PROCEDURE — 77387 GUIDANCE FOR RADJ TX DLVR: CPT | Performed by: RADIOLOGY

## 2025-04-08 ENCOUNTER — HOSPITAL ENCOUNTER (OUTPATIENT)
Dept: RADIATION ONCOLOGY | Facility: CLINIC | Age: 75
Setting detail: RADIATION/ONCOLOGY SERIES
Discharge: HOME | End: 2025-04-08
Payer: MEDICARE

## 2025-04-08 VITALS — TEMPERATURE: 96.3 F

## 2025-04-08 DIAGNOSIS — Z51.0 ENCOUNTER FOR ANTINEOPLASTIC RADIATION THERAPY: ICD-10-CM

## 2025-04-08 DIAGNOSIS — C50.511 MALIGNANT NEOPLASM OF LOWER-OUTER QUADRANT OF RIGHT FEMALE BREAST: ICD-10-CM

## 2025-04-08 LAB
RAD ONC MSQ ACTUAL FRACTIONS DELIVERED: 5
RAD ONC MSQ ACTUAL SESSION DELIVERED DOSE: 520 CGRAY
RAD ONC MSQ ACTUAL TOTAL DOSE: 2600 CGRAY
RAD ONC MSQ ELAPSED DAYS: 6
RAD ONC MSQ LAST DATE: NORMAL
RAD ONC MSQ PRESCRIBED FRACTIONAL DOSE: 520 CGRAY
RAD ONC MSQ PRESCRIBED NUMBER OF FRACTIONS: 5
RAD ONC MSQ PRESCRIBED TECHNIQUE: NORMAL
RAD ONC MSQ PRESCRIBED TOTAL DOSE: 2600 CGRAY
RAD ONC MSQ START DATE: NORMAL
RAD ONC MSQ TREATMENT COURSE NUMBER: 1
RAD ONC MSQ TREATMENT SITE: NORMAL

## 2025-04-08 PROCEDURE — 77387 GUIDANCE FOR RADJ TX DLVR: CPT | Performed by: RADIOLOGY

## 2025-04-08 PROCEDURE — 77412 RADIATION TX DELIVERY LVL 3: CPT | Performed by: RADIOLOGY

## 2025-04-08 ASSESSMENT — PAIN SCALES - GENERAL: PAINLEVEL_OUTOF10: 0-NO PAIN

## 2025-04-08 NOTE — PROGRESS NOTES
Radiation Oncology On Treatment Visit    Patient Name:  Cindy Calvillo  MRN:  [id number]  :  1950    Referring Provider: Sherry Olmedo MD  Primary Care Provider: Chilo Graves MD  Care Team: Patient Care Team:  Chilo Graves MD as PCP - General  Sherry Olmedo MD as Consulting Physician (Hematology and Oncology)    Date of Service: 2025     Diagnosis:   Specialty Problems          Radiation Oncology Problems    Malignant neoplasm of upper-outer quadrant of right female breast        Malignant neoplasm of upper-outer quadrant of right breast in female, estrogen receptor positive         Treatment Summary:  Radiation Therapy    Treatment Period Technique Fraction Dose Fractions Total Dose   Course 1 2025-2025  (days elapsed: 5)         Right Breast 2025-2025 Opposed Tangential 520 / 520 cGy  2080 / 2,600 cGy     SUBJECTIVE: @20.8 Gy / 26 Gy, has final fraction today, seen before treatment.  No issues with RT.  Feels well.  Mild fatigue      OBJECTIVE:   Vital Signs:  Temp 35.7 °C (96.3 °F)     No skin changes over breast  Other Pertinent Findings:     Toxicity Assessment          2025    17:00   Toxicity Assessment   Adverse Events Reviewed (WDL) Yes (Within Defined Limits)   Treatment Site Breast   Anorexia Grade 0   Dehydration Grade 0   Dermatitis Radiation Grade 0   Fatigue Grade 1   Nausea Grade 0   Vomiting Grade 0   Esophagitis Grade 0   Breast Infection Grade 0   Joint Range of Motion Decreased Grade 0   Breast Pain Grade 0   Edema Limbs Grade 0   Lymphedema Grade 0        Assessment / Plan:  The patient is tolerating radiation therapy as anticipated.  Continue per current treatment plan.   Finished Treatment today.  Mammogram ordered by surgeon.  FU with Dr Olmedo, pending appt.  FU with Zena Blair NP in radiation oncology.        Scheduled/Direct

## 2025-04-09 ENCOUNTER — DOCUMENTATION (OUTPATIENT)
Dept: RADIATION ONCOLOGY | Facility: CLINIC | Age: 75
End: 2025-04-09
Payer: MEDICARE

## 2025-04-15 ENCOUNTER — OFFICE VISIT (OUTPATIENT)
Dept: HEMATOLOGY/ONCOLOGY | Facility: CLINIC | Age: 75
End: 2025-04-15
Payer: MEDICARE

## 2025-04-15 VITALS
OXYGEN SATURATION: 96 % | HEART RATE: 87 BPM | TEMPERATURE: 96.6 F | SYSTOLIC BLOOD PRESSURE: 136 MMHG | DIASTOLIC BLOOD PRESSURE: 82 MMHG | RESPIRATION RATE: 16 BRPM | WEIGHT: 217.59 LBS | BODY MASS INDEX: 40.87 KG/M2

## 2025-04-15 DIAGNOSIS — C50.411 MALIGNANT NEOPLASM OF UPPER-OUTER QUADRANT OF RIGHT FEMALE BREAST, UNSPECIFIED ESTROGEN RECEPTOR STATUS: ICD-10-CM

## 2025-04-15 PROCEDURE — 3075F SYST BP GE 130 - 139MM HG: CPT | Performed by: STUDENT IN AN ORGANIZED HEALTH CARE EDUCATION/TRAINING PROGRAM

## 2025-04-15 PROCEDURE — 99214 OFFICE O/P EST MOD 30 MIN: CPT | Performed by: STUDENT IN AN ORGANIZED HEALTH CARE EDUCATION/TRAINING PROGRAM

## 2025-04-15 PROCEDURE — 3079F DIAST BP 80-89 MM HG: CPT | Performed by: STUDENT IN AN ORGANIZED HEALTH CARE EDUCATION/TRAINING PROGRAM

## 2025-04-15 PROCEDURE — 1126F AMNT PAIN NOTED NONE PRSNT: CPT | Performed by: STUDENT IN AN ORGANIZED HEALTH CARE EDUCATION/TRAINING PROGRAM

## 2025-04-15 PROCEDURE — 1159F MED LIST DOCD IN RCRD: CPT | Performed by: STUDENT IN AN ORGANIZED HEALTH CARE EDUCATION/TRAINING PROGRAM

## 2025-04-15 PROCEDURE — 1123F ACP DISCUSS/DSCN MKR DOCD: CPT | Performed by: STUDENT IN AN ORGANIZED HEALTH CARE EDUCATION/TRAINING PROGRAM

## 2025-04-15 RX ORDER — ANASTROZOLE 1 MG/1
1 TABLET ORAL DAILY
Qty: 360 TABLET | Refills: 0 | Status: SHIPPED | OUTPATIENT
Start: 2025-04-15 | End: 2026-04-15

## 2025-04-15 RX ORDER — ANASTROZOLE 1 MG/1
1 TABLET ORAL DAILY
Qty: 360 TABLET | Refills: 0 | Status: SHIPPED
Start: 2025-04-15 | End: 2025-04-15 | Stop reason: WASHOUT

## 2025-04-15 ASSESSMENT — PAIN SCALES - GENERAL: PAINLEVEL_OUTOF10: 0-NO PAIN

## 2025-04-15 NOTE — PROGRESS NOTES
Patient ID: Cindy Calvillo is a 75 y.o. female.    The patient presents to clinic today for her history of breast cancer.     Cancer Staging   Malignant neoplasm of upper-outer quadrant of right female breast  Staging form: Breast, AJCC 8th Edition  - Pathologic stage from 2/3/2025: Stage Unknown (pT1b, pNX, G2, ER+, NM+, HER2-) - Signed by Sherry Olmedo MD on 2025        Diagnostic/Therapeutic History:    - 10/31/2024: Right breast focal asymmetry. Diagnostic mammogram recommended.  Ultrasound may be indicated- BIRADS category 0    - 2024: follow up diagnostic imaging: Right breast indeterminate 9 mm heterogeneously hyperechoic lesion located at the 9:30 position 5 cm from nipple edge. US axilla showed 2 morphololgically normal  lymph nodes     - 2024: Right breast  mass ultrasound guided core needle biopsy showed IDC G2 ER: 95%, NM: 95%, HER2 negative 1+     -2025: Dr Garcia performed a right breast PM with path showing grade 2 , 9 mm focus of IDC. All margins are negative. Nodes not assessed. Final stage pT1bNx: ER: 95%, NM: 95%, HER2 negative (1+)    -2025: completed adjuvant radiation therapy    History of Present Illness (HPI)/Interval History:  Doing well    She denies any fevers or chills.    She denies any chest pain or breathing issues.     She denies any vision changes or headache issues, dizziness, loss of balance, neuropathy and no falls.     She denies any new or unexplained bone aches or pains.    She denies any skin lesions or masses, hair loss, nail changes, or oral sores.    She reports a normal appetite and normal bowel movements. Her weight is stable.     Does have intermittent fatigue and insomina    PMH: anxiety, HTN, hx of Gyn cancer, uterine? S/p hysterectomy    Allergies: Reviewed     Family hx: mother cervical cancer?, father with head and neck cancer?    Reproductive hx:  menarche 15, menopause 53, OCP x couple years, HRT: no,         Review of  "Systems:  14-point ROS otherwise negative, as per HPI.    Past Medical History:   Diagnosis Date    Anxiety     Arthritis     Depression     Hypertension     [place] on CPAP     states she uses sometimes    PONV (postoperative nausea and vomiting)     Seasonal allergies     Skin disorder     eczema in the past    Sleep apnea     CPAP used \"sometimes\"    Uterine cancer (Multi)     Wears glasses        Past Surgical History:   Procedure Laterality Date    BREAST BIOPSY      BREAST LUMPECTOMY Right     COLONOSCOPY      COLONOSCOPY W/ POLYPECTOMY N/A     HYSTERECTOMY      KNEE SURGERY N/A     MENISCECTOMY Left     ROTATOR CUFF REPAIR Left     x 2    SINUS SURGERY N/A     TONSILLECTOMY N/A     WISDOM TOOTH EXTRACTION N/A        Social History     Socioeconomic History    Marital status:    Tobacco Use    Smoking status: Never     Passive exposure: Past    Smokeless tobacco: Never   Vaping Use    Vaping status: Never Used   Substance and Sexual Activity    Alcohol use: Never    Drug use: Never    Sexual activity: Defer     Comment: hysterectomy     Social Drivers of Health     Financial Resource Strain: Low Risk  (1/4/2024)    Overall Financial Resource Strain (CARDIA)     Difficulty of Paying Living Expenses: Not hard at all   Food Insecurity: No Food Insecurity (1/4/2024)    Hunger Vital Sign     Worried About Running Out of Food in the Last Year: Never true     Ran Out of Food in the Last Year: Never true   Transportation Needs: No Transportation Needs (1/4/2024)    PRAPARE - Transportation     Lack of Transportation (Medical): No     Lack of Transportation (Non-Medical): No   Physical Activity: Insufficiently Active (1/4/2024)    Exercise Vital Sign     Days of Exercise per Week: 7 days     Minutes of Exercise per Session: 20 min   Stress: No Stress Concern Present (1/4/2024)    [employer] [place] [employer] - Occupational Stress Questionnaire     Feeling of Stress : Only a little   Social Connections: " Moderately Isolated (1/4/2024)    Social Connection and Isolation Panel [NHANES]     Frequency of Communication with Friends and Family: More than three times a week     Frequency of Social Gatherings with Friends and Family: More than three times a week     Attends Caodaism Services: Never     Active Member of Clubs or Organizations: Yes     Attends Club or Organization Meetings: 1 to 4 times per year     Marital Status:    Intimate Partner Violence: Not At Risk (1/4/2024)    Humiliation, Afraid, Rape, and Kick questionnaire     Fear of Current or Ex-Partner: No     Emotionally Abused: No     Physically Abused: No     Sexually Abused: No   Housing Stability: Low Risk  (1/4/2024)    Housing Stability Vital Sign     Unable to Pay for Housing in the Last Year: No     Number of Places Lived in the Last Year: 1     Unstable Housing in the Last Year: No       Allergies   Allergen Reactions    Atorvastatin Other     myalgia    Codeine Unknown    Doxycycline Unknown    Montelukast Unknown    Oxycodone Itching    Penicillins Unknown         Current Outpatient Medications:     albuterol 90 mcg/actuation inhaler, Inhale 2 puffs every 4 hours if needed for shortness of breath or wheezing., Disp: 18 g, Rfl: 2    alendronate (Fosamax) 35 mg tablet, Take 1 tablet (35 mg) by mouth every 7 days., Disp: 13 tablet, Rfl: 1    cetirizine (ZyrTEC) 10 mg tablet, Take 1 tablet (10 mg) by mouth once daily as needed., Disp: , Rfl:     cholecalciferol (Vitamin D-3) 50,000 unit capsule, PLEASE TAKE 1 CAPSULE BY MOUTH SUNDAYS WITH LUNCH AND A FULL GLASS OF WATER. THANK YOU., Disp: 13 capsule, Rfl: 0    cyanocobalamin (Vitamin B-12) 1,000 mcg tablet, Take 1 tablet (1,000 mcg) by mouth once daily., Disp: , Rfl:     dilTIAZem ER (Tiazac) 120 mg 24 hr capsule, TAKE 1 CAPSULE BY MOUTH ONCE DAILY., Disp: 90 capsule, Rfl: 1    famotidine (Pepcid) 20 mg tablet, Please take 1 tablet by mouth before breakfast time, and again 1 tablet by mouth  before suppertime.  Please take twice a day, even if you are not going to eat.  Thank you., Disp: 180 tablet, Rfl: 1    fluconazole (Diflucan) 150 mg tablet, Please take 1 tablet by mouth every other day for a total of 3 doses.  Thank you.  Lots of fluids throughout the day., Disp: 3 tablet, Rfl: 0    fluticasone (Flonase) 50 mcg/actuation nasal spray, Use 2 sprays into each nostril after breakfast, and use 2 sprays into each nostril after supper.  Thank you. (Patient taking differently: 2 times a day as needed. Use 2 sprays into each nostril after breakfast, and use 2 sprays into each nostril after supper.  Thank you.), Disp: 16 mL, Rfl: 5    lisinopril 5 mg tablet, PLEASE TAKE 1 TABLET BY MOUTH WITH SUPPER EVERY EVENING. THANK YOU., Disp: 90 tablet, Rfl: 1    metoprolol succinate XL (Toprol-XL) 25 mg 24 hr tablet, Please take ONLY HALF TABLET by mouth with SUPPER every evening.  Do not crush or chew.  Thank you., Disp: 45 tablet, Rfl: 1    multivitamin tablet, Take 1 tablet by mouth once daily., Disp: , Rfl:     olopatadine (Patanol) 0.1 % ophthalmic solution, , Disp: , Rfl:     phentermine (Adipex-P) 37.5 mg tablet, Please take 1 tablet by mouth with BREAKFAST every day, no later.  Lots of fluids throughout the day.  Thank you., Disp: 30 tablet, Rfl: 0    saccharomyces boulardii (Florastor) 250 mg capsule, Take 1 capsule (250 mg) by mouth 2 times a day., Disp: , Rfl:     tiZANidine (Zanaflex) 2 mg tablet, Please take 1 tablet by mouth every 6 hours as needed for musculoskeletal pain.  Watch out for daytime sleepiness.  No driving if drowsy.  Do not take with alcohol.  Thank you., Disp: 45 tablet, Rfl: 0    triamcinolone (Kenalog) 0.1 % ointment, Please apply thin layer to rashes 2 times a day.  Do not cover with bandage.  Drink lots of fluids throughout the day.  Thank you., Disp: 80 g, Rfl: 0    venlafaxine XR (Effexor XR) 37.5 mg 24 hr capsule, Take 1 capsule (37.5 mg) by mouth once daily. Do not crush or  chew., Disp: 90 capsule, Rfl: 1    venlafaxine XR (Effexor-XR) 150 mg 24 hr capsule, Take 1 capsule (150 mg) by mouth once daily. Do not crush or chew. (Patient taking differently: Take 1 capsule (150 mg) by mouth once daily. Do not crush or chew. States she takes with 37.5 mg dose), Disp: 90 capsule, Rfl: 1     Objective    BSA: 2.06 meters squared  /82 (BP Location: Right arm, Patient Position: Sitting, BP Cuff Size: Large adult)   Pulse 87   Temp 35.9 °C (96.6 °F) (Temporal)   Resp 16   Wt 98.7 kg (217 lb 9.5 oz) Comment: no shoes,coat  SpO2 96%   BMI 40.87 kg/m²     ECOG Performance Status:  ECOG performance status: Symptomatic; fully ambulatory    Physical Exam    GA: alert, oriented, cooperative  HEENT: anicteric sclera, well injected conjunctiva  Heart: RRR, no murmurs   Lung: CTAB  Abd: +BS, soft, non tender   Ext Peripheral pulses positive, warm extremities  Right breast well healing incision, no new nodules or LN    Laboratory Data:  Lab Results   Component Value Date    WBC 7.6 10/19/2024    HGB 14.1 10/19/2024    HCT 43.3 10/19/2024    MCV 93 10/19/2024     10/19/2024       Chemistry    Lab Results   Component Value Date/Time     10/19/2024 1017    K 4.3 10/19/2024 1017     10/19/2024 1017    CO2 28 10/19/2024 1017    BUN 23 10/19/2024 1017    CREATININE 0.72 10/19/2024 1017    Lab Results   Component Value Date/Time    CALCIUM 8.9 10/19/2024 1017    ALKPHOS 59 10/19/2024 1017    AST 17 10/19/2024 1017    ALT 23 10/19/2024 1017    BILITOT 0.8 10/19/2024 1017             Radiology:  Rad Onc CT Sim Images  These images are not reportable by radiology and will not be interpreted   by  Radiologists.       BI mammo right diagnostic tomosynthesis 12/02/2024  BI US breast limited right 12/02/2024    Narrative  Interpreted By:  Genaro Mcknight,  STUDY:  BI MAMMO RIGHT DIAGNOSTIC TOMOSYNTHESIS; BI US BREAST LIMITED RIGHT;  12/2/2024 9:28 am; 12/2/2024 10:33 am    ACCESSION  NUMBER(S):  [id number]; [id number]    ORDERING CLINICIAN:  JUDITH MEANS    INDICATION:  Right breast BI-RADS 0 screening mammogram for an area of asymmetry  in the lower outer aspect.    ,R92.8 Other abnormal and inconclusive findings on diagnostic imaging  of breast    COMPARISON:  Mammograms of 10/31/2024, 08/14/2023 06/14/2021, 04/04/2016.    FINDINGS:  MAMMOGRAPHY: Right breast spot compression tomosynthesis images were  obtained in the CC and MLO projections.    Density:  There are scattered areas of fibroglandular density.    A small area of asymmetry in the right breast lower outer quadrant at  middle depth persists on spot compression views. No tumoral  calcification is seen.    ULTRASOUND: Targeted ultrasound was performed of the right breast  lower outer quadrant by a registered sonographer. Sonographic survey  of the right axillary region also performed.    Within the right breast at the 9:30 position 5 cm from nipple edge  there is an irregular heterogeneously hyperechoic lesion with  ill-defined margins measuring approximately 8 x 6 x 9 mm with some  associated acoustic shadowing. No intrinsic color Doppler blood flow  is demonstrated.    Survey of the right axillary region identifies two lymph nodes which  maintain a more benign sonographic morphology with hyperechoic jose carlos  and non thickened peripheral hypoechoic cortices.    Impression  Right breast indeterminate 9 mm heterogeneously hyperechoic lesion  located at the 9:30 position 5 cm from nipple edge. Neoplasm can not  be excluded and biopsy is recommended.    BI-RADS CATEGORY:  BI-RADS Category:  4 Suspicious.  Recommendation:  Surgical Consultation and Biopsy.  Recommended Date:  Immediate.  Laterality:  Right.    For any future breast imaging appointments, please call [phone]).      MACRO:  None    Signed by: Genaro Mcknight 12/2/2024 10:45 AM  Dictation workstation:   [id number]          Assessment/Plan:  74 year old female  patient with newly diagnosed pT1bNx ER/KS positive, HER2 negative BC. On 02/03/2025, she underwent a  right breast PM with path showing grade 2 , 9 mm focus of IDC. All margins are negative. Nodes not assessed. Final stage pT1bNx: ER: 95%, KS: 95%, HER2 negative (1+). Completed adjuvant radiation therapy on 04/08/2025 Presenting to discuss treatment options.     I reviewed with her the events that led to her diagnosis of breast cancer. We reviewed all the procedures and diagnostic imaging she underwent thus far. I discussed the features of her breast cancer that include the size, grade, lymph node status and  hormone receptor/ her2-rodney status.     - anastrozole, vit D  - DEXA scan in 08/2023: osteoporosis- given info about Zometa  - RTC in       At least 35  minutes of direct consultation was spent reviewing the patient's chart as well as discussing and reviewing the  cancer care plan including educating and answering questions and concerns, greater than 50 percent spent in counseling and coordination of care.                 Sherry Olmedo MD  Hematology and Medical Oncology  [facility], [place]

## 2025-04-16 ENCOUNTER — APPOINTMENT (OUTPATIENT)
Dept: PRIMARY CARE | Facility: CLINIC | Age: 75
End: 2025-04-16
Payer: MEDICARE

## 2025-04-16 VITALS
SYSTOLIC BLOOD PRESSURE: 119 MMHG | WEIGHT: 217 LBS | DIASTOLIC BLOOD PRESSURE: 74 MMHG | HEIGHT: 61 IN | OXYGEN SATURATION: 97 % | BODY MASS INDEX: 40.97 KG/M2 | HEART RATE: 79 BPM

## 2025-04-16 DIAGNOSIS — R11.2 NAUSEA AND VOMITING, UNSPECIFIED VOMITING TYPE: ICD-10-CM

## 2025-04-16 DIAGNOSIS — Z71.89 CARDIAC RISK COUNSELING: ICD-10-CM

## 2025-04-16 DIAGNOSIS — F33.1 MODERATE EPISODE OF RECURRENT MAJOR DEPRESSIVE DISORDER: ICD-10-CM

## 2025-04-16 DIAGNOSIS — J30.1 SEASONAL ALLERGIC RHINITIS DUE TO POLLEN: ICD-10-CM

## 2025-04-16 DIAGNOSIS — J98.01 BRONCHOSPASM: ICD-10-CM

## 2025-04-16 DIAGNOSIS — Z12.11 SCREENING FOR COLON CANCER: ICD-10-CM

## 2025-04-16 DIAGNOSIS — R63.5 WEIGHT GAIN: Primary | ICD-10-CM

## 2025-04-16 DIAGNOSIS — E53.8 VITAMIN B12 DEFICIENCY: ICD-10-CM

## 2025-04-16 DIAGNOSIS — R14.0 FLATULENCE/GAS PAIN/BELCHING: ICD-10-CM

## 2025-04-16 DIAGNOSIS — L65.9 ALOPECIA: ICD-10-CM

## 2025-04-16 DIAGNOSIS — R73.03 PREDIABETES: ICD-10-CM

## 2025-04-16 DIAGNOSIS — I10 ESSENTIAL HYPERTENSION: ICD-10-CM

## 2025-04-16 DIAGNOSIS — C50.411 MALIGNANT NEOPLASM OF UPPER-OUTER QUADRANT OF RIGHT BREAST IN FEMALE, ESTROGEN RECEPTOR POSITIVE: ICD-10-CM

## 2025-04-16 DIAGNOSIS — Z17.0 MALIGNANT NEOPLASM OF UPPER-OUTER QUADRANT OF RIGHT BREAST IN FEMALE, ESTROGEN RECEPTOR POSITIVE: ICD-10-CM

## 2025-04-16 DIAGNOSIS — E55.9 VITAMIN D DEFICIENCY: ICD-10-CM

## 2025-04-16 DIAGNOSIS — M81.0 AGE-RELATED OSTEOPOROSIS WITHOUT CURRENT PATHOLOGICAL FRACTURE: ICD-10-CM

## 2025-04-16 DIAGNOSIS — G47.33 OBSTRUCTIVE SLEEP APNEA ON CPAP: ICD-10-CM

## 2025-04-16 DIAGNOSIS — E66.01 MORBID OBESITY WITH BMI OF 40.0-44.9, ADULT (MULTI): ICD-10-CM

## 2025-04-16 DIAGNOSIS — E78.2 MIXED HYPERLIPIDEMIA: ICD-10-CM

## 2025-04-16 PROCEDURE — 99213 OFFICE O/P EST LOW 20 MIN: CPT | Performed by: INTERNAL MEDICINE

## 2025-04-16 PROCEDURE — 1160F RVW MEDS BY RX/DR IN RCRD: CPT | Performed by: INTERNAL MEDICINE

## 2025-04-16 PROCEDURE — 3074F SYST BP LT 130 MM HG: CPT | Performed by: INTERNAL MEDICINE

## 2025-04-16 PROCEDURE — 1036F TOBACCO NON-USER: CPT | Performed by: INTERNAL MEDICINE

## 2025-04-16 PROCEDURE — 3078F DIAST BP <80 MM HG: CPT | Performed by: INTERNAL MEDICINE

## 2025-04-16 PROCEDURE — 1159F MED LIST DOCD IN RCRD: CPT | Performed by: INTERNAL MEDICINE

## 2025-04-16 PROCEDURE — 3008F BODY MASS INDEX DOCD: CPT | Performed by: INTERNAL MEDICINE

## 2025-04-16 PROCEDURE — 1123F ACP DISCUSS/DSCN MKR DOCD: CPT | Performed by: INTERNAL MEDICINE

## 2025-04-16 PROCEDURE — G2211 COMPLEX E/M VISIT ADD ON: HCPCS | Performed by: INTERNAL MEDICINE

## 2025-04-16 RX ORDER — FLUTICASONE PROPIONATE 50 MCG
SPRAY, SUSPENSION (ML) NASAL
Qty: 16 ML | Refills: 5 | Status: SHIPPED | OUTPATIENT
Start: 2025-04-16

## 2025-04-16 RX ORDER — DILTIAZEM HYDROCHLORIDE 120 MG/1
120 CAPSULE, EXTENDED RELEASE ORAL DAILY
Qty: 90 CAPSULE | Refills: 1 | Status: SHIPPED | OUTPATIENT
Start: 2025-04-16

## 2025-04-16 RX ORDER — LISINOPRIL 5 MG/1
TABLET ORAL
Qty: 90 TABLET | Refills: 1 | Status: SHIPPED | OUTPATIENT
Start: 2025-04-16

## 2025-04-16 RX ORDER — ALBUTEROL SULFATE 90 UG/1
2 INHALANT RESPIRATORY (INHALATION) EVERY 4 HOURS PRN
Qty: 18 G | Refills: 2 | Status: SHIPPED | OUTPATIENT
Start: 2025-04-16

## 2025-04-16 NOTE — PROGRESS NOTES
"Subjective   Patient ID: Cindy Calvillo is a 75 y.o. female who presents for Annual Exam (Physical Exam).    HPI   Somewhat lost to follow-up.  Interval history noted.  Status post right breast cancer, partial mastectomy, right.  Being maintained on ANASTROZOLE.  Patient continues to want to improve quality of life, and does not wish harm to self or others.  CONSTITUTIONALLY, no fever, no chills.  No night sweats.  No lingering anorexia or nausea.  No apparent lymphadenopathy.  No apparent weight loss.    Frustrated about gas and flatulence.  Wondering if she can take GAS-X.  Has not had any lab work done since October.  Appetite preserved, no apparent blood or mucus in stool.  No skin changes.  No dysuria.  Has yet to do her COLONOSCOPY.  GI is calling for her to make an appointment.  She will finally do this for the gas symptoms as well.    Continued ALOPECIA.  States that we have already checked her thyroid and other things, all revealing negative findings.  Yes, she will want to see the dermatologist instead of trying medications first.    Would like to officially restart PHENTERMINE/ADIPEX.  She did have leftover medications after picking up her last prescription in October.  Officially, she can start again in May, she understands, but she has leftover regimens, and can she take it anyway?  No palpitations, dizziness, diaphoresis.  No particular dry mouth, no odynophagia.  No constipation.  No insomnia.  ENDOCRINE with no polyuria, polydipsia, polyphagia.  No blurred vision.  No skin, hair, nail changes.  No dramatic weight loss or weight gain.          Review of Systems  Review of systems as in history of present illness, and otherwise, reviewed separately as well, and was unremarkable/negative/noncontributory.        Objective   /74 (BP Location: Left arm, Patient Position: Sitting, BP Cuff Size: Adult)   Pulse 79   Ht 1.554 m (5' 1.18\")   Wt 98.4 kg (217 lb)   SpO2 97%   BMI 40.76 kg/m² "     Physical Exam  Anxious, but otherwise in hopeful spirits.  Does want to improve quality of life, and does not wish harm to self or others.  Obese build, but remaining independent and capable.  Appropriate.  Eager to get better.    HEAD pink palpebral conjunctivae, anicteric sclerae.  NECK supple, no apparent jugular venous distention.  CARDIOVASCULAR not in distress or diaphoresis.  No bipedal edema.  LUNGS not in distress or diaphoresis.  Not using accessory muscles.  ABDOMEN soft, nontender.  BACK no costovertebral angle tenderness.  EXTREMITIES no clubbing, no cyanosis.  NEURO no facial asymmetry.  No apparent cranial nerve deficits.  Romberg negative.  Ambulating without need of assistance.  No apparent focal weakness.  No tremors.  PSYCH receptive, appropriate, and eager to maintain and improve quality of life.        LABORATORY results to be updated soon.  Right breast biopsy noted, invasive ductal carcinoma grade 2.        Assessment/Plan   Diagnoses and all orders for this visit:  Weight gain  -     Comprehensive Metabolic Panel; Future  -     TSH with reflex to Free T4 if abnormal; Future  -     Follow Up In Primary Care - Established; Future  Morbid obesity with BMI of 40.0-44.9, adult (Multi)  -     Comprehensive Metabolic Panel; Future  -     TSH with reflex to Free T4 if abnormal; Future  -     Follow Up In Primary Care - Established; Future  Flatulence/gas pain/belching  -     Comprehensive Metabolic Panel; Future  -     TSH with reflex to Free T4 if abnormal; Future  -     Urinalysis with Reflex Culture and Microscopic; Future  -     Amylase; Future  -     Lipase; Future  -     Follow Up In Primary Care - Established; Future  Nausea and vomiting, unspecified vomiting type  -     Comprehensive Metabolic Panel; Future  -     TSH with reflex to Free T4 if abnormal; Future  -     Magnesium; Future  -     Urinalysis with Reflex Culture and Microscopic; Future  -     Amylase; Future  -     Lipase;  Future  -     Follow Up In Primary Care - Established; Future  Screening for colon cancer  -     CBC and Auto Differential; Future  -     Follow Up In Primary Care - Established; Future  Essential hypertension  -     Follow Up In Primary Care - Established  -     lisinopril 5 mg tablet; Please take 1 tablet by mouth with SUPPER every evening.  Thank you.  -     dilTIAZem ER (Tiazac) 120 mg 24 hr capsule; Take 1 capsule (120 mg) by mouth once daily.  -     CBC and Auto Differential; Future  -     Comprehensive Metabolic Panel; Future  -     TSH with reflex to Free T4 if abnormal; Future  -     Magnesium; Future  -     Urinalysis with Reflex Culture and Microscopic; Future  -     Follow Up In Primary Care - Established; Future  Mixed hyperlipidemia  -     Comprehensive Metabolic Panel; Future  -     Lipid Panel; Future  -     Follow Up In Primary Care - Established; Future  Prediabetes  -     Comprehensive Metabolic Panel; Future  -     Urinalysis with Reflex Culture and Microscopic; Future  -     Hemoglobin A1C; Future  -     Follow Up In Primary Care - Established; Future  Cardiac risk counseling  -     Comprehensive Metabolic Panel; Future  -     Lipid Panel; Future  -     Hemoglobin A1C; Future  -     Follow Up In Primary Care - Established; Future  Obstructive sleep apnea on CPAP  -     CBC and Auto Differential; Future  -     Comprehensive Metabolic Panel; Future  -     TSH with reflex to Free T4 if abnormal; Future  -     Magnesium; Future  -     Follow Up In Primary Care - Established; Future  Moderate episode of recurrent major depressive disorder  -     Comprehensive Metabolic Panel; Future  -     TSH with reflex to Free T4 if abnormal; Future  -     Follow Up In Primary Care - Established; Future  Vitamin D deficiency  -     Comprehensive Metabolic Panel; Future  -     Vitamin D 25-Hydroxy,Total (for eval of Vitamin D levels); Future  -     Follow Up In Primary Care - Established; Future  Vitamin B12  deficiency  -     CBC and Auto Differential; Future  -     Vitamin B12; Future  -     Folate; Future  -     Follow Up In Primary Care - Established; Future  Bronchospasm  -     albuterol 90 mcg/actuation inhaler; Inhale 2 puffs every 4 hours if needed for shortness of breath or wheezing.  -     CBC and Auto Differential; Future  -     TSH with reflex to Free T4 if abnormal; Future  -     Magnesium; Future  -     Follow Up In Primary Care - Established; Future  Seasonal allergic rhinitis due to pollen  -     fluticasone (Flonase) 50 mcg/actuation nasal spray; Use 2 sprays into each nostril after breakfast, and use 2 sprays into each nostril after supper.  Thank you.  -     CBC and Auto Differential; Future  -     Follow Up In Primary Care - [facility]; Future  Alopecia  -     CBC and Auto Differential; Future  -     Comprehensive Metabolic Panel; Future  -     TSH with reflex to Free T4 if abnormal; Future  -     Magnesium; Future  -     Referral to Dermatology  -     Follow Up In Primary Care - [facility]; Future  Malignant neoplasm of upper-outer quadrant of right breast in female, estrogen receptor positive  -     CBC and Auto Differential; Future  -     Follow Up In Primary Care - [facility]; Future  Age-related osteoporosis without current pathological fracture  -     Follow Up In Primary Care - Established; Future       Thank you very much for coming.  It is nice to see you again.    Please do your FASTING laboratory examinations anytime soon, any  facility I will send word regarding results and possible changes.  I may even be able to help you with your FLATULENCE depending on the results.    Until then, please avoid fat and dairy.  Please avoid things that tend to ferment in your belly, like cabbage and beans.  Small frequent meals please.  Gas-X/simethicone can be helpful.  You can give this a try.    Please contact GI and get yourself scheduled for your COLONOSCOPY.  They will also be able to answer  your questions regarding gas and flatulence.    Please go ahead and do your fasting laboratory examinations, and I will let you know how much VITAMIN D you need to take.    If you have it, go ahead and take the PHENTERMINE/ADIPEX with breakfast, no later.    You will be officially eligible to restart phentermine/Adipex in May.  If you would like, you can call me and get the prescription started.  Otherwise, I will see you again in 2 months.    If you would like, I can place you on a minoxidil tablet, or you can see DERMATOLOGY, so that you can save time and money with more specialized advice!    Please come back in 2 months.  We will see where we are when you get back.  Again, you can start phentermine/Adipex earlier by calling me next month and requesting a refill.    Until then, I hope you have a good wholly week and a happy Easter!  Take care and God bless.            0  Return in 2 months.  40 minutes please.  Medicare Wellness evaluation.  Review fasting laboratory results, mood, energy, function, debility.  Coordinate with , history of breast cancer, right, review GI symptoms, gas, colonoscopy.  Coordinate with dermatology, history of alopecia.  Reassess cardiovascular risk, fasting laboratory results.  Reassess options regarding weight management, vitamin D supplementation.  Reassess mood, energy, function, coordinate with behaviorist.            0

## 2025-04-16 NOTE — PATIENT INSTRUCTIONS
Thank you very much for coming.  It is nice to see you again.    Please do your FASTING laboratory examinations anytime soon, any  facility I will send word regarding results and possible changes.  I may even be able to help you with your FLATULENCE depending on the results.    Until then, please avoid fat and dairy.  Please avoid things that tend to ferment in your belly, like cabbage and beans.  Small frequent meals please.  Gas-X/simethicone can be helpful.  You can give this a try.    Please contact GI and get yourself scheduled for your COLONOSCOPY.  They will also be able to answer your questions regarding gas and flatulence.    Please go ahead and do your fasting laboratory examinations, and I will let you know how much VITAMIN D you need to take.    If you have it, go ahead and take the PHENTERMINE/ADIPEX with breakfast, no later.    You will be officially eligible to restart phentermine/Adipex in May.  If you would like, you can call me and get the prescription started.  Otherwise, I will see you again in 2 months.    If you would like, I can place you on a minoxidil tablet, or you can see DERMATOLOGY, so that you can save time and money with more specialized advice!    Please come back in 2 months.  We will see where we are when you get back.  Again, you can start phentermine/Adipex earlier by calling me next month and requesting a refill.    Until then, I hope you have a good wholly week and a happy Easter!  Take care and God bless.            0  Return in 2 months.  40 minutes please.  Medicare Wellness evaluation.  Review fasting laboratory results, mood, energy, function, debility.  Coordinate with GS, history of breast cancer, right, review GI symptoms, gas, colonoscopy.  Coordinate with dermatology, history of alopecia.  Reassess cardiovascular risk, fasting laboratory results.  Reassess options regarding weight management, vitamin D supplementation.  Reassess mood, energy, function, coordinate  with behaviorist.            0

## 2025-04-16 NOTE — PROGRESS NOTES
Radiation Oncology Treatment Summary    Patient Name:  Cindy Calvillo  MRN:  [id number]  :  1950    Referring Provider: No ref. provider found  Primary Care Provider: Chilo Graves MD    Brief History: Ms. Cindy Calvillo is a 76-year-old with RIGHT breast [id number] IDC ER+VA+[id number] (0.9 cm, grade 2, no LVSI, no DCIS, margins negative, no SLN Bx) treated with partial mastectomy.  She elected to receive adjuvant radiation therapy.  She completed right whole breast radiation therapy 26 Gy in 5 fractions on 2025.    Radiation Treatment Summary:    Radiation Therapy    Treatment Period Technique Fraction Dose Fractions Total Dose   Course 1 2025-2025  (days elapsed: 6)         Right Breast 2025-2025 Opposed Tangential 520 / 520 cGy  2,600 / 2,600 cGy       Please see the patient's [facility] chart for further details regarding the radiation plan, including beam energy.    Concurrent Chemotherapy:  Treatment Plans       No treatment plans exist          [facility] Toxicity Overview:   Toxicity Assessment          2025    17:00   Toxicity Assessment   Adverse Events Reviewed (WDL) Yes (Within Defined Limits)   Treatment Site Breast   Anorexia Grade 0   Dehydration Grade 0   Dermatitis Radiation Grade 0   Fatigue Grade 1   Nausea Grade 0   Vomiting Grade 0   Esophagitis Grade 0   Breast Infection Grade 0   Joint Range of Motion Decreased Grade 0   Breast Pain Grade 0   Edema Limbs Grade 0   Lymphedema Grade 0     Patient Disposition: FU with Zena Blair NP in radiation oncology.

## 2025-04-24 ENCOUNTER — APPOINTMENT (OUTPATIENT)
Dept: BEHAVIORAL HEALTH | Facility: CLINIC | Age: 75
End: 2025-04-24
Payer: COMMERCIAL

## 2025-04-24 VITALS
BODY MASS INDEX: 39.84 KG/M2 | WEIGHT: 216.5 LBS | DIASTOLIC BLOOD PRESSURE: 79 MMHG | HEART RATE: 70 BPM | SYSTOLIC BLOOD PRESSURE: 114 MMHG | HEIGHT: 62 IN | TEMPERATURE: 97.9 F | RESPIRATION RATE: 18 BRPM

## 2025-04-24 DIAGNOSIS — F41.8 DEPRESSION WITH ANXIETY: ICD-10-CM

## 2025-04-24 PROCEDURE — 3008F BODY MASS INDEX DOCD: CPT | Performed by: PSYCHIATRY & NEUROLOGY

## 2025-04-24 PROCEDURE — 3074F SYST BP LT 130 MM HG: CPT | Performed by: PSYCHIATRY & NEUROLOGY

## 2025-04-24 PROCEDURE — 3078F DIAST BP <80 MM HG: CPT | Performed by: PSYCHIATRY & NEUROLOGY

## 2025-04-24 PROCEDURE — 1123F ACP DISCUSS/DSCN MKR DOCD: CPT | Performed by: PSYCHIATRY & NEUROLOGY

## 2025-04-24 PROCEDURE — 99214 OFFICE O/P EST MOD 30 MIN: CPT | Performed by: PSYCHIATRY & NEUROLOGY

## 2025-04-24 PROCEDURE — 1160F RVW MEDS BY RX/DR IN RCRD: CPT | Performed by: PSYCHIATRY & NEUROLOGY

## 2025-04-24 PROCEDURE — 1159F MED LIST DOCD IN RCRD: CPT | Performed by: PSYCHIATRY & NEUROLOGY

## 2025-04-24 PROCEDURE — 1126F AMNT PAIN NOTED NONE PRSNT: CPT | Performed by: PSYCHIATRY & NEUROLOGY

## 2025-04-24 RX ORDER — VENLAFAXINE HYDROCHLORIDE 37.5 MG/1
37.5 CAPSULE, EXTENDED RELEASE ORAL DAILY
Qty: 90 CAPSULE | Refills: 1 | Status: SHIPPED | OUTPATIENT
Start: 2025-04-24 | End: 2025-10-21

## 2025-04-24 RX ORDER — VENLAFAXINE HYDROCHLORIDE 150 MG/1
150 CAPSULE, EXTENDED RELEASE ORAL DAILY
Qty: 90 CAPSULE | Refills: 1 | Status: SHIPPED | OUTPATIENT
Start: 2025-04-24 | End: 2025-10-21

## 2025-04-24 ASSESSMENT — PAIN SCALES - GENERAL: PAINLEVEL_OUTOF10: 0-NO PAIN

## 2025-04-24 NOTE — ASSESSMENT & PLAN NOTE
Diagnosis: anxiety and depression in substantial remission.    Treatment Plan:   The risks, benefits & alternatives to the medications prescribed were explained to you today. You were able to understand & repeat these risks, benefits & alternatives to this prescribed medication. You have agreed to proceed with treatment with the medications discussed based on the conclusion that the benefit outweighs the risks of this treatment regimen: continue venlafaxine 187.5 mg ER daily.   Orders:    venlafaxine XR (Effexor XR) 37.5 mg 24 hr capsule; Take 1 capsule (37.5 mg) by mouth once daily. Do not crush or chew.    venlafaxine XR (Effexor-XR) 150 mg 24 hr capsule; Take 1 capsule (150 mg) by mouth once daily. Do not crush or chew.

## 2025-04-24 NOTE — PROGRESS NOTES
"Subjective   Patient ID: Cindy Calvillo is a 75 y.o. female who presents for No chief complaint on file.I found out I have breast cancer but it is all been treated now and I am okay.     HPI: The patient states that she is much improved. The patient is at home with her son with whom she has a conflictual relationship. However, her relationship with her son is better now. The patient at this time is doing much better socializing and being active. She reports she has lost over 20 pounds and feels better losing the weight. She learned she has breast cancer but it has been treated and is now in remission.     Past Medical History:  No date: Anxiety  No date: Arthritis  No date: Depression  No date: Hypertension  No date: [place] on CPAP      Comment:  states she uses sometimes  No date: PONV (postoperative nausea and vomiting)  No date: Seasonal allergies  No date: Skin disorder      Comment:  eczema in the past  No date: Sleep apnea      Comment:  CPAP used \"sometimes\"  No date: Uterine cancer (Multi)  No date: Wears glasses    Review of patient's family history indicates:  Problem: Cervical cancer      Relation: Mother          Name:               Age of Onset: (Not Specified)  Problem: Cancer      Relation: Father          Name:               Age of Onset: (Not Specified)    MSE: The patient is alert, fully oriented, language is intact, and recent and remote memory intact. The patient denies any suicidal or homicidal ideation or plans. The patient presents with anxious and depressive features in substantial remission without manic or psychotic symptoms. Thought is logical and clear. Judgment and insight are limited. The patient has had more vegetative features which are now in remission.       Review of Systems   Neurological:         MSE: The patient is alert, fully oriented, language is intact, and recent and remote memory intact. The patient denies any suicidal or homicidal ideation or plans. The patient presents " with anxious and depressive features in substantial remission without manic or psychotic symptoms. Thought is logical and clear. Judgment and insight are limited. The patient has had more vegetative features which are now in remission.      Psychiatric/Behavioral:          MSE: The patient is alert, fully oriented, language is intact, and recent and remote memory intact. The patient denies any suicidal or homicidal ideation or plans. The patient presents with anxious and depressive features in substantial remission without manic or psychotic symptoms. Thought is logical and clear. Judgment and insight are limited. The patient has had more vegetative features which are now in remission.      All other systems reviewed and are negative.    Psych Review of Symptoms:    ADHD: Patient denied any symptoms.         Anxiety: Patient denied any symptoms.         Developmental and Sensory Concerns: Patient denied any symptoms.         Depressive Symptoms: Patient denied any symptoms.         Disruptive and Conduct Symptoms: Patient denied any symptoms.         Eating / Feeding Concerns: Patient denied any symptoms.         Elimination Symptoms: Patient denied any symptoms.         Manic Symptoms: Patient denied any symptoms.         Obsessive-Compulsive Symptoms: Patient denied any symptoms.         Psychotic Symptoms: Patient denied any symptoms.           Trauma Related Symptoms: Patient denied any symptoms.           Sleep Concerns: Patient denied any symptoms.           Objective   Physical Exam    Lab Review:   Hospital Outpatient Visit on 04/08/2025   Component Date Value    Treatment Site 04/08/2025 Right Breast     Course Number 04/08/2025 1     Prescribed Fractional Do* 04/08/2025 520     Prescribed Total Dose 04/08/2025 2,600     Actual Fractions Deliver* 04/08/2025 5     Actual Session Delivered* 04/08/2025 520     Actual Total Dose 04/08/2025 2,600     Prescribed Technique 04/08/2025 Opposed Tangential     Elapsed  Days 04/08/2025 6     Start Date 04/08/2025 4/2/2025     Last Date 04/08/2025 4/8/2025     Prescribed Number of Fra* 04/08/2025 5    Hospital Outpatient Visit on 04/07/2025   Component Date Value    Treatment Site 04/07/2025 Right Breast     Course Number 04/07/2025 1     Prescribed Fractional Do* 04/07/2025 520     Prescribed Total Dose 04/07/2025 2,600     Actual Fractions Deliver* 04/07/2025 4     Actual Session Delivered* 04/07/2025 520     Actual Total Dose 04/07/2025 2,080     Prescribed Technique 04/07/2025 Opposed Tangential     Elapsed Days 04/07/2025 5     Start Date 04/07/2025 4/2/2025     Last Date 04/07/2025 4/7/2025     Prescribed Number of Fra* 04/07/2025 5    Hospital Outpatient Visit on 04/04/2025   Component Date Value    Treatment Site 04/04/2025 Right Breast     Course Number 04/04/2025 1     Prescribed Fractional Do* 04/04/2025 520     Prescribed Total Dose 04/04/2025 2,600     Actual Fractions Deliver* 04/04/2025 3     Actual Session Delivered* 04/04/2025 520     Actual Total Dose 04/04/2025 1,560     Prescribed Technique 04/04/2025 Opposed Tangential     Elapsed Days 04/04/2025 2     Start Date 04/04/2025 4/2/2025     Last Date 04/04/2025 4/4/2025     Prescribed Number of Fra* 04/04/2025 5    Hospital Outpatient Visit on 04/03/2025   Component Date Value    Treatment Site 04/03/2025 Right Breast     Course Number 04/03/2025 1     Prescribed Fractional Do* 04/03/2025 520     Prescribed Total Dose 04/03/2025 2,600     Actual Fractions Deliver* 04/03/2025 2     Actual Session Delivered* 04/03/2025 520     Actual Total Dose 04/03/2025 1,040     Prescribed Technique 04/03/2025 Opposed Tangential     Elapsed Days 04/03/2025 1     Start Date 04/03/2025 4/2/2025     Last Date 04/03/2025 4/3/2025     Prescribed Number of Fra* 04/03/2025 5    Hospital Outpatient Visit on 04/02/2025   Component Date Value    Treatment Site 04/02/2025 Right Breast     Course Number 04/02/2025 1     Prescribed Fractional  Do* 04/02/2025 520     Prescribed Total Dose 04/02/2025 2,600     Actual Fractions Deliver* 04/02/2025 1     Actual Session Delivered* 04/02/2025 520     Actual Total Dose 04/02/2025 520     Prescribed Technique 04/02/2025 Opposed Tangential     Elapsed Days 04/02/2025 0     Start Date 04/02/2025 4/2/2025     Last Date 04/02/2025 4/2/2025     Prescribed Number of Fra* 04/02/2025 5    Admission on 02/03/2025, Discharged on 02/03/2025   Component Date Value    Case Report 02/03/2025                      Value:Surgical Pathology                                Case: [id number]                                  Authorizing Provider:  Jeramie SANDY MD           Collected:           02/03/2025 0829              Ordering Location:     [facility]   Received:            02/03/2025 0933                                     OR                                                                           Pathologist:           Jennifer Stuart MD, MS                                                            Specimen:    BREAST, EXCISION OF MASS RIGHT, RIGHT PARTIAL MASTECTOMY                                   FINAL DIAGNOSIS 02/03/2025                      Value:A. Right breast, mass, partial mastectomy:  - Invasive ductal carcinoma, grade 2. See synoptic report.  - Prior biopsy cavity and related changes are present.          02/03/2025                      Value:By the signature on this report, the individual or group listed as making the Final Interpretation/Diagnosis certifies that they have reviewed this case.       Case Summary Report 02/03/2025                      Value:INVASIVE CARCINOMA OF THE BREAST: Resection                            INVASIVE CARCINOMA OF THE BREAST: RESECTION - All Specimens                            8th Edition - Protocol posted: 6/19/2024                                                        SPECIMEN                               Procedure:    Excision (less than total mastectomy)                                 Specimen Laterality:    Right                                                         TUMOR                               Histologic Type:    Invasive carcinoma of no special type (ductal)                                Histologic Grade (Susie Histologic Score):                                     Glandular (Acinar) / Tubular Differentiation:    Score 2                                  Nuclear Pleomorphism:    Score 3                                  Mitotic Rate:    Score 1                                  Overall Grade:    Grade 2 (scores of 6 or 7)                                Tumor Size:    Greatest dimension of largest invasive focus (Millimeters): 9 mm                                 Additional Dimension (Millimeters):    8 mm                                 Additional Dimension (Millimeters):    6 mm                               Tumor Focality:    Single focus of invasive carcinoma                                Ductal Carcinoma In Situ (DCIS):    Not identified                                Lymphatic and / or Vascular Invasion:    Not identified                                Dermal Lymphatic and / or Vascular Invasion:    No skin present                                Microcalcifications:    Not identified                                Treatment Effect in the Breast:    No known presurgical therapy                                                         MARGINS                               Margin Status for Invasive Carcinoma:    All margins negative for invasive carcinoma                                  Distance from Invasive Carcinoma to Closest Margin:    Greater than: 2 mm                                                        REGIONAL LYMPH NODES                               Regional Lymph Node Status:    Not applicable (no regional lymph nodes submitted or found)                                                         pTNM CLASSIFICATION (AJCC 8th Edition)              "                  Reporting of pT, pN, and (when applicable) pM categories is based on information available to the pathologist at the time the report is issued. As per the AJCC (Chapter 1, 8th Ed.) it is the managing physician's responsibility to establish the final pathologic stage based upon all pertinent information, including but potentially not limited to this pathology report.                               pT Category:    pT1b                                pN Category:    pN not assigned (no nodes submitted or found)                                                         SPECIAL STUDIES                               Estrogen Receptor (ER) Status:    Positive (greater than 10% of cells demonstrate nuclear positivity)                                  Percentage of Cells with Nuclear Positivity:    > 95 %                               Progesterone Receptor (PgR) Status:    Positive                                  Percentage of Cells with Nuclear Positivity:    > 95 %                               HER2 (by immunohistochemistry):    Negative (Score 1+)                                Testing Performed on Case Number:    [id number]       Block for Additional Bio* 02/03/2025                      Value:Normal Block: A2  Tumor Block: A11      Clinical History 02/03/2025                      Value:Pre-op diagnosis:  Malignant neoplasm of upper-outer quadrant of right female breast, unspecified estrogen receptor status [C50.411]      Gross Description 02/03/2025                      Value:A. Received in formalin, labeled with the patient's name and hospital number, and \"RIGHT PARTIAL MASTECTOMY\" is an irregular segment of yellow lobulated fibrofatty tissue, 9.5 (S-I) x 6 (L-M) x 3 (A-P) cm. A skin ellipse is not present. The specimen is oriented with colored inks. A localizing needle is not identified. The specimen is inked in the following manner: anterior green, posterior black, superior orange, inferior blue, " medial yellow, and lateral red. The specimen is serially sectioned from superior to inferior into 24 slices. The cut surface reveals an irregular well-defined, tan-white lesion, measuring 0.9 x 0.8 x 0.6 cm in slices 20 and 21. The lesion is 0.5 cm from the anterior margin (slice 21), 1.5 cm from the posterior margin (slice 21), 7.3 cm from the superior margin, 1.6 cm from the inferior margin, 0.7 cm from the lateral margin (slice 21), and 1.7 cm from the medial margin (slice 20).  A hemorrhagic biopsy cavity is identified in slices 19 to 22 and is located 0.3                           cm from the anterior margin (slice 20). A mag seed is identified in slice 19. A biopsy coil clip is identified in slice 20. The remainder of the breast parenchyma is unremarkable fibroadipose tissue. No other lesions are identified.   Representative sections including the entire lesion are submitted in 19 cassettes.  KAT    NOTE:  Ischemia time: 2/3/2025 8:29 am.  This specimen was placed into formalin at: 2/3/2025 8:36 am.     Summary of Cassettes:  Specimen Label Site  A  1-6 Superior margin, perpendicular sectioned, slice 1     7 Adjacent superior area of biopsy cavity, with anterior and posterior margins, middle part of slice 18    8 Adjacent superior area of lesion, with anterior and posterior margins, middle part of slice 19, mag seed    9 Biopsy cavity, biopsy clip, lesion with anterior, posterior and lateral margins, lower part of slice 20    10 Adjacent medial area of lesion with closest medial margin, upper part of slice 20  11 Biopsy cavity, lesion with closest anterior,                           closest posterior and closest lateral margins, lower part of slice 21  12 Adjacent medial area of lesion with medial margin, upper part of slice 21  13 Biopsy cavity with anterior, posterior and lateral margins, lower part of slice 22   14 Adjacent medial area of biopsy cavity with anterior, posterior and medial margins, upper part  of slice 22  15-16 Adjacent inferior to biopsy cavity, slice 23, bisected   17-19  Inferior margin, perpendicular sectioned, slice 24     Hospital Outpatient Visit on 01/30/2025   Component Date Value    Ventricular Rate 01/30/2025 63     Atrial Rate 01/30/2025 63     MD Interval 01/30/2025 136     QRS Duration 01/30/2025 88     QT Interval 01/30/2025 414     QTC Calculation(Bazett) 01/30/2025 423     P Axis 01/30/2025 24     R Axis 01/30/2025 24     T Axis 01/30/2025 22     QRS Count 01/30/2025 10     Q Onset 01/30/2025 218     P Onset 01/30/2025 150     P Offset 01/30/2025 199     T Offset 01/30/2025 425     QTC Fredericia 01/30/2025 420    Hospital Outpatient Visit on 12/06/2024   Component Date Value    Case Report 12/06/2024                      Value:Surgical Pathology                                Case: [id number]                                  Authorizing Provider:  Dulce Biggs MD         Collected:           12/06/2024 0922              Ordering Location:     [facility]   Received:            12/06/2024 1029              Pathologist:           Jennifer Stuart MD, MS                                                            Specimen:    BREAST CORE BIOPSY RIGHT, 0920                                                             FINAL DIAGNOSIS 12/06/2024                      Value:A. Right breast, mass, ultrasound-guided core needle biopsy:  - Invasive ductal carcinoma, preliminary histologic grade 2. See comment.         12/06/2024                      Value:By the signature on this report, the individual or group listed as making the Final Interpretation/Diagnosis certifies that they have reviewed this case.       Comment 12/06/2024                      Value:In this limited sample, the invasive carcinoma measures up to 4.5 mm in greatest dimension. ER, MD, and HER2 are ordered and will be reported in an addendum.       Case Summary Report 12/06/2024                      Value:Breast  Biomarker Reporting Template                            (Added in Addendum) BREAST BIOMARKER REPORTING TEMPLATE - All Specimens                            Protocol posted: 12/13/2023                                                           Test(s) Performed:                                     Estrogen Receptor (ER) Status:    Positive (greater than 10% of cells demonstrate nuclear positivity)                                    Percentage of Cells with Nuclear Positivity:    > 95 %                                   Average Intensity of Staining:    Strong                                  Test Type:    Food and Drug Administration (FDA) cleared (test / vendor)                                  Primary Antibody:    SP1                                Test(s) Performed:                                     Progesterone Receptor (PgR) Status:    Positive                                    Percentage of Cells with Nuclear Positivity:    > 95 %                                   Average Intensity of Staining:    Strong                                  Test Type:    Food and Drug Administration (FDA) cleared (test / vendor)                                  Primary Antibody:    1E2                                Test(s) Performed:                                     HER2 by Immunohistochemistry:    Negative (Score 1+)                                  Test Type:    Food and Drug Administration (FDA) cleared (test / vendor)                                  Primary Antibody:    4B5                                Cold Ischemia and Fixation Times:    Meet requirements specified in latest version of the ASCO / CAP Guidelines                                Testing Performed on Block Number(s):    [id number]       Clinical History 12/06/2024                      Value:right breast mass      Gross Description 12/06/2024                      Value:Received in formalin, labeled with the patient´s name and hospital number, are multiple  irregular/cylindrical segments of yellow-white fatty soft tissue aggregating to 1.9 x 1.3 x 0.3 cm.  The specimen is submitted in toto in one cassette.  MARY    NOTE:  Ischemia time: not provided.  This specimen was placed into formalin at: 9:22.     Lab on 10/26/2024   Component Date Value    Color, Urine 10/26/2024 Light-Yellow     Appearance, Urine 10/26/2024 Clear     Specific Gravity, Urine 10/26/2024 1.019     pH, Urine 10/26/2024 5.5     Protein, Urine 10/26/2024 NEGATIVE     Glucose, Urine 10/26/2024 Normal     Blood, Urine 10/26/2024 NEGATIVE     Ketones, Urine 10/26/2024 NEGATIVE     Bilirubin, Urine 10/26/2024 NEGATIVE     Urobilinogen, Urine 10/26/2024 Normal     Nitrite, Urine 10/26/2024 NEGATIVE     Leukocyte Esterase, Urine 10/26/2024 NEGATIVE     Extra Tube 10/26/2024 Hold for add-ons.        Assessment/Plan   Psychiatric Risk Assessment  Violence Risk Assessment: none  Acute Risk of Harm to Others is Considered: low   Suicide Risk Assessment: age > 65 yrs old and   Protective Factors against Suicide: adherence to  treatment, fear of suicide, moral objections to suicide, positive family relationships, and sense of responsibility toward family  Acute Risk of Harm to Self is Considered: low    Imminent Risk of Suicide or Serious Self-Injury: Low   Chronic Risk of Suicide of Serious Self-Injury: Low  Risk factors: Age, depression history and   Protective factors: Denies current suicidal ideation, denies history of suicide attempts , willingness to seek help and support , gender, access to a variety of clinical interventions , and receiving and engaged in care for mental, physical, and substance use disorders      Imminent Risk of Violence or Homicide: Low   Risk Factors: No significant risk factors identified on screening  Protective Factors: Lack of known history of harm to others , Lack of known history of violent ideation , and lack of known access to firearms.     Assessment &  Plan  Depression with anxiety  Diagnosis: anxiety and depression in substantial remission.    Treatment Plan:   The risks, benefits & alternatives to the medications prescribed were explained to you today. You were able to understand & repeat these risks, benefits & alternatives to this prescribed medication. You have agreed to proceed with treatment with the medications discussed based on the conclusion that the benefit outweighs the risks of this treatment regimen: continue venlafaxine 187.5 mg ER daily.   Orders:    venlafaxine XR (Effexor XR) 37.5 mg 24 hr capsule; Take 1 capsule (37.5 mg) by mouth once daily. Do not crush or chew.    venlafaxine XR (Effexor-XR) 150 mg 24 hr capsule; Take 1 capsule (150 mg) by mouth once daily. Do not crush or chew.    Follow up in October of 2025.    Time:   Prep time on date of the patient encounter: 5 minutes.   Time spent directly with patient/family/caregiver: 20 minutes.   Additional time spent on patient care activities:  minutes.   Documentation time: 5 minutes.   Total time on date of patient encounter: 30 minutes.

## 2025-05-06 ENCOUNTER — HOSPITAL ENCOUNTER (OUTPATIENT)
Dept: RADIATION ONCOLOGY | Facility: CLINIC | Age: 75
Setting detail: RADIATION/ONCOLOGY SERIES
Discharge: HOME | End: 2025-05-06
Payer: MEDICARE

## 2025-05-06 VITALS
SYSTOLIC BLOOD PRESSURE: 138 MMHG | TEMPERATURE: 95.9 F | RESPIRATION RATE: 18 BRPM | HEART RATE: 87 BPM | BODY MASS INDEX: 39.11 KG/M2 | DIASTOLIC BLOOD PRESSURE: 78 MMHG | WEIGHT: 213.85 LBS | OXYGEN SATURATION: 97 %

## 2025-05-06 DIAGNOSIS — C50.411 MALIGNANT NEOPLASM OF UPPER-OUTER QUADRANT OF RIGHT BREAST IN FEMALE, ESTROGEN RECEPTOR POSITIVE: Primary | ICD-10-CM

## 2025-05-06 DIAGNOSIS — Z17.0 MALIGNANT NEOPLASM OF UPPER-OUTER QUADRANT OF RIGHT BREAST IN FEMALE, ESTROGEN RECEPTOR POSITIVE: Primary | ICD-10-CM

## 2025-05-06 DIAGNOSIS — B37.2 CANDIDAL INTERTRIGO: ICD-10-CM

## 2025-05-06 LAB
25(OH)D3+25(OH)D2 SERPL-MCNC: 33 NG/ML (ref 30–100)
ALBUMIN SERPL-MCNC: 4.4 G/DL (ref 3.6–5.1)
ALP SERPL-CCNC: 54 U/L (ref 37–153)
ALT SERPL-CCNC: 26 U/L (ref 6–29)
AMYLASE SERPL-CCNC: 34 U/L (ref 21–101)
ANION GAP SERPL CALCULATED.4IONS-SCNC: 11 MMOL/L (CALC) (ref 7–17)
AST SERPL-CCNC: 18 U/L (ref 10–35)
BASOPHILS # BLD AUTO: 53 CELLS/UL (ref 0–200)
BASOPHILS NFR BLD AUTO: 0.8 %
BILIRUB SERPL-MCNC: 1 MG/DL (ref 0.2–1.2)
BUN SERPL-MCNC: 24 MG/DL (ref 7–25)
CALCIUM SERPL-MCNC: 9.6 MG/DL (ref 8.6–10.4)
CHLORIDE SERPL-SCNC: 104 MMOL/L (ref 98–110)
CHOLEST SERPL-MCNC: 257 MG/DL
CHOLEST/HDLC SERPL: 6.4 (CALC)
CO2 SERPL-SCNC: 25 MMOL/L (ref 20–32)
CREAT SERPL-MCNC: 0.7 MG/DL (ref 0.6–1)
EGFRCR SERPLBLD CKD-EPI 2021: 91 ML/MIN/1.73M2
EOSINOPHIL # BLD AUTO: 119 CELLS/UL (ref 15–500)
EOSINOPHIL NFR BLD AUTO: 1.8 %
ERYTHROCYTE [DISTWIDTH] IN BLOOD BY AUTOMATED COUNT: 13.3 % (ref 11–15)
EST. AVERAGE GLUCOSE BLD GHB EST-MCNC: 131 MG/DL
EST. AVERAGE GLUCOSE BLD GHB EST-SCNC: 7.3 MMOL/L
FOLATE SERPL-MCNC: 13.2 NG/ML
GLUCOSE SERPL-MCNC: 170 MG/DL (ref 65–99)
HBA1C MFR BLD: 6.2 %
HCT VFR BLD AUTO: 43.5 % (ref 35–45)
HDLC SERPL-MCNC: 40 MG/DL
HGB BLD-MCNC: 14.3 G/DL (ref 11.7–15.5)
LDLC SERPL CALC-MCNC: 187 MG/DL (CALC)
LIPASE SERPL-CCNC: 53 U/L (ref 7–60)
LYMPHOCYTES # BLD AUTO: 1241 CELLS/UL (ref 850–3900)
LYMPHOCYTES NFR BLD AUTO: 18.8 %
MAGNESIUM SERPL-MCNC: 2 MG/DL (ref 1.5–2.5)
MCH RBC QN AUTO: 30.9 PG (ref 27–33)
MCHC RBC AUTO-ENTMCNC: 32.9 G/DL (ref 32–36)
MCV RBC AUTO: 94 FL (ref 80–100)
MONOCYTES # BLD AUTO: 290 CELLS/UL (ref 200–950)
MONOCYTES NFR BLD AUTO: 4.4 %
NEUTROPHILS # BLD AUTO: 4897 CELLS/UL (ref 1500–7800)
NEUTROPHILS NFR BLD AUTO: 74.2 %
NONHDLC SERPL-MCNC: 217 MG/DL (CALC)
PLATELET # BLD AUTO: 207 THOUSAND/UL (ref 140–400)
PMV BLD REES-ECKER: 10 FL (ref 7.5–12.5)
POTASSIUM SERPL-SCNC: 4.2 MMOL/L (ref 3.5–5.3)
PROT SERPL-MCNC: 7.2 G/DL (ref 6.1–8.1)
RBC # BLD AUTO: 4.63 MILLION/UL (ref 3.8–5.1)
SODIUM SERPL-SCNC: 140 MMOL/L (ref 135–146)
TRIGL SERPL-MCNC: 154 MG/DL
TSH SERPL-ACNC: 1.38 MIU/L (ref 0.4–4.5)
VIT B12 SERPL-MCNC: >2000 PG/ML (ref 200–1100)
WBC # BLD AUTO: 6.6 THOUSAND/UL (ref 3.8–10.8)

## 2025-05-06 PROCEDURE — 99214 OFFICE O/P EST MOD 30 MIN: CPT | Performed by: NURSE PRACTITIONER

## 2025-05-06 RX ORDER — NYSTATIN 100000 U/G
CREAM TOPICAL 2 TIMES DAILY
Qty: 30 G | Refills: 1 | Status: SHIPPED | OUTPATIENT
Start: 2025-05-06

## 2025-05-06 ASSESSMENT — PATIENT HEALTH QUESTIONNAIRE - PHQ9
SUM OF ALL RESPONSES TO PHQ9 QUESTIONS 1 AND 2: 0
1. LITTLE INTEREST OR PLEASURE IN DOING THINGS: NOT AT ALL
2. FEELING DOWN, DEPRESSED OR HOPELESS: NOT AT ALL

## 2025-05-06 ASSESSMENT — PAIN SCALES - GENERAL: PAINLEVEL_OUTOF10: 0-NO PAIN

## 2025-05-06 NOTE — PROGRESS NOTES
Radiation Oncology Follow-Up    Patient Name:  Cindy Calvillo  MRN:  [id number]  :  1950    Referring Provider: Sherry Olmedo MD  Primary Care Provider: Chilo Graves MD  Care Team: Patient Care Team:  Chilo Graves MD as PCP - General  Sherry Olmedo MD as Consulting Physician (Hematology and Oncology)    Date of Service: 2025   76-year-old with RIGHT breast pT1bNx IDC [id number] (0.9 cm, grade 2, no LVSI, no DCIS, margins negative, no SLN Bx) treated with partial mastectomy.      Diagnostic/Therapeutic History:     - 10/31/2024: Right breast focal asymmetry. Diagnostic mammogram recommended.  Ultrasound may be indicated- BIRADS category 0     - 2024: follow up diagnostic imaging: Right breast indeterminate 9 mm heterogeneously hyperechoic lesion located at the 9:30 position 5 cm from nipple edge. US axilla showed 2 morphololgically normal  lymph nodes      - 2024: Right breast  mass ultrasound guided core needle biopsy showed IDC G2 ER: 95%, NJ: 95%, HER2 negative 1+      -2025: Dr Garcia performed a right breast PM with path showing grade 2 , 9 mm focus of IDC. All margins are negative. Nodes not assessed. Final stage pT1bNx: ER: 95%, NJ: 95%, HER2 negative (1+)     - She elected to receive adjuvant radiation therapy.  She completed right whole breast radiation therapy 26 Gy in 5 fractions on 2025.     Radiation Treatment Summary:            Radiation Therapy     Treatment Period Technique Fraction Dose Fractions Total Dose   Course 1 2025-2025  (days elapsed: 6)         Right Breast 2025-2025 Opposed Tangential 520 / 520 cGy  2,600 / 2,600 cGy     Adjuvant endocrine therapy initiated with anastrozole.     SUBJECTIVE  History of Present Illness:   Cindy Calvillo is here today for radiation follow up/initial radiation survivorship visit.  She says she is doing well and right breast healing well.  She has some itching and candida type  "rash intertrigo region right breast. Endorses occasional \"twinge\" feeling right breast. No swelling of right arm or difficulty with ROM of right arm.  She started anastrozole and no adverse effects.  She has known osteoporosis and taking aledronate, Vit D supp.  No headaches, fever, chills, cough, SOB, chest pain, n/v/c/d or bony pain. Endorses mild fatigue but improving.     Review of Systems:    Review of Systems   All other systems reviewed and are negative.    Performance Status:   The Karnofsky performance scale today is 90, Able to carry on normal activity; minor signs or symptoms of disease (ECOG equivalent 0).      OBJECTIVE  Vital Signs:  /78 (BP Location: Right arm, Patient Position: Sitting, BP Cuff Size: Large adult)   Pulse 87   Temp 35.5 °C (95.9 °F) (Temporal)   Resp 18   Wt 97 kg (213 lb 13.5 oz)   SpO2 97%   BMI 39.11 kg/m²    Current Medications[1]     Physical Exam  Vitals reviewed.   Constitutional:       Appearance: Normal appearance.   HENT:      Head: Normocephalic and atraumatic.      Nose: Nose normal.      Mouth/Throat:      Mouth: Mucous membranes are moist.      Pharynx: Oropharynx is clear.   Eyes:      Conjunctiva/sclera: Conjunctivae normal.      Pupils: Pupils are equal, round, and reactive to light.   Cardiovascular:      Rate and Rhythm: Normal rate and regular rhythm.      Heart sounds: Normal heart sounds.   Pulmonary:      Effort: Pulmonary effort is normal.      Breath sounds: Normal breath sounds.   Chest:   Breasts:     Right: No swelling, inverted nipple, mass or nipple discharge.      Left: Normal. No swelling, inverted nipple, mass, nipple discharge or skin change.          Comments: Right breast with well healed surgical incision.  Skin intact except for very mild peeling in the inframammary fold with candida type rash.  Mild tanning.   Abdominal:      Palpations: Abdomen is soft.   Musculoskeletal:         General: No swelling. Normal range of motion.      " Cervical back: Normal range of motion and neck supple.   Lymphadenopathy:      Cervical: No cervical adenopathy.      Upper Body:      Right upper body: No supraclavicular or axillary adenopathy.      Left upper body: No supraclavicular or axillary adenopathy.   Skin:     General: Skin is warm and dry.   Neurological:      General: No focal deficit present.      Mental Status: She is alert and oriented to person, place, and time.   Psychiatric:         Mood and Affect: Mood normal.         Behavior: Behavior normal.         RESULTS:    Narrative   Interpreted By:  MAMADOU GUILLEN MD  MRN: [id number]  Patient Name: NATO REGALADO     STUDY:  BONE DENSITY, DEXA 1 OR MORE SITES: AXIAL SKELETN8/15/2023 9:10 am     INDICATION:  Screening for osteoporosis.  High risk, morbid obesity, ataxia,  vitamin D deficiency. Thank you.. The patient is a 72 y/o  year old F.     COMPARISON:  None.     ACCESSION NUMBER(S):  [id number]     ORDERING CLINICIAN:  LAKESHIA MEANS     TECHNIQUE:  BONE DENSITY, DEXA 1 OR MORE SITES: AXIAL SKELETN     FINDINGS:  SPINE L2-L3  Bone Mineral Density: 1.086  T-Score -1.0  Z-Score -0.5  Classification:  Not reported     LEFT FEMUR -TOTAL  Bone Mineral Density: 0.813  T-Score -1.5   Z-Score  -0.9    LEFT FEMUR -NECK  Bone Mineral Density: 0.692  T-Score -2.5  Z-Score -1.5  Classification:  Not reported    World Health Organization (WHO) criteria for post-menopausal,   Women:  Normal:         T-score at or above -1 SD  Osteopenia:   T-score between -1 and -2.5 SD  Osteoporosis: T-score at or below -2.5 SD    10-year Fracture Risk:  Major Osteoporotic Fracture  Not reported  Hip Fracture                        Not reported     Note:  If no FRAX score is reported, it is because:  Some T-score for Spine Total or Hip Total or Femoral Neck at or below  -2.5     Impression   DEXA:  According to World Health Organization criteria,  classification is borderline  osteoporosis.     ASSESSMENT:  75  y.o. female with early stage right breast cancer s/p breast conserving surgery followed by radiation.  Doing well and cosmesis is good. Reviewed skin care, possible late effects of treatment, anastrozole side effects and follow up plan.     PLAN:   - Mammogram and FUV with Dr. Garcia   - Continue anastrozole and follow up with Dr. Olmedo  - Radiation follow up in 6 mo.  Call with any questions or concerns.   - RX for Nystatin cream for candida intertrigo sent to her pharmacy.     Zena Blair CNP  [phone]       [1]   Current Outpatient Medications:     albuterol 90 mcg/actuation inhaler, Inhale 2 puffs every 4 hours if needed for shortness of breath or wheezing., Disp: 18 g, Rfl: 2    alendronate (Fosamax) 35 mg tablet, Take 1 tablet (35 mg) by mouth every 7 days. (Patient not taking: Reported on 4/16/2025), Disp: 13 tablet, Rfl: 1    anastrozole (Arimidex) 1 mg tablet, Take 1 tablet (1 mg total) by mouth once daily.  Swallow whole with a drink of water., Disp: 360 tablet, Rfl: 0    cetirizine (ZyrTEC) 10 mg tablet, Take 1 tablet (10 mg) by mouth once daily as needed., Disp: , Rfl:     cholecalciferol (Vitamin D-3) 50,000 unit capsule, PLEASE TAKE 1 CAPSULE BY MOUTH SUNDAYS WITH LUNCH AND A FULL GLASS OF WATER. THANK YOU. (Patient not taking: Reported on 4/16/2025), Disp: 13 capsule, Rfl: 0    cyanocobalamin (Vitamin B-12) 1,000 mcg tablet, Take 1 tablet (1,000 mcg) by mouth once daily., Disp: , Rfl:     dilTIAZem ER (Tiazac) 120 mg 24 hr capsule, Take 1 capsule (120 mg) by mouth once daily., Disp: 90 capsule, Rfl: 1    famotidine (Pepcid) 20 mg tablet, Please take 1 tablet by mouth before breakfast time, and again 1 tablet by mouth before suppertime.  Please take twice a day, even if you are not going to eat.  Thank you., Disp: 180 tablet, Rfl: 1    fluticasone (Flonase) 50 mcg/actuation nasal spray, Use 2 sprays into each nostril after breakfast, and use 2 sprays into each nostril after supper.  Thank you.,  Disp: 16 mL, Rfl: 5    lisinopril 5 mg tablet, Please take 1 tablet by mouth with SUPPER every evening.  Thank you., Disp: 90 tablet, Rfl: 1    metoprolol succinate XL (Toprol-XL) 25 mg 24 hr tablet, Please take ONLY HALF TABLET by mouth with SUPPER every evening.  Do not crush or chew.  Thank you., Disp: 45 tablet, Rfl: 1    multivitamin tablet, Take 1 tablet by mouth once daily., Disp: , Rfl:     phentermine (Adipex-P) 37.5 mg tablet, Please take 1 tablet by mouth with BREAKFAST every day, no later.  Lots of fluids throughout the day.  Thank you., Disp: 30 tablet, Rfl: 0    saccharomyces boulardii (Florastor) 250 mg capsule, Take 1 capsule (250 mg) by mouth 2 times a day. (Patient not taking: Reported on 4/16/2025), Disp: , Rfl:     tiZANidine (Zanaflex) 2 mg tablet, Please take 1 tablet by mouth every 6 hours as needed for musculoskeletal pain.  Watch out for daytime sleepiness.  No driving if drowsy.  Do not take with alcohol.  Thank you. (Patient not taking: Reported on 4/16/2025), Disp: 45 tablet, Rfl: 0    triamcinolone (Kenalog) 0.1 % ointment, Please apply thin layer to rashes 2 times a day.  Do not cover with bandage.  Drink lots of fluids throughout the day.  Thank you. (Patient not taking: Reported on 4/16/2025), Disp: 80 g, Rfl: 0    venlafaxine XR (Effexor XR) 37.5 mg 24 hr capsule, Take 1 capsule (37.5 mg) by mouth once daily. Do not crush or chew., Disp: 90 capsule, Rfl: 1    venlafaxine XR (Effexor-XR) 150 mg 24 hr capsule, Take 1 capsule (150 mg) by mouth once daily. Do not crush or chew., Disp: 90 capsule, Rfl: 1

## 2025-05-07 LAB
APPEARANCE UR: ABNORMAL
BACTERIA #/AREA URNS HPF: ABNORMAL /HPF
BACTERIA UR CULT: ABNORMAL
BACTERIA UR CULT: ABNORMAL
BILIRUB UR QL STRIP: NEGATIVE
CAOX CRY #/AREA URNS HPF: ABNORMAL /HPF
COLOR UR: ABNORMAL
GLUCOSE UR QL STRIP: NEGATIVE
HGB UR QL STRIP: NEGATIVE
HYALINE CASTS #/AREA URNS LPF: ABNORMAL /LPF
KETONES UR QL STRIP: NEGATIVE
LEUKOCYTE ESTERASE UR QL STRIP: ABNORMAL
NITRITE UR QL STRIP: NEGATIVE
PH UR STRIP: 5.5 [PH] (ref 5–8)
PROT UR QL STRIP: ABNORMAL
RBC #/AREA URNS HPF: ABNORMAL /HPF
SERVICE CMNT-IMP: ABNORMAL
SP GR UR STRIP: 1.03 (ref 1–1.03)
SQUAMOUS #/AREA URNS HPF: ABNORMAL /HPF
WBC #/AREA URNS HPF: ABNORMAL /HPF

## 2025-06-16 ENCOUNTER — APPOINTMENT (OUTPATIENT)
Dept: PRIMARY CARE | Facility: CLINIC | Age: 75
End: 2025-06-16
Payer: MEDICARE

## 2025-06-16 VITALS
DIASTOLIC BLOOD PRESSURE: 57 MMHG | SYSTOLIC BLOOD PRESSURE: 119 MMHG | BODY MASS INDEX: 40.48 KG/M2 | OXYGEN SATURATION: 98 % | HEART RATE: 71 BPM | WEIGHT: 220 LBS | HEIGHT: 62 IN

## 2025-06-16 DIAGNOSIS — C50.411 MALIGNANT NEOPLASM OF UPPER-OUTER QUADRANT OF RIGHT BREAST IN FEMALE, ESTROGEN RECEPTOR POSITIVE: ICD-10-CM

## 2025-06-16 DIAGNOSIS — E55.9 VITAMIN D DEFICIENCY: ICD-10-CM

## 2025-06-16 DIAGNOSIS — Z12.11 SCREENING FOR COLON CANCER: ICD-10-CM

## 2025-06-16 DIAGNOSIS — Z00.00 ROUTINE GENERAL MEDICAL EXAMINATION AT HEALTH CARE FACILITY: Primary | ICD-10-CM

## 2025-06-16 DIAGNOSIS — F33.1 MODERATE EPISODE OF RECURRENT MAJOR DEPRESSIVE DISORDER: ICD-10-CM

## 2025-06-16 DIAGNOSIS — R63.5 WEIGHT GAIN: ICD-10-CM

## 2025-06-16 DIAGNOSIS — Z17.0 MALIGNANT NEOPLASM OF UPPER-OUTER QUADRANT OF RIGHT BREAST IN FEMALE, ESTROGEN RECEPTOR POSITIVE: ICD-10-CM

## 2025-06-16 DIAGNOSIS — M81.0 AGE-RELATED OSTEOPOROSIS WITHOUT CURRENT PATHOLOGICAL FRACTURE: ICD-10-CM

## 2025-06-16 DIAGNOSIS — G47.33 OBSTRUCTIVE SLEEP APNEA ON CPAP: ICD-10-CM

## 2025-06-16 DIAGNOSIS — R73.03 PREDIABETES: ICD-10-CM

## 2025-06-16 DIAGNOSIS — E66.01 MORBID OBESITY WITH BMI OF 40.0-44.9, ADULT (MULTI): ICD-10-CM

## 2025-06-16 DIAGNOSIS — H90.3 SENSORINEURAL HEARING LOSS (SNHL) OF BOTH EARS: ICD-10-CM

## 2025-06-16 DIAGNOSIS — N39.41 URGE INCONTINENCE: ICD-10-CM

## 2025-06-16 DIAGNOSIS — E78.2 MIXED HYPERLIPIDEMIA: ICD-10-CM

## 2025-06-16 DIAGNOSIS — Z71.89 CARDIAC RISK COUNSELING: ICD-10-CM

## 2025-06-16 DIAGNOSIS — E53.8 VITAMIN B12 DEFICIENCY: ICD-10-CM

## 2025-06-16 DIAGNOSIS — I10 ESSENTIAL HYPERTENSION: ICD-10-CM

## 2025-06-16 PROCEDURE — 1160F RVW MEDS BY RX/DR IN RCRD: CPT | Performed by: INTERNAL MEDICINE

## 2025-06-16 PROCEDURE — 1170F FXNL STATUS ASSESSED: CPT | Performed by: INTERNAL MEDICINE

## 2025-06-16 PROCEDURE — G0439 PPPS, SUBSEQ VISIT: HCPCS | Performed by: INTERNAL MEDICINE

## 2025-06-16 PROCEDURE — 1123F ACP DISCUSS/DSCN MKR DOCD: CPT | Performed by: INTERNAL MEDICINE

## 2025-06-16 PROCEDURE — 99213 OFFICE O/P EST LOW 20 MIN: CPT | Performed by: INTERNAL MEDICINE

## 2025-06-16 PROCEDURE — 3074F SYST BP LT 130 MM HG: CPT | Performed by: INTERNAL MEDICINE

## 2025-06-16 PROCEDURE — 1036F TOBACCO NON-USER: CPT | Performed by: INTERNAL MEDICINE

## 2025-06-16 PROCEDURE — 3078F DIAST BP <80 MM HG: CPT | Performed by: INTERNAL MEDICINE

## 2025-06-16 PROCEDURE — 1159F MED LIST DOCD IN RCRD: CPT | Performed by: INTERNAL MEDICINE

## 2025-06-16 PROCEDURE — 3008F BODY MASS INDEX DOCD: CPT | Performed by: INTERNAL MEDICINE

## 2025-06-16 RX ORDER — PHENTERMINE HYDROCHLORIDE 37.5 MG/1
TABLET ORAL
Qty: 30 TABLET | Refills: 0 | Status: SHIPPED | OUTPATIENT
Start: 2025-06-16

## 2025-06-16 ASSESSMENT — ENCOUNTER SYMPTOMS
OCCASIONAL FEELINGS OF UNSTEADINESS: 0
DEPRESSION: 0
LOSS OF SENSATION IN FEET: 0

## 2025-06-16 ASSESSMENT — ACTIVITIES OF DAILY LIVING (ADL)
TAKING_MEDICATION: INDEPENDENT
DRESSING: INDEPENDENT
MANAGING_FINANCES: INDEPENDENT
BATHING: INDEPENDENT
GROCERY_SHOPPING: INDEPENDENT
DOING_HOUSEWORK: INDEPENDENT

## 2025-06-16 ASSESSMENT — PATIENT HEALTH QUESTIONNAIRE - PHQ9
SUM OF ALL RESPONSES TO PHQ9 QUESTIONS 1 AND 2: 1
2. FEELING DOWN, DEPRESSED OR HOPELESS: SEVERAL DAYS
1. LITTLE INTEREST OR PLEASURE IN DOING THINGS: NOT AT ALL
2. FEELING DOWN, DEPRESSED OR HOPELESS: SEVERAL DAYS
1. LITTLE INTEREST OR PLEASURE IN DOING THINGS: NOT AT ALL
SUM OF ALL RESPONSES TO PHQ9 QUESTIONS 1 AND 2: 1

## 2025-06-16 NOTE — PATIENT INSTRUCTIONS
Thank you very much for coming.  It is nice to see you again.    We did your Medicare wellness evaluation today, and you seem to be doing well.  You just need to continue to follow with your BEHAVIORIST, so that you can stay motivated to take care of yourself.    I do appreciate that you would like to do something about your WEIGHT.  Please eat more sensibly.  [employer] diet book, DASH diet, Mediterranean diet for ideas.  We will talk about seeing a NUTRITIONIST or DIETITIAN if you would like.    Also, please get yourself motivated to do more BRISK WALKING, at least 10 minutes in the morning, 10 minutes after lunch, and again 10 minutes in the afternoon, for a minimum of 30 minutes a day, every day.  All of these will contribute to better metabolism.    This will also help you lose weight with the help of PHENTERMINE/ADIPEX.  Please take with BREAKFAST, no later.  Watch out for dry mouth.  Drink lots of fluids throughout the day.  Watch out for constipation.  Consider METAMUCIL.  Stop taking the medication and call me if you have any challenges.    Phentermine/Adipex can also cause insomnia.  Take with breakfast, no later please.    You have taken phentermine/Adipex in the past successfully and without any significant side effects.  Lets see how you do this time around.    Please do not forget to get your COLONOSCOPY done.    We reviewed your LIVING WILL wishes and your CODE STATUS.  We will keep your SON, Benjamin, as your DURABLE POWER OF  for healthcare matters.  This means that if anything were to happen to you, and if you are unable to express your wishes, he will be the 1 whom we will depend on to speak on your behalf.  He will be able to make medical decisions for you.    Your CODE STATUS is FULL CODE, meaning that we will do everything necessary to save your life and to preserve it.  Afterwards, when you are stable, we will discuss with you and your family how you are doing and what your options  are.    At the very least, we will always try our best to maintain your quality of life and keep you comfortable.    Please come back next month, so that we can see how you are doing with PHENTERMINE/ADIPEX.    Thank you for updating me on your VACCINATION profile.  I will update your chart.    Again, thank you very much for coming.  Take care and God bless.  See you in 1 month.  Call or text sooner please as needed.            0  Return in 1 months.  20 minutes please.  Phentermine/Adipex No. 2.  Reassess mood, energy, function, motivation, efforts regarding diet and exercise.  Coordinate with behaviorist.  Consider referral to nutritionist.  Watch out for any increase in urinary symptoms, suggestive urine findings.            0

## 2025-06-16 NOTE — PROGRESS NOTES
"Subjective   Reason for Visit: Cindy Calvillo is an 75 y.o. female here for a Medicare Wellness visit.     Past Medical, Surgical, and Family History reviewed and updated in chart.    Reviewed all medications by prescribing practitioner or clinical pharmacist (such as prescriptions, OTCs, herbal therapies and supplements) and documented in the medical record.    HPI    Patient Care Team:  Chilo Graves MD as PCP - General  Chilo Graves MD as PCP - O Medicare Advantage PCP  Sherry Olmedo MD as Consulting Physician (Hematology and Oncology)     Review of Systems    Objective   Vitals:  /57 (BP Location: Left arm, Patient Position: Sitting, BP Cuff Size: Adult)   Pulse 71   Ht 1.575 m (5' 2\")   Wt 99.8 kg (220 lb)   SpO2 98%   BMI 40.24 kg/m²       Physical Exam        ASSESSMENT/Plans:  Cindy was seen today for medicare annual wellness visit subsequent.  Diagnoses and all orders for this visit:  Routine general medical examination at health care facility (Primary)  -     1 Year Follow Up In Primary Care - Wellness Exam; Future  -     Follow Up In Primary Care - [facility]; Future  Weight gain  -     Follow Up In Primary Care - [facility]; Future  Morbid obesity with BMI of 40.0-44.9, adult (Multi)  -     Follow Up In Primary Care - Established  -     phentermine (Adipex-P) 37.5 mg tablet; Please take 1 tablet by mouth with BREAKFAST, no later.  Lots of fluids please throughout the day.  Thank you.  -     Follow Up In Primary Care - [facility]; Future  Prediabetes  -     phentermine (Adipex-P) 37.5 mg tablet; Please take 1 tablet by mouth with BREAKFAST, no later.  Lots of fluids please throughout the day.  Thank you.  -     Follow Up In Primary Care - Established; Future  Essential hypertension  -     Follow Up In Primary Care - Established; Future  Mixed hyperlipidemia  -     phentermine (Adipex-P) 37.5 mg tablet; Please take 1 tablet by mouth with BREAKFAST, no later.  Lots of " fluids please throughout the day.  Thank you.  -     Follow Up In Primary Care - [facility]; Future  Cardiac risk counseling  -     phentermine (Adipex-P) 37.5 mg tablet; Please take 1 tablet by mouth with BREAKFAST, no later.  Lots of fluids please throughout the day.  Thank you.  -     Follow Up In [facility] - [facility]; Future  Obstructive sleep apnea on CPAP  -     phentermine (Adipex-P) 37.5 mg tablet; Please take 1 tablet by mouth with BREAKFAST, no later.  Lots of fluids please throughout the day.  Thank you.  -     Follow Up In Primary Care - [facility]; Future  Moderate episode of recurrent major depressive disorder  -     Follow Up In Primary Care - Established; Future  Vitamin D deficiency  -     Follow Up In Primary Care - Established; Future  Vitamin B12 deficiency  -     Follow Up In Primary Care - Established; Future  Malignant neoplasm of upper-outer quadrant of right breast in female, estrogen receptor positive  -     Follow Up In Primary Care - Established; Future  Age-related osteoporosis without current pathological fracture  -     Follow Up In Primary Care - Established; Future  Urge incontinence  -     Follow Up In Primary Care - Established; Future  Sensorineural hearing loss (SNHL) of both ears  -     Follow Up In Primary Care - Established; Future  Screening for colon cancer  -     Follow Up In [facility] - [facility]; Future    Thank you very much for coming.  It is nice to see you again.    We did your Medicare wellness evaluation today, and you seem to be doing well.  You just need to continue to follow with your BEHAVIORIST, so that you can stay motivated to take care of yourself.    I do appreciate that you would like to do something about your WEIGHT.  Please eat more sensibly.  [employer] diet book, DASH diet, Mediterranean diet for ideas.  We will talk about seeing a NUTRITIONIST or DIETITIAN if you would like.    Also, please get yourself motivated to do more BRISK  WALKING, at least 10 minutes in the morning, 10 minutes after lunch, and again 10 minutes in the afternoon, for a minimum of 30 minutes a day, every day.  All of these will contribute to better metabolism.    This will also help you lose weight with the help of PHENTERMINE/ADIPEX.  Please take with BREAKFAST, no later.  Watch out for dry mouth.  Drink lots of fluids throughout the day.  Watch out for constipation.  Consider METAMUCIL.  Stop taking the medication and call me if you have any challenges.    Phentermine/Adipex can also cause insomnia.  Take with breakfast, no later please.    You have taken phentermine/Adipex in the past successfully and without any significant side effects.  Lets see how you do this time around.    Please do not forget to get your COLONOSCOPY done.    We reviewed your LIVING WILL wishes and your CODE STATUS.  We will keep your SON, Benjamin, as your DURABLE POWER OF  for healthcare matters.  This means that if anything were to happen to you, and if you are unable to express your wishes, he will be the 1 whom we will depend on to speak on your behalf.  He will be able to make medical decisions for you.    Your CODE STATUS is FULL CODE, meaning that we will do everything necessary to save your life and to preserve it.  Afterwards, when you are stable, we will discuss with you and your family how you are doing and what your options are.    At the very least, we will always try our best to maintain your quality of life and keep you comfortable.    Please come back next month, so that we can see how you are doing with PHENTERMINE/ADIPEX.    Thank you for updating me on your VACCINATION profile.  I will update your chart.    Again, thank you very much for coming.  Take care and God bless.  See you in 1 month.  Call or text sooner please as needed.            0  Return in 1 months.  20 minutes please.  Phentermine/Adipex No. 2.  Reassess mood, energy, function, motivation, efforts  regarding diet and exercise.  Coordinate with behaviorist.  Consider referral to nutritionist.  Watch out for any increase in urinary symptoms, suggestive urine findings.            0

## 2025-07-14 NOTE — PROGRESS NOTES
Patient ID: Cindy Calvillo is a 75 y.o. female.    The patient presents to clinic today for her history of breast cancer.     Cancer Staging   Malignant neoplasm of upper-outer quadrant of right female breast  Staging form: Breast, AJCC 8th Edition  - Pathologic stage from 2/3/2025: Stage Unknown (pT1b, pNX, G2, ER+, CT+, HER2-) - Signed by Sherry Olmedo MD on 2/25/2025    Diagnostic/Therapeutic History:    - 10/31/2024: Right breast focal asymmetry. Diagnostic mammogram recommended.  Ultrasound may be indicated- BIRADS category 0    - 12/02/2024: follow up diagnostic imaging: Right breast indeterminate 9 mm heterogeneously hyperechoic lesion located at the 9:30 position 5 cm from nipple edge. US axilla showed 2 morphololgically normal  lymph nodes     - 12/06/2024: Right breast  mass ultrasound guided core needle biopsy showed IDC G2 ER: 95%, CT: 95%, HER2 negative 1+     -02/03/2025: Dr Garcia performed a right breast PM with path showing grade 2 , 9 mm focus of IDC. All margins are negative. Nodes not assessed. Final stage pT1bNx: ER: 95%, CT: 95%, HER2 negative (1+)    -04/08/2025: completed adjuvant radiation therapy    History of Present Illness (HPI)/Interval History:  Doing well. Complaint with daily anastrozole.     She denies any fevers or chills.    She denies any chest pain or breathing issues.     She denies any vision changes or headache issues, dizziness, loss of balance, neuropathy and no falls.     She denies any new or unexplained bone aches or pains. Baseline arthritis.     She denies any skin lesions or masses, hair loss, nail changes, or oral sores.    She reports a normal appetite and normal bowel movements. Her weight is stable.     Her fatigue is so-so some days better than others. Able to complete ADLs most days. Horrible hot flashes, nightly.     PMH: anxiety, HTN, hx of Gyn cancer, uterine? S/p hysterectomy    Allergies: Reviewed     Family hx: mother cervical cancer?, father with head  "and neck cancer?    Reproductive hx:  menarche 15, menopause 53, OCP x couple years, HRT: no,         Review of Systems:  14-point ROS otherwise negative, as per HPI.    Past Medical History:   Diagnosis Date    Anxiety     Arthritis     Depression     Hypertension     [place] on CPAP     states she uses sometimes    PONV (postoperative nausea and vomiting)     Seasonal allergies     Skin disorder     eczema in the past    Sleep apnea     CPAP used \"sometimes\"    Uterine cancer (Multi)     Wears glasses        Past Surgical History:   Procedure Laterality Date    BREAST BIOPSY      BREAST LUMPECTOMY Right     COLONOSCOPY      COLONOSCOPY W/ POLYPECTOMY N/A     HYSTERECTOMY      KNEE SURGERY N/A     MENISCECTOMY Left     ROTATOR CUFF REPAIR Left     x 2    SINUS SURGERY N/A     TONSILLECTOMY N/A     WISDOM TOOTH EXTRACTION N/A        Social History     Socioeconomic History    Marital status:    Tobacco Use    Smoking status: Never     Passive exposure: Past    Smokeless tobacco: Never   Vaping Use    Vaping status: Never Used   Substance and Sexual Activity    Alcohol use: Never    Drug use: Never    Sexual activity: Defer     Comment: hysterectomy     Social Drivers of Health     Financial Resource Strain: Low Risk  (2024)    Overall Financial Resource Strain (CARDIA)     Difficulty of Paying Living Expenses: Not hard at all   Food Insecurity: No Food Insecurity (2024)    Hunger Vital Sign     Worried About Running Out of Food in the Last Year: Never true     Ran Out of Food in the Last Year: Never true   Transportation Needs: No Transportation Needs (2024)    PRAPARE - Transportation     Lack of Transportation (Medical): No     Lack of Transportation (Non-Medical): No   Physical Activity: Insufficiently Active (2024)    Exercise Vital Sign     Days of Exercise per Week: 7 days     Minutes of Exercise per Session: 20 min   Stress: No Stress Concern Present (2024)    [facility] " Occupational Health - Occupational Stress Questionnaire     Feeling of Stress : Only a little   Social Connections: Moderately Isolated (1/4/2024)    Social Connection and Isolation Panel [NHANES]     Frequency of Communication with Friends and Family: More than three times a week     Frequency of Social Gatherings with Friends and Family: More than three times a week     Attends Yazdanism Services: Never     Active Member of Clubs or Organizations: Yes     Attends Club or Organization Meetings: 1 to 4 times per year     Marital Status:    Intimate Partner Violence: Not At Risk (1/4/2024)    Humiliation, Afraid, Rape, and Kick questionnaire     Fear of Current or Ex-Partner: No     Emotionally Abused: No     Physically Abused: No     Sexually Abused: No   Housing Stability: Low Risk  (1/4/2024)    Housing Stability Vital Sign     Unable to Pay for Housing in the Last Year: No     Number of Places Lived in the Last Year: 1     Unstable Housing in the Last Year: No       Allergies   Allergen Reactions    Atorvastatin Other     myalgia    Codeine Unknown    Doxycycline Unknown    Montelukast Unknown    Oxycodone Itching    Penicillins Unknown         Current Outpatient Medications:     albuterol 90 mcg/actuation inhaler, Inhale 2 puffs every 4 hours if needed for shortness of breath or wheezing., Disp: 18 g, Rfl: 2    cetirizine (ZyrTEC) 10 mg tablet, Take 1 tablet (10 mg) by mouth once daily as needed., Disp: , Rfl:     cholecalciferol (Vitamin D-3) 50,000 unit capsule, PLEASE TAKE 1 CAPSULE BY MOUTH SUNDAYS WITH LUNCH AND A FULL GLASS OF WATER. THANK YOU., Disp: 13 capsule, Rfl: 0    cyanocobalamin (Vitamin B-12) 1,000 mcg tablet, Take 1 tablet (1,000 mcg) by mouth once daily., Disp: , Rfl:     dilTIAZem ER (Tiazac) 120 mg 24 hr capsule, Take 1 capsule (120 mg) by mouth once daily., Disp: 90 capsule, Rfl: 1    fluticasone (Flonase) 50 mcg/actuation nasal spray, Use 2 sprays into each nostril after breakfast,  and use 2 sprays into each nostril after supper.  Thank you., Disp: 16 mL, Rfl: 5    lisinopril 5 mg tablet, Please take 1 tablet by mouth with SUPPER every evening.  Thank you., Disp: 90 tablet, Rfl: 1    phentermine (Adipex-P) 37.5 mg tablet, Please take 1 tablet by mouth with BREAKFAST, no later.  Lots of fluids please throughout the day.  Thank you., Disp: 30 tablet, Rfl: 0    venlafaxine XR (Effexor XR) 37.5 mg 24 hr capsule, Take 1 capsule (37.5 mg) by mouth once daily. Do not crush or chew., Disp: 90 capsule, Rfl: 1    venlafaxine XR (Effexor-XR) 150 mg 24 hr capsule, Take 1 capsule (150 mg) by mouth once daily. Do not crush or chew., Disp: 90 capsule, Rfl: 1    anastrozole (Arimidex) 1 mg tablet, Take 1 tablet (1 mg total) by mouth once daily.  Swallow whole with a drink of water., Disp: 90 tablet, Rfl: 3    metoprolol succinate XL (Toprol-XL) 25 mg 24 hr tablet, Please take ONLY HALF TABLET by mouth with SUPPER every evening.  Do not crush or chew.  Thank you. (Patient not taking: Reported on 7/15/2025), Disp: 45 tablet, Rfl: 1    multivitamin tablet, Take 1 tablet by mouth once daily. (Patient not taking: Reported on 7/15/2025), Disp: , Rfl:     nystatin (Mycostatin) cream, Apply topically 2 times a day. (Patient not taking: Reported on 7/15/2025), Disp: 30 g, Rfl: 1     Objective    BSA: 2.08 meters squared  /82 (BP Location: Right arm, Patient Position: Sitting, BP Cuff Size: Large adult long)   Pulse 82   Temp 36.4 °C (97.5 °F) (Temporal)   Resp 18   Wt 98.9 kg (218 lb 0.6 oz)   SpO2 96%   BMI 39.88 kg/m²     Performance Status:  The ECOG performance scale today is ECO- Fully active, able to carry on all pre-disease performance w/o restriction.      Physical Exam    GA: alert, oriented, cooperative  HEENT: anicteric sclera, well injected conjunctiva  Heart: RRR, no murmurs   Lung: CTAB  Abd: +BS, soft, non tender   Ext Peripheral pulses positive, warm extremities  Right breast well  healing incision, no new nodules or LN    Laboratory Data:  Lab Results   Component Value Date    WBC 6.6 05/05/2025    HGB 14.3 05/05/2025    HCT 43.5 05/05/2025    MCV 94.0 05/05/2025     05/05/2025       Chemistry    Lab Results   Component Value Date/Time     05/05/2025 1004    K 4.2 05/05/2025 1004     05/05/2025 1004    CO2 25 05/05/2025 1004    BUN 24 05/05/2025 1004    CREATININE 0.70 05/05/2025 1004    Lab Results   Component Value Date/Time    CALCIUM 9.6 05/05/2025 1004    ALKPHOS 54 05/05/2025 1004    AST 18 05/05/2025 1004    ALT 26 05/05/2025 1004    BILITOT 1.0 05/05/2025 1004             Radiology:  Rad Onc CT Sim Images  These images are not reportable by radiology and will not be interpreted   by  Radiologists.       BI mammo right diagnostic tomosynthesis 12/02/2024  BI US breast limited right 12/02/2024    Narrative  Interpreted By:  Genaro Mcknight,  STUDY:  BI MAMMO RIGHT DIAGNOSTIC TOMOSYNTHESIS; BI US BREAST LIMITED RIGHT;  12/2/2024 9:28 am; 12/2/2024 10:33 am    ACCESSION NUMBER(S):  [id number]; [id number]    ORDERING CLINICIAN:  JUDITH MEANS    INDICATION:  Right breast BI-RADS 0 screening mammogram for an area of asymmetry  in the lower outer aspect.    ,R92.8 Other abnormal and inconclusive findings on diagnostic imaging  of breast    COMPARISON:  Mammograms of 10/31/2024, 08/14/2023 06/14/2021, 04/04/2016.    FINDINGS:  MAMMOGRAPHY: Right breast spot compression tomosynthesis images were  obtained in the CC and MLO projections.    Density:  There are scattered areas of fibroglandular density.    A small area of asymmetry in the right breast lower outer quadrant at  middle depth persists on spot compression views. No tumoral  calcification is seen.    ULTRASOUND: Targeted ultrasound was performed of the right breast  lower outer quadrant by a registered sonographer. Sonographic survey  of the right axillary region also performed.    Within the right breast at the  9:30 position 5 cm from nipple edge  there is an irregular heterogeneously hyperechoic lesion with  ill-defined margins measuring approximately 8 x 6 x 9 mm with some  associated acoustic shadowing. No intrinsic color Doppler blood flow  is demonstrated.    Survey of the right axillary region identifies two lymph nodes which  maintain a more benign sonographic morphology with hyperechoic jose carlos  and non thickened peripheral hypoechoic cortices.    Impression  Right breast indeterminate 9 mm heterogeneously hyperechoic lesion  located at the 9:30 position 5 cm from nipple edge. Neoplasm can not  be excluded and biopsy is recommended.    BI-RADS CATEGORY:  BI-RADS Category:  4 Suspicious.  Recommendation:  Surgical Consultation and Biopsy.  Recommended Date:  Immediate.  Laterality:  Right.    For any future breast imaging appointments, please call [phone] ([phone]).      MACRO:  None    Signed by: Genaro Mcknight 12/2/2024 10:45 AM  Dictation workstation:   [id number]     Assessment/Plan:  75 y.o.  female patient with newly diagnosed pT1bNx ER/CA positive, HER2 negative BC. On 02/03/2025, she underwent a  right breast PM with path showing grade 2 , 9 mm focus of IDC. All margins are negative. Nodes not assessed. Final stage pT1bNx: ER: 95%, CA: 95%, HER2 negative (1+). Completed adjuvant radiation therapy on 04/08/2025 Presenting to discuss treatment options.     I reviewed with her the events that led to her diagnosis of breast cancer. We reviewed all the procedures and diagnostic imaging she underwent thus far. I discussed the features of her breast cancer that include the size, grade, lymph node status and  hormone receptor/ her2-rodney status.     Grade 1-2 hot flashes: discussed treatment options / AI change. Declined for now   - Continue anastrozole, vit D  - DEXA scan in 08/2023: osteoporosis- given info about Zometa. Updated dexa due this year. Pending results would likely reconsider bone strengthening  medication (zometa, prolia, fosamax)   - Radiation oncology follow up 10/2025   - Mammogram to be ordered through Dr. Amaro  - [facility] in 6 months with Mckenzie Head PA-C     There is no evidence of breast cancer recurrence based on her clinical exam today.       At least 20 minutes of direct consultation was spent reviewing the patient's chart as well as discussing and reviewing the  cancer care plan including educating and answering questions and concerns, greater than 50 percent spent in counseling and coordination of care.       Mckenzie Head PA-C  Hematology and Medical Oncology  [facility]

## 2025-07-15 ENCOUNTER — OFFICE VISIT (OUTPATIENT)
Dept: HEMATOLOGY/ONCOLOGY | Facility: CLINIC | Age: 75
End: 2025-07-15
Payer: MEDICARE

## 2025-07-15 VITALS
HEART RATE: 82 BPM | OXYGEN SATURATION: 96 % | WEIGHT: 218.03 LBS | SYSTOLIC BLOOD PRESSURE: 147 MMHG | DIASTOLIC BLOOD PRESSURE: 82 MMHG | BODY MASS INDEX: 39.88 KG/M2 | TEMPERATURE: 97.5 F | RESPIRATION RATE: 18 BRPM

## 2025-07-15 DIAGNOSIS — C50.411 MALIGNANT NEOPLASM OF UPPER-OUTER QUADRANT OF RIGHT FEMALE BREAST, UNSPECIFIED ESTROGEN RECEPTOR STATUS: ICD-10-CM

## 2025-07-15 PROCEDURE — 1126F AMNT PAIN NOTED NONE PRSNT: CPT

## 2025-07-15 PROCEDURE — 1159F MED LIST DOCD IN RCRD: CPT

## 2025-07-15 PROCEDURE — 3077F SYST BP >= 140 MM HG: CPT

## 2025-07-15 PROCEDURE — 99214 OFFICE O/P EST MOD 30 MIN: CPT

## 2025-07-15 PROCEDURE — 3079F DIAST BP 80-89 MM HG: CPT

## 2025-07-15 RX ORDER — ANASTROZOLE 1 MG/1
1 TABLET ORAL DAILY
Qty: 90 TABLET | Refills: 3 | Status: SHIPPED | OUTPATIENT
Start: 2025-07-15

## 2025-07-15 ASSESSMENT — PAIN SCALES - GENERAL: PAINLEVEL_OUTOF10: 0-NO PAIN

## 2025-07-16 ENCOUNTER — APPOINTMENT (OUTPATIENT)
Dept: PRIMARY CARE | Facility: CLINIC | Age: 75
End: 2025-07-16
Payer: MEDICARE

## 2025-07-16 VITALS
OXYGEN SATURATION: 98 % | BODY MASS INDEX: 40.3 KG/M2 | HEART RATE: 83 BPM | DIASTOLIC BLOOD PRESSURE: 75 MMHG | SYSTOLIC BLOOD PRESSURE: 109 MMHG | HEIGHT: 62 IN | WEIGHT: 219 LBS

## 2025-07-16 DIAGNOSIS — R73.03 PREDIABETES: ICD-10-CM

## 2025-07-16 DIAGNOSIS — Z71.89 CARDIAC RISK COUNSELING: ICD-10-CM

## 2025-07-16 DIAGNOSIS — E55.9 VITAMIN D DEFICIENCY: ICD-10-CM

## 2025-07-16 DIAGNOSIS — E78.2 MIXED HYPERLIPIDEMIA: ICD-10-CM

## 2025-07-16 DIAGNOSIS — G47.33 OBSTRUCTIVE SLEEP APNEA ON CPAP: ICD-10-CM

## 2025-07-16 DIAGNOSIS — N30.00 ACUTE CYSTITIS WITHOUT HEMATURIA: ICD-10-CM

## 2025-07-16 DIAGNOSIS — M81.0 AGE-RELATED OSTEOPOROSIS WITHOUT CURRENT PATHOLOGICAL FRACTURE: ICD-10-CM

## 2025-07-16 DIAGNOSIS — F33.1 MODERATE EPISODE OF RECURRENT MAJOR DEPRESSIVE DISORDER: ICD-10-CM

## 2025-07-16 DIAGNOSIS — Z17.0 MALIGNANT NEOPLASM OF UPPER-OUTER QUADRANT OF RIGHT BREAST IN FEMALE, ESTROGEN RECEPTOR POSITIVE: ICD-10-CM

## 2025-07-16 DIAGNOSIS — I10 ESSENTIAL HYPERTENSION: ICD-10-CM

## 2025-07-16 DIAGNOSIS — N95.1 HOT FLASHES DUE TO MENOPAUSE: ICD-10-CM

## 2025-07-16 DIAGNOSIS — T46.6X5A STATIN MYOPATHY: ICD-10-CM

## 2025-07-16 DIAGNOSIS — C50.411 MALIGNANT NEOPLASM OF UPPER-OUTER QUADRANT OF RIGHT BREAST IN FEMALE, ESTROGEN RECEPTOR POSITIVE: ICD-10-CM

## 2025-07-16 DIAGNOSIS — N39.41 URGE INCONTINENCE: ICD-10-CM

## 2025-07-16 DIAGNOSIS — G72.0 STATIN MYOPATHY: ICD-10-CM

## 2025-07-16 DIAGNOSIS — E53.8 VITAMIN B12 DEFICIENCY: ICD-10-CM

## 2025-07-16 DIAGNOSIS — E66.01 MORBID OBESITY WITH BMI OF 40.0-44.9, ADULT (MULTI): Primary | ICD-10-CM

## 2025-07-16 DIAGNOSIS — Z12.11 SCREENING FOR COLON CANCER: ICD-10-CM

## 2025-07-16 DIAGNOSIS — H90.3 SENSORINEURAL HEARING LOSS (SNHL) OF BOTH EARS: ICD-10-CM

## 2025-07-16 PROCEDURE — 3078F DIAST BP <80 MM HG: CPT | Performed by: INTERNAL MEDICINE

## 2025-07-16 PROCEDURE — G2211 COMPLEX E/M VISIT ADD ON: HCPCS | Performed by: INTERNAL MEDICINE

## 2025-07-16 PROCEDURE — 99213 OFFICE O/P EST LOW 20 MIN: CPT | Performed by: INTERNAL MEDICINE

## 2025-07-16 PROCEDURE — 3074F SYST BP LT 130 MM HG: CPT | Performed by: INTERNAL MEDICINE

## 2025-07-16 PROCEDURE — 1159F MED LIST DOCD IN RCRD: CPT | Performed by: INTERNAL MEDICINE

## 2025-07-16 PROCEDURE — 3008F BODY MASS INDEX DOCD: CPT | Performed by: INTERNAL MEDICINE

## 2025-07-16 PROCEDURE — 1036F TOBACCO NON-USER: CPT | Performed by: INTERNAL MEDICINE

## 2025-07-16 RX ORDER — BISMUTH SUBSALICYLATE 262 MG
1 TABLET,CHEWABLE ORAL DAILY
COMMUNITY
Start: 2025-07-16

## 2025-07-16 RX ORDER — PHENTERMINE HYDROCHLORIDE 37.5 MG/1
TABLET ORAL
Qty: 30 TABLET | Refills: 0 | Status: SHIPPED | OUTPATIENT
Start: 2025-07-16

## 2025-07-16 RX ORDER — FEZOLINETANT 45 MG/1
1 TABLET, FILM COATED ORAL
Qty: 21 TABLET | Refills: 0 | COMMUNITY
Start: 2025-07-16

## 2025-07-16 ASSESSMENT — PATIENT HEALTH QUESTIONNAIRE - PHQ9
2. FEELING DOWN, DEPRESSED OR HOPELESS: NOT AT ALL
1. LITTLE INTEREST OR PLEASURE IN DOING THINGS: NOT AT ALL
SUM OF ALL RESPONSES TO PHQ9 QUESTIONS 1 AND 2: 0

## 2025-07-16 ASSESSMENT — COLUMBIA-SUICIDE SEVERITY RATING SCALE - C-SSRS
1. IN THE PAST MONTH, HAVE YOU WISHED YOU WERE DEAD OR WISHED YOU COULD GO TO SLEEP AND NOT WAKE UP?: NO
6. HAVE YOU EVER DONE ANYTHING, STARTED TO DO ANYTHING, OR PREPARED TO DO ANYTHING TO END YOUR LIFE?: NO
2. HAVE YOU ACTUALLY HAD ANY THOUGHTS OF KILLING YOURSELF?: NO

## 2025-07-16 ASSESSMENT — ENCOUNTER SYMPTOMS
DEPRESSION: 0
LOSS OF SENSATION IN FEET: 0
OCCASIONAL FEELINGS OF UNSTEADINESS: 0

## 2025-07-16 NOTE — PROGRESS NOTES
"Subjective   Patient ID: Cindy Calvillo is a 75 y.o. female who presents for Follow-up (Weight Management follow up Adipex.).    HPI   The patient is here for reevaluation of efforts regarding weight management, including possible phentermine/Adipex No. 2.  Compliant with medications, tolerating regimens.  Has been more physically active.  Has also been trying to eat more sensibly.  Has been successful in modest weight loss, and has been feeling very good about her efforts!  Continues to follow closely with her behaviorist as well as her psychiatrist.  Compliant with medications, tolerating regimens.  No headache, blurred vision, diplopia.  No dysphagia.  No focal weakness, ataxia, clumsiness.  No falls.  No paresthesia.  No confusion or delirium, with patient not worried about memory.  Not depressive or suicidal, with patient not wishing harm to self or others.  ENDOCRINE with no polyuria, polydipsia, polyphagia.  No blurred vision.  No skin, hair, nail changes.  No dramatic weight loss or weight gain.      Appetite controlled, with no abdominal distress.  The patient does point out that she continues to have NOCTURIA, as well as frequency, sometimes urgency, but no dysuria, and no malodor.  No particular skin changes.  No particular cough or sputum production.  No lee substernal chest pain, no orthopnea, no paroxysmal nocturnal dyspnea.  CONSTITUTIONALLY, no fever, no chills.  No night sweats.  No lingering anorexia or nausea.  No apparent lymphadenopathy.  No apparent weight loss.        Review of Systems  Review of systems as in history of present illness, and otherwise, reviewed separately as well, and was unremarkable/negative/noncontributory.        Objective   /75 (BP Location: Left arm, Patient Position: Sitting, BP Cuff Size: Adult)   Pulse 83   Ht 1.575 m (5' 2\")   Wt 99.3 kg (219 lb)   SpO2 98%   BMI 40.06 kg/m²     Physical Exam  In good spirits.  Not in distress or diaphoresis.  Alert, " oriented x 3.  Amiable.  Not unkempt.  Obese build, but remaining capable, independent, appropriate.  Eager to improve quality of life, and does not wish harm to self or others.  In very hopeful spirits!    HEAD pink palpebral conjunctivae, anicteric sclerae.  NECK supple, no apparent jugular venous distention.  CARDIOVASCULAR not in distress or diaphoresis.  No bipedal edema.  LUNGS not in distress or diaphoresis.  Not using accessory muscles.  ABDOMEN soft, nontender.  BACK no costovertebral angle tenderness.  EXTREMITIES no clubbing, no cyanosis.  NEURO no facial asymmetry.  No apparent cranial nerve deficits.  Romberg negative.  Ambulating without need of assistance.  No apparent focal weakness.  No tremors.  PSYCH receptive, appropriate, and eager to maintain and improve quality of life.        LABORATORY results reviewed from May 2025, to be repeated in 3 months:  Lipid panel 257/40/154/triglycerides 187.  Liver function preserved.  The ASCVD Risk score (Will BRIGHT, et al., 2019) failed to calculate for the following reasons:    Risk score cannot be calculated because patient has a medical history suggesting prior/existing ASCVD  Hemoglobin A1c 6.4, with current body mass index 40.06.  Modest weight loss 1 pound over the last month documented.  Vitamin D 33, tolerating vitamin D 50,000 units regimen.  Vitamin B12 normal, tolerating vitamin B12 once a day.  Urine findings consistent with UTI, with patient currently stating no symptoms except for nocturia, frequency.        Assessment/Plan   Diagnoses and all orders for this visit:  Morbid obesity with BMI of 40.0-44.9, adult (Multi)  -     Follow Up In Primary Care - Established  -     phentermine (Adipex-P) 37.5 mg tablet; Please take 1 tablet by mouth with BREAKFAST, no later.  Lots of fluids please throughout the day.  Thank you.  -     Follow Up In Primary Care - Established; Future  Prediabetes  -     phentermine (Adipex-P) 37.5 mg tablet; Please take 1  tablet by mouth with BREAKFAST, no later.  Lots of fluids please throughout the day.  Thank you.  -     Follow Up In Primary Care - Established; Future  -     Comprehensive Metabolic Panel; Future  -     Hemoglobin A1C; Future  -     Urinalysis with Reflex Culture and Microscopic; Future  Essential hypertension  -     Follow Up In Primary Care - Established; Future  -     Comprehensive Metabolic Panel; Future  -     Urinalysis with Reflex Culture and Microscopic; Future  Mixed hyperlipidemia  -     phentermine (Adipex-P) 37.5 mg tablet; Please take 1 tablet by mouth with BREAKFAST, no later.  Lots of fluids please throughout the day.  Thank you.  -     Follow Up In Primary Care - [facility]; Future  -     Comprehensive Metabolic Panel; Future  -     Lipid Panel; Future  Cardiac risk counseling  -     phentermine (Adipex-P) 37.5 mg tablet; Please take 1 tablet by mouth with BREAKFAST, no later.  Lots of fluids please throughout the day.  Thank you.  -     Follow Up In Primary Care - [facility]; Future  -     Comprehensive Metabolic Panel; Future  -     Hemoglobin A1C; Future  -     Lipid Panel; Future  Statin myopathy  Obstructive sleep apnea on CPAP  -     phentermine (Adipex-P) 37.5 mg tablet; Please take 1 tablet by mouth with BREAKFAST, no later.  Lots of fluids please throughout the day.  Thank you.  -     Follow Up In Primary Care - [facility]; Future  Moderate episode of recurrent major depressive disorder  -     Follow Up In Primary Care [facility]; Future  Vitamin D deficiency  -     Follow Up In Primary Care [facility]; Future  Vitamin B12 deficiency  -     Follow Up In Primary Care [facility]; Future  Malignant neoplasm of upper-outer quadrant of right breast in female, estrogen receptor positive  -     Follow Up In Primary Care [facility]; Future  Age-related osteoporosis without current pathological fracture  -     Follow Up In Primary Care - [facility]; Future  Hot flashes due to  menopause  -     Follow Up In Primary Care - Established; Future  -     fezolinetant (Veozah) 45 mg tablet; Take 1 tablet by mouth once daily in the evening. Take with meals.  Urge incontinence  -     Follow Up In Primary Care - Established; Future  -     Urinalysis with Reflex Culture and Microscopic; Future  Acute cystitis without hematuria  -     Urinalysis with Reflex Culture and Microscopic; Future  Sensorineural hearing loss (SNHL) of both ears  -     Follow Up In Primary Care - Established; Future  Screening for colon cancer  -     Follow Up In Primary Care - Established; Future       Thank you for much for coming.  I am very happy to see you again.    You have been successful in losing 1 to 2 pounds.  That does not sound like much, but please consider that you did not gain any weight, and in the meantime, you have had more energy, and you are feeling better about your overall health!  This will translate to more weight loss as long as you stick to our plan!    Please continue eating sensibly.  [employer] diet book, DASH diet, Mediterranean diet for ideas.    Please continue staying physically active.  Even if it is errands, it is still exercise if you are doing lots of brisk walking!    Please continue to take your PHENTERMINE/ADIPEX with BREAKFAST, no later, so that it does not interfere with your sleep.    Please continue to drink lots of fluids throughout the day, and please consider METAMUCIL as needed for constipation.    Please remember that you also have to have enough rest and sleep for your metabolism to move forward, so that you will lose weight!  Do not stay up late unless you have to.    I do understand that you have been having trouble with HOT FLASHES.  Good then try VEOZAH 45 mg tablets.  Take 1 tablet by mouth with SUPPER every evening.  It will take about 3 weeks for you to experience any significant change in your hot flashes.  If you feel that it is working, please let me know, and I will  call in a prescription.    Please continue taking your VITAMIN D once a week.  Please continue taking your VITAMIN B12 with LUNCH every day,   And please continue taking a MULTIVITAMIN like Centrum Silver with BREAKFAST every day.  Please take note that the multivitamin is with breakfast, and the B12 is with lunch, not together.    Please continue seeing your THERAPIST.  I can tell the difference!    Please do not forget to call Dr. Galloway and get your COLONOSCOPY done.    Please get yourself vaccinated for SHINGLES.  You can have this done at your local friendly pharmacy.  Please get this done.  It is highly recommended, paid for by your insurance, and available from your local friendly pharmacy.  Yes, it may hurt a little, but pain dissipates, and you will be protected for life against shingles!  SHINGRIX comes in a series of 2 injections at least 2 months apart.  Please get this done.    To minimize having to go to the bathroom in the middle of the night, please cut back on fluids after supper.  Only sips of water afterwards.  Urinate completely before you go to bed.  No watermelon after 6 PM!    See you in 1 month.  Do not forget to do FASTING laboratory examinations a few days prior.  Dial [phone] and schedule your appointment, so that you do not have to wait.    I am very happy to see you, and I am excited to see you next month!  I expect good news!  See you then.  Take care and God bless.            0  Return in 1 month.  20 minutes please.  FASTING laboratory examinations a few days prior.  Phentermine/Adipex No. 3.  Consider nutritionist.  Reassess physical activity, consider gym membership/Silver sneakers.  Reassess mood, energy, function, coordinate with behaviorist.  Review lipid panel.            0

## 2025-07-16 NOTE — PATIENT INSTRUCTIONS
Thank you for much for coming.  I am very happy to see you again.    You have been successful in losing 1 to 2 pounds.  That does not sound like much, but please consider that you did not gain any weight, and in the meantime, you have had more energy, and you are feeling better about your overall health!  This will translate to more weight loss as long as you stick to our plan!    Please continue eating sensibly.  [employer] diet book, DASH diet, Mediterranean diet for ideas.    Please continue staying physically active.  Even if it is errands, it is still exercise if you are doing lots of brisk walking!    Please continue to take your PHENTERMINE/ADIPEX with BREAKFAST, no later, so that it does not interfere with your sleep.    Please continue to drink lots of fluids throughout the day, and please consider METAMUCIL as needed for constipation.    Please remember that you also have to have enough rest and sleep for your metabolism to move forward, so that you will lose weight!  Do not stay up late unless you have to.    I do understand that you have been having trouble with HOT FLASHES.  Good then try VEOZAH 45 mg tablets.  Take 1 tablet by mouth with SUPPER every evening.  It will take about 3 weeks for you to experience any significant change in your hot flashes.  If you feel that it is working, please let me know, and I will call in a prescription.    Please continue taking your VITAMIN D once a week.  Please continue taking your VITAMIN B12 with LUNCH every day,   And please continue taking a MULTIVITAMIN like Centrum Silver with BREAKFAST every day.  Please take note that the multivitamin is with breakfast, and the B12 is with lunch, not together.    Please continue seeing your THERAPIST.  I can tell the difference!    Please do not forget to call Dr. Galloway and get your COLONOSCOPY done.    Please get yourself vaccinated for SHINGLES.  You can have this done at your local friendly pharmacy.  Please get this  done.  It is highly recommended, paid for by your insurance, and available from your local [facility] pharmacy.  Yes, it may hurt a little, but pain dissipates, and you will be protected for life against shingles!  SHINGRIX comes in a series of 2 injections at least 2 months apart.  Please get this done.    To minimize having to go to the bathroom in the middle of the night, please cut back on fluids after supper.  Only sips of water afterwards.  Urinate completely before you go to bed.  No watermelon after 6 PM!    See you in 1 month.  Do not forget to do FASTING laboratory examinations a few days prior.  Dial [phone] and schedule your appointment, so that you do not have to wait.    I am very happy to see you, and I am excited to see you next month!  I expect good news!  See you then.  Take care and God bless.            0  Return in 1 month.  20 minutes please.  FASTING laboratory examinations a few days prior.  Phentermine/Adipex No. 3.  Consider nutritionist.  Reassess physical activity, consider gym membership/Silver sneakers.  Reassess mood, energy, function, coordinate with behaviorist.  Review lipid panel.            0

## 2025-08-18 ENCOUNTER — APPOINTMENT (OUTPATIENT)
Dept: PRIMARY CARE | Facility: CLINIC | Age: 75
End: 2025-08-18
Payer: MEDICARE

## 2025-08-20 ENCOUNTER — HOSPITAL ENCOUNTER (OUTPATIENT)
Dept: RADIOLOGY | Facility: HOSPITAL | Age: 75
Discharge: HOME | End: 2025-08-20
Payer: MEDICARE

## 2025-08-20 DIAGNOSIS — C50.411 MALIGNANT NEOPLASM OF UPPER-OUTER QUADRANT OF RIGHT FEMALE BREAST, UNSPECIFIED ESTROGEN RECEPTOR STATUS: ICD-10-CM

## 2025-08-20 DIAGNOSIS — Z12.31 ENCOUNTER FOR SCREENING MAMMOGRAM FOR MALIGNANT NEOPLASM OF BREAST: ICD-10-CM

## 2025-08-20 PROCEDURE — 77066 DX MAMMO INCL CAD BI: CPT | Performed by: RADIOLOGY

## 2025-08-20 PROCEDURE — 77066 DX MAMMO INCL CAD BI: CPT

## 2025-08-20 PROCEDURE — 77062 BREAST TOMOSYNTHESIS BI: CPT | Performed by: RADIOLOGY

## 2025-08-27 ENCOUNTER — APPOINTMENT (OUTPATIENT)
Dept: SURGERY | Facility: CLINIC | Age: 75
End: 2025-08-27
Payer: MEDICARE

## 2025-08-27 VITALS
WEIGHT: 222 LBS | BODY MASS INDEX: 40.85 KG/M2 | SYSTOLIC BLOOD PRESSURE: 126 MMHG | HEART RATE: 74 BPM | HEIGHT: 62 IN | DIASTOLIC BLOOD PRESSURE: 82 MMHG

## 2025-08-27 DIAGNOSIS — C50.411 MALIGNANT NEOPLASM OF UPPER-OUTER QUADRANT OF RIGHT FEMALE BREAST, UNSPECIFIED ESTROGEN RECEPTOR STATUS: Primary | ICD-10-CM

## 2025-08-27 DIAGNOSIS — Z12.31 ENCOUNTER FOR SCREENING MAMMOGRAM FOR MALIGNANT NEOPLASM OF BREAST: ICD-10-CM

## 2025-08-27 PROCEDURE — 3074F SYST BP LT 130 MM HG: CPT | Performed by: SURGERY

## 2025-08-27 PROCEDURE — 3079F DIAST BP 80-89 MM HG: CPT | Performed by: SURGERY

## 2025-08-27 PROCEDURE — 1159F MED LIST DOCD IN RCRD: CPT | Performed by: SURGERY

## 2025-08-27 PROCEDURE — 3008F BODY MASS INDEX DOCD: CPT | Performed by: SURGERY

## 2025-08-27 PROCEDURE — 99213 OFFICE O/P EST LOW 20 MIN: CPT | Performed by: SURGERY

## 2025-09-29 ENCOUNTER — APPOINTMENT (OUTPATIENT)
Dept: PRIMARY CARE | Facility: CLINIC | Age: 75
End: 2025-09-29
Payer: MEDICARE

## 2025-10-23 ENCOUNTER — APPOINTMENT (OUTPATIENT)
Dept: BEHAVIORAL HEALTH | Facility: CLINIC | Age: 75
End: 2025-10-23
Payer: MEDICARE

## (undated) DEVICE — STRAP, VELCRO, BODY, 4 X 60IN, NS

## (undated) DEVICE — TOWEL PACK, STERILE, 16X24, XRAY DETECTABLE, BLUE, 4/PK

## (undated) DEVICE — SUTURE, VICRYL PLUS 3-0, SH, 27IN

## (undated) DEVICE — SUTURE, MONOCRYL, 4-0, 27 IN, PS-2, UNDYED

## (undated) DEVICE — DRAPE SHEET, UTILITY, 26 X 15, W/ TAPE, STERILE

## (undated) DEVICE — DRAPE, SHEET, ENDOSCOPY, GENERAL, FENESTRATED, ARMBOARD COVER, 98 X 123.5 IN, DISPOSABLE, LF, STERILE

## (undated) DEVICE — Device

## (undated) DEVICE — TRAY, DRY PREP, PREMIUM

## (undated) DEVICE — DRAPE, SHEET, XL

## (undated) DEVICE — SOLUTION, SCRUB EXIDINE, 4% CHG, 8 OZ

## (undated) DEVICE — KIT, MARGINMARKER, 6 INK COLORS, STANDARD

## (undated) DEVICE — GLOVE, SURGICAL, PROTEXIS PI , 7.5, PF, LF

## (undated) DEVICE — CAUTERY, PENCIL, PUSH BUTTON, SMOKE EVAC, 70MM

## (undated) DEVICE — STRAP, ARM BOARD, 32 X 1.5

## (undated) DEVICE — RETRACTOR, HANDHELD, 250ML, DBL ENDED

## (undated) DEVICE — ELECTRODE, ELECTROSURGICAL, BLADE, INSULATED, ENT/IMA, STERILE